# Patient Record
Sex: FEMALE | Race: WHITE
[De-identification: names, ages, dates, MRNs, and addresses within clinical notes are randomized per-mention and may not be internally consistent; named-entity substitution may affect disease eponyms.]

---

## 2018-04-17 ENCOUNTER — HOSPITAL ENCOUNTER (INPATIENT)
Dept: HOSPITAL 68 - ERH | Age: 63
LOS: 45 days | DRG: 885 | End: 2018-06-01
Attending: PSYCHIATRY & NEUROLOGY | Admitting: PSYCHIATRY & NEUROLOGY
Payer: COMMERCIAL

## 2018-04-17 VITALS — HEIGHT: 64 IN | WEIGHT: 150 LBS | BODY MASS INDEX: 25.61 KG/M2

## 2018-04-17 DIAGNOSIS — F25.9: Primary | ICD-10-CM

## 2018-04-17 LAB
ABSOLUTE GRANULOCYTE CT: 6 /CUMM (ref 1.4–6.5)
BASOPHILS # BLD: 0 /CUMM (ref 0–0.2)
BASOPHILS NFR BLD: 0.4 % (ref 0–2)
EOSINOPHIL # BLD: 0 /CUMM (ref 0–0.7)
EOSINOPHIL NFR BLD: 0.2 % (ref 0–5)
ERYTHROCYTE [DISTWIDTH] IN BLOOD BY AUTOMATED COUNT: 15.1 % (ref 11.5–14.5)
GRANULOCYTES NFR BLD: 74.2 % (ref 42.2–75.2)
HCT VFR BLD CALC: 41.3 % (ref 37–47)
LYMPHOCYTES # BLD: 1.3 /CUMM (ref 1.2–3.4)
MCH RBC QN AUTO: 31.1 PG (ref 27–31)
MCHC RBC AUTO-ENTMCNC: 33.5 G/DL (ref 33–37)
MCV RBC AUTO: 92.8 FL (ref 81–99)
MONOCYTES # BLD: 0.7 /CUMM (ref 0.1–0.6)
PLATELET # BLD: 171 /CUMM (ref 130–400)
PMV BLD AUTO: 7.9 FL (ref 7.4–10.4)
RED BLOOD CELL CT: 4.45 /CUMM (ref 4.2–5.4)
WBC # BLD AUTO: 8.1 /CUMM (ref 4.8–10.8)

## 2018-04-17 PROCEDURE — G0480 DRUG TEST DEF 1-7 CLASSES: HCPCS

## 2018-04-17 PROCEDURE — G0463 HOSPITAL OUTPT CLINIC VISIT: HCPCS

## 2018-04-17 NOTE — ED PSYCHIATRIC COMPLAINT
**See Addendum**
History of Present Illness
 
General
Chief Complaint: Psychiatric Related Complaint
Stated Complaint: +SI STATEMENTS
Source: patient
Exam Limitations: PSYCHIATRIC DISORDER
 
Vital Signs & Intake/Output
Vital Signs & Intake/Output
 Vital Signs
 
 
Date Time Temp Pulse Resp B/P B/P Pulse O2 O2 Flow FiO2
 
     Mean Ox Delivery Rate 
 
04/17 1130 98.7 73 18 142/70  95 Room Air  
 
 
 
Allergies
Coded Allergies:
olanzapine (Severe, PT REPORTS IT MADE HER LAME LED TO HOSPITALAZATION 03/14/16)
 
Reconcile Medications
Aspirin (Ecotrin*) 81 MG TABLET.DR   1 TAB PO DAILY HEART/BLOOD  (Reported)
Atorvastatin Calcium (Lipitor) 40 MG TABLET   1 TAB PO QPM CHOLESTEROL  (
Reported)
Benztropine Mesylate 0.5 MG TABLET   1 TAB PO QAM AKATHISIA  (Reported)
Benztropine Mesylate 0.5 MG TABLET   2 TAB PO QPM AKATHISIA  (Reported)
Carbamazepine (Tegretol XR) 200 MG TAB.ER.12H   1 TAB PO BID MENTAL HEALTH  (
Reported)
Levothyroxine Sodium (Synthroid) 137 MCG TABLET   1 TAB PO DAILY THYROID  (
Reported)
Metformin HCl 1,000 MG TABLET   1 TAB PO BID DIABETES  (Reported)
Propranolol HCl 10 MG TABLET   1 TAB PO BID BP  (Reported)
Risperidone 2 MG TABLET   1 TAB PO BID MENTAL HEALTH  (Reported)
Trazodone HCl 50 MG TABLET   1 TAB PO QHS PRN MENTAL HEALTH/SLEEP  (Reported)
 
Triage Note:
PT BIBA FROM HOME FOR +SI STATEMENTS. PT ON PEER,
 COPY IN CHART OTHER IN BOX. PT STATED TO FAMILY
 THAT SHE WANTS TO DIE. PT IS CURRENTLY HOMELESS
 LIVING WITH HER  OUT OF HER CAR.  IS
 CURRENTLY A PT HERE IN TRIAGE
Triage Nurses Notes Reviewed? yes
HPI:
Patient presents for evaluation of suicide ideation.  Patient stated "I want to 
die" because "I am bad" and "I'm disgusting".  Sometimes began over the past few
days and are described as more or less constant and severe.  When asked if she 
had a plan to commit suicide she states that she would have her ex- beat 
her up.
(Kati VALVERDE,Ben MACHADO)
 
Past History
 
Travel History
Traveled to [place] past 21 day No
 
Medical History
Any Pertinent Medical History? see below for history
Neurological: NONE (3 older sibs have Parkinson's), peripheral neuropathy
EENT: NONE
Cardiovascular: hypertension, hyperlipidemia
Respiratory: NONE
Gastrointestinal: NONE
Hepatic: NONE
Renal: NONE
Musculoskeletal: NONE
Psychiatric: schizo affective disorder
Endocrine: Hypothyroidism 
Blood Disorders: NONE
Cancer(s): NONE
GYN/Reproductive: NONE
History of MRSA: No
History of VRE: No
History of CDIFF: No
 
Surgical History
Surgical History: non-contributory
 
Psychosocial History
Who do you live with Other (see notes)
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
What is your primary language English
Tobacco Use: Never used
ETOH Use: denies use
Illicit Drug Use: denies illicit drug use
 
Family History
Hx Contributory? No
(Ben Parikh MD)
 
Review of Systems
 
Review of Systems
Constitutional:
Reports: no symptoms. 
EENTM:
Reports: no symptoms. 
Respiratory:
Reports: no symptoms. 
Cardiovascular:
Reports: no symptoms. 
GI:
Reports: no symptoms. 
Genitourinary:
Reports: no symptoms. 
Musculoskeletal:
Reports: no symptoms. 
Skin:
Reports: no symptoms. 
Neurological/Psychological:
Reports: see HPI. 
Hematologic/Endocrine:
Reports: no symptoms. 
Immunologic/Allergic:
Reports: no symptoms. 
All Other Systems: Reviewed and Negative
(Kati VALVERDE,Ben MACHADO)
 
Physical Exam
 
Physical Exam
General Appearance: SEE BELOW
Neurological/Psychiatric: SEE BELOW
Comments:
General: Alert, calm, cooperative
Head: Normocephalic, atraumatic
Eyes: Normal inspection, no nystagmus, EOMI
Ears: Normal inspection
Nose: Normal inspection
Throat: Moist mucosa
Neck: Supple, no goiter
Heart: Regular rate and rhythm, no murmurs rubs or gallops
Lungs: Clear to auscultation bilaterally with good air entry
Abdomen: Soft nontender nondistended, normal bowel sounds
Chest: Nontender
Extremities: Normal range of motion grossly, mild tremors present, no cyanosis 
clubbing or edema of the upper extremities
Neurologic: cranial nerves II through XII grossly intact, speech clear, gait 
normal
Psychiatric: No apparent delusions or hallucinations, no pressured speech or 
thought blocking
SAD PERSONS Done? DEFERRED TO CRISIS
(Ben Parikh MD)
 
Progress
Differential Diagnosis: DEPRESSION, ANXIETY, BIPOLAR DISORDER, PERSONALITY 
DISORDER
Plan of Care:
 Orders
 
 
Procedure Date/time Status
 
Continuous Observation Monitor 04/17 1052 Active
 
URINE DRUG SCREEN FOR ER ONLY 04/17 1052 Active
 
ETHANOL 04/17 1052 Active
 
CBC WITHOUT DIFFERENTIAL 04/17 1052 Complete
 
BASIC METABOLIC PANEL 04/17 1052 Active
 
ED CRISIS PSYCH CONSULT 04/17 1052 Active
 
 
 Laboratory Tests
 
 
04/17/18 1120:
Sodium Pending, Potassium Pending, Chloride Pending, Carbon Dioxide Pending, 
Anion Gap Pending, BUN Pending, Creatinine Pending, BUN/Creatinine Ratio Pending
, Glucose Pending, Calcium Pending, CBC w Diff NO MAN DIFF REQ, RBC 4.45, MCV 
92.8, MCH 31.1  H, MCHC 33.5, RDW 15.1  H, MPV 7.9, Gran % 74.2, Lymphocytes % 
16.3  L, Monocytes % 8.9, Eosinophils % 0.2, Basophils % 0.4, Absolute 
Granulocytes 6.0, Absolute Lymphocytes 1.3, Absolute Monocytes 0.7  H, Absolute 
Eosinophils 0, Absolute Basophils 0, Serum Alcohol Pending
 
Comments:
4/17/2018 11:27:26 AM  Patient signed out to Dr. Rey.
(Kati VALVERDE,Ben MACHADO)
 
Departure
 
Departure
Disposition: STILL A PATIENT
Condition: Stable
Clinical Impression
Primary Impression: Major depression
Referrals:
Gelacio Bowens MD (PCP/Family)
 
Departure Forms:
Customer Survey
General Discharge Information
(Kati VALVERDE,Ben MACHADO)
 
Departure
Comments
 
 
 
 
4/17/18
The patient was signed out to me by Dr. Parikh at 11 AM.  She is pending 
evaluation by crisis.
(Ben Rey DO)

## 2018-04-17 NOTE — ED PSYCH CRISIS CONSULTATION
Crisis Consult
 
Basic Assessment
Date of Consult: 18
Responsible Person/Accompanied By: self
Insurance Authorization:
Insurance #1:
 
Insurance name: MEDICARE A
Phone number: 
Policy number: [id number]
Group number: 
Authorization number: 
 
 
ED Provider:
Patient's ED Provider: Ben Rey DO
 
Primary Care Physician:
Patient's PCP: Gelacio Bowens MD
PCP's Phone Number: [phone]
 
Current Psychiatrist: Yunier Beard MD
Chief Complaint: Psychiatric Related Complaint
Patient's Quote: "  I had to die"
Present Illness:
Patient is a 63 year old female BIBA on a PEER for suicide ideation after her 
daughter and niece called 911. Patient is homeless and was sleeping in her car 
at her daughter home in the driveway. The niece states the patient has been 
making suicidal comments stating I want to die. The niece reports the patient 
has been sleeping at pay by [employer] hotels for a few months and most recently 
living in her car. This clinician met with patient who presents as delusional, 
disorganized, and depressed. The patient reports my  Jarocho is my doctor
and reports previous psychiatric admission not loving my . The patient 
reports hearing voices in her head for a few weeks stating I had to die. " I 
will have my  kill me".  The patient has a long history of psychiatric 
hospitalization at [facility] dating back to . The patient was a poor 
historian and unable to provide a treatment history.  The patient provided a 
negative toxicology screen and denied any substance abuse treatment and history.
 
Patient's Address:
[address] [place] [address]
Home Phone Number: [phone]
Other Phone Number: 
 
Who Do You Live With? Other (see notes) (homeless living a car)
Family/Informants Interviewed: Niece Beth Behrendt [phone]
Allergies -
Coded Allergies:
olanzapine (Severe, PT REPORTS IT MADE HER LAME LED TO HOSPITALAZATION 16)
 
Current Medications -
Scheduled Medications
Aspirin (Ecotrin*) 81 MG TABLET.   1 TAB PO DAILY HEART/BLOOD  (Reported)
     Entered as Reported by UMAIR SNYDER MD on 14 1128
Atorvastatin Calcium (Lipitor) 40 MG TABLET   1 TAB PO QPM CHOLESTEROL  (
Reported)
     Entered as Reported by Gabi Campos on 16
Benztropine Mesylate 0.5 MG TABLET   1 TAB PO QAM AKATHISIA #90  (Reported)
     Entered as Reported by Gabi Campos on 16
Benztropine Mesylate 0.5 MG TABLET   2 TAB PO QPM AKATHISIA  (Reported)
     Entered as Reported by Gabi Campos on 16
Carbamazepine (Tegretol XR) 200 MG TAB.ER.12H   1 TAB PO BID MENTAL HEALTH #60  
(Reported)
     Entered as Reported by Gabi Campos on 16
Levothyroxine Sodium (Synthroid) 137 MCG TABLET   1 TAB PO DAILY THYROID  (
Reported)
     Entered as Reported by Gabi Campos on 16
Metformin HCl 1,000 MG TABLET   1 TAB PO BID DIABETES #60  (Reported)
     Entered as Reported by Ameena Arthur on 16 170
Propranolol HCl 10 MG TABLET   1 TAB PO BID BP  (Reported)
     Entered as Reported by Gabi Campos on 16
Risperidone 2 MG TABLET   1 TAB PO BID MENTAL HEALTH #60  (Reported)
     Entered as Reported by Gabi Campos on 16
 
Scheduled PRN Medications
Trazodone HCl 50 MG TABLET   1 TAB PO QHS PRN MENTAL HEALTH/SLEEP  (Reported)
     Entered as Reported by Gabi Campos on 16
 
Laboratory Results:
 Laboratory Tests
 
18 1830:
Urine Opiates Screen < 100, Methadone Screen < 40, Barbiturate Screen < 60, Ur 
Phencyclidine Scrn < 6.00, Amphetamines Screen < 100, U Benzodiazepines Scrn < 
85, Urine Cocaine Screen < 50, Urine Cannabis Screen < 5.00
 
18 1643:
Lipase Cancelled
 
18 1120:
Anion Gap 17  H, Estimated GFR > 60, BUN/Creatinine Ratio 36.7  H, Glucose 110  
H, Calcium 9.5, CBC w Diff NO MAN DIFF REQ, RBC 4.45, MCV 92.8, MCH 31.1  H, 
MCHC 33.5, RDW 15.1  H, MPV 7.9, Gran % 74.2, Lymphocytes % 16.3  L, Monocytes %
8.9, Eosinophils % 0.2, Basophils % 0.4, Absolute Granulocytes 6.0, Absolute 
Lymphocytes 1.3, Absolute Monocytes 0.7  H, Absolute Eosinophils 0, Absolute 
Basophils 0, Serum Alcohol < 10.0
 
 
Past History
 
Past Medical History
Neurological: NONE (3 older sibs have Parkinson's), peripheral neuropathy
EENT: NONE
Cardiovascular: hypertension, hyperlipidemia
Respiratory: NONE
Gastrointestinal: NONE
Hepatic: NONE
Renal: NONE
Musculoskeletal: NONE
Psychiatric: depression, schizo affective disorder
Endocrine: Hypothyroidism 
Blood Disorders: NONE
Cancer(s): NONE
GYN/Reproductive: NONE
 
Past Surgical History
Surgical History: non-contributory
 
Psychosocial History
Strengths/Capabilities:
Supportive family
Physical Limitations (Interventions):
none
 
Psychiatric Treatment History
Psych Treatment
   Psychiatric Treatment Yes
   Inpatient Treatment Yes
   Outpatient Treatment Yes
   Location of Treatment [address]
   Reason for Treatment
Schizoaffective disorder
   Dates of Treatment 2016
   Response to Treatment
poor
Diagnosis by History:
Schizoaffective Disorder bipolar type
 
Substance Use/Abuse History
Drug Use/Abuse
   Substances Used/Abused No
   First Use none
   Last Used none
   How much used/taken none
   How often none
   For how long none
   Route of use none
 
Substance Abuse Treatment
Substance Abuse Treatment
   Past Substance Abuse [place] No
   Inpatient Treatment No
   Outpatient Treatment No
   Location of Treatment none
   Reason for Treatment
none
   Dates of Treatment none
 
Current Mental Status
 
Mental Status
Orientation: Person, Place
Affect: Depressed, Hopeless, Sad
Speech: Soft
Neuro-vegetative: Appetite Decreased
 
Appearance
Appearance- Dress/Hygiene:
Dressed in hospital clothing
 
Behaviors
Thought Process: Disorganized
Thought Content: Auditory Hallucinations
Memory: Short term memory
Insight: Poor
 
SI/HI Risk Assessment
Past Suicidal Ideation/Attempts Yes
Current Suicidal Ideation/Att Yes
Past Homicidal Ideation/Att: No
Current Homicidal Ideation/Attempts No
Degree of Intent: Plan, States Intent
Danger To: Self
Gravely Disabled: Inability, Lack of Insight, Poor Impulse Control, Poor 
Judgment
Risk Factors: age (under 24/over 65), SA/MH hospitalized
Lethality Ratin
 
PTSD Checklist
PTSD Score:
 
 
PTSD Score: Response Value
 
Disturbing memories,thoughts,images of stressful experience? Not at all 1
 
Disturbing dreams of stressful experience from past? Not at all 1
 
Suddenly acting/feeling as if reliving stressful experience? Not at all 1
 
Total   3
 
 
PTSD Done? pt unable to participate
 
ED Management
Sitter: Yes
Restraints: No
 
DSM5/PS Stressors/Medical Prob
Diagnosis' (DSM 5, Stressors, Medical):
Schizoaffective Bipoarl Type F25.9
 
Current GAF: 23
Comments:
Patient presents to the emergency room BIBA on a PEER after family called 911 
for suicide ideation. Patient reports hearing voices " i had to die", delusional
, disorganized and dpressed. Consulted with dr. ALISON Beard for the patient to be 
admitted inpatient for psychiatrist treatment.
 
Departure
 
Disposition
Psych Medical Clearance
   Date: 18
   Medically Cleared at: 
   Time Started: 
   Time Ended: 
   Psychiatrist Consulted: Yunier Beadr MD
Date Disposition Established: 18
Time Disposition Established: 
Plan for Disposition -
   Modality: Inpatient Psychiatry
   Facility: [facility]
Rationale for Disposition:
Patient presents to the emergency room BIBA on a PEER after family called 911 
for suicide ideation. Patient reports hearing voices " i had to die", delusional
, disorganized and dpressed. Consulted with dr. ALISON Beard for the patient to be 
admitted inpatient for psychiatrist treatment.
Type of IP Admission: PEC
Referrals
Gelacio Bowens MD (PCP/Family)

## 2018-04-17 NOTE — IP CRISIS DIAG ASSESS PSYCH
**See Addendum**
Diagnostic Assessment
 
Basic Assessment
Insurance Authorization:
Insurance #1:
 
Insurance name: MEDICARE A
Phone number: 
Policy number: [id number]
Group number: 
Authorization number: 
 
 
Primary Care Physician:
Patient's PCP: Gelacio Bowens MD
PCP's Phone Number: [phone]
 
Patient's Quote: "  I had to die"
Present Illness:
Patient is a 63 year old female BIBA on a PEER for suicide ideation after her 
daughter and niece called 911. Patient is homeless and was sleeping in her car 
at her daughter home in the driveway. The niece states the patient has been 
making suicidal comments stating I want to die. The niece reports the patient 
has been sleeping at pay by nights hotels for a few months and most recently 
living in her car. This clinician met with patient who presents as delusional, 
disorganized, and depressed. The patient reports my  Jarocho is my doctor
and reports previous psychiatric admission not loving my . The patient 
reports hearing voices in her head for a few weeks stating I had to die. " I 
will have my  kill me".  The patient has a long history of psychiatric 
hospitalization at [facility] dating back to . The patient was a poor 
historian and unable to provide a treatment history.  The patient provided a 
negative toxicology screen and denied any substance abuse treatment and history.
 
Patient's Address:
[address] [place] [address]
Home Phone Number: [phone]
Other Phone Number: 
 
Who Do You Live With? Other (see notes) (homeless living a car)
Feel Safe Where You Live? Yes
Feel Safe in Your Relationship Yes
Marital Status: 
Do You Have Children? Yes
Ages? 38
Primary Language? English
Language(s) Spoken At Home: English
Family/Informants Interviewed: Niece Beth Behrendt [phone]
Allergies -
Coded Allergies:
olanzapine (Severe, PT REPORTS IT MADE HER LAME LED TO HOSPITALAZATION 16)
 
Current Medications -
Scheduled Medications
Aspirin (Ecotrin*) 81 MG TABLET.   1 TAB PO DAILY HEART/BLOOD  (Reported)
     Entered as Reported by CLAUDIO VALVERDE,UMAIR on 14 1128
Atorvastatin Calcium (Lipitor) 40 MG TABLET   1 TAB PO QPM CHOLESTEROL  (
Reported)
     Entered as Reported by Gabi Campos on 16
Benztropine Mesylate 0.5 MG TABLET   1 TAB PO QAM AKATHISIA #90  (Reported)
     Entered as Reported by Gabi Campos on 16
Benztropine Mesylate 0.5 MG TABLET   2 TAB PO QPM AKATHISIA  (Reported)
     Entered as Reported by Gabi Campos on 16
Carbamazepine (Tegretol XR) 200 MG TAB.ER.12H   1 TAB PO BID MENTAL HEALTH #60  
(Reported)
     Entered as Reported by Gabi Campos on 16
Levothyroxine Sodium (Synthroid) 137 MCG TABLET   1 TAB PO DAILY THYROID  (
Reported)
     Entered as Reported by Gabi Campos on 16
Metformin HCl 1,000 MG TABLET   1 TAB PO BID DIABETES #60  (Reported)
     Entered as Reported by Ameena Arthur on 16 170
Propranolol HCl 10 MG TABLET   1 TAB PO BID BP  (Reported)
     Entered as Reported by Gabi Campos on 16
Risperidone 2 MG TABLET   1 TAB PO BID MENTAL HEALTH #60  (Reported)
     Entered as Reported by Gabi Campos on 16
 
Scheduled PRN Medications
Trazodone HCl 50 MG TABLET   1 TAB PO QHS PRN MENTAL HEALTH/SLEEP  (Reported)
     Entered as Reported by Gabi Campos on 16
 
Lab Results:
 Laboratory Tests
 
18 1830:
Urine Opiates Screen < 100, Methadone Screen < 40, Barbiturate Screen < 60, Ur 
Phencyclidine Scrn < 6.00, Amphetamines Screen < 100, U Benzodiazepines Scrn < 
85, Urine Cocaine Screen < 50, Urine Cannabis Screen < 5.00
 
18 1643:
Lipase Cancelled
 
18 1120:
Anion Gap 17  H, Estimated GFR > 60, BUN/Creatinine Ratio 36.7  H, Glucose 110  
H, Hemoglobin A1c Pending, Calcium 9.5, Triglycerides Pending, Cholesterol 
Pending, LDL Cholesterol, Calc Pending, HDL Cholesterol Pending, Cholesterol/HDL
Ratio Pending, TSH &T3 &Free T4 Intrp Pending, CBC w Diff NO MAN DIFF REQ, RBC 
4.45, MCV 92.8, MCH 31.1  H, MCHC 33.5, RDW 15.1  H, MPV 7.9, Gran % 74.2, 
Lymphocytes % 16.3  L, Monocytes % 8.9, Eosinophils % 0.2, Basophils % 0.4, 
Absolute Granulocytes 6.0, Absolute Lymphocytes 1.3, Absolute Monocytes 0.7  H, 
Absolute Eosinophils 0, Absolute Basophils 0, Valproic Acid Pending, Serum 
Alcohol < 10.0
 
Toxicology Screen Completed? Yes
Results: negative
 
Past History
 
Past Medical History
Medical History: Diabetes
 
Past Surgical History
Surgical History non-contributory
 
Abuse/Trauma History
Trauma History/Current Trauma: Denies
 
Legal History
Current Legal Status: none
Have you ever been arrested? Yes
Number of Arrests: 1
Pending Court Dates:
none reported
 none
 
Psychosocial History
Strengths/Capabilities:
Supportive family
Physical Limitations (Interventions):
none
 
Psychiatric Treatment History
Psych Treatment
   Psychiatric Treatment Yes
   Inpatient Treatment Yes
   Outpatient Treatment Yes
   Location of Treatment [address]
   Reason for Treatment
Schizoaffective disorder
   Dates of Treatment 2016
   Response to Treatment
poor
Diagnosis by History:
Schizoaffective Disorder bipolar type
Risk Factors: age (under 24/over 65), SA/ hospitalized
 
Substance Use/Abuse History
Drug Use/Abuse minimum 12mo Hx
   Substances Used/Abused No
   First Use none
   Last Used none
   How much used/taken none
   How often none
   For how long none
   Route of use none
 
Substance Abuse Treatment
Substance Abuse Treatment
   Past Substance Abuse [place] No
   Inpatient Treatment No
   Outpatient Treatment No
   Location of Treatment none
   Reason for Treatment
none
   Dates of Treatment none
 
Sexual History
Sexually Active Yes
# of partners 1
Sexual Orientation Heterosexual
Use of Protection No
Sexual Concerns:
None
 
Education History
Highest Level of Education: some college
Preferred Learning Style: auditory
 
Current Mental Status
 
Mental Status
Orientation: Person, Place
Affect: Depressed, Hopeless, Sad
Speech: Soft
Neuro-vegetative: Appetite Decreased
 
Appearance
Appearance- Dress/Hygiene:
Dressed in hospital clothing
 
Behaviors
Thought Process: Disorganized
Thought Content: Auditory Hallucinations
Memory: Short term memory
Insight: Poor
 
SI/HI Risk Assessment
- Minimum 6mo History-
Past Suicidal Ideation/Attempts Yes
Current Suicidal Ideation/Att Yes
Past Homicidal Ideation/Att: No
Current Homicidal Ideation/Attempts No
Degree of Intent: Plan, States Intent
Danger To: Self
Gravely Disabled: Inability, Lack of Insight, Poor Impulse Control, Poor 
Judgment
Risk Factors: age (under 24/over 65), SA/ hospitalized
Lethality Ratin
Needs/Init TX Plan/Goals:
medication evaluation and stablization , individual and group counseling, family
education and family meeting, discharge planning.
AUDIT-C Questionnaire:
 
 
AUDIT-C Questionnaire: Response Value
 
ETOH use in the past year Never 0
 
# drinks typical/day Doesn't Drink 0
 
6 or > drinks per occasion Never 0
 
Total   0
 
 
 
DSM5/PS Stressors/Medical Prob
Diagnosis' (DSM 5, Stressors, Medical):
Schizoaffective Bipoarl Type F25.9
Current GAF: 23
Comments:
Patient presents to the emergency room BIBA on a
PEER after family called 911 for suicide ideation.
Patient reports hearing voices " i had to die",
delusional, disorganized and dpressed. Consulted
with dr. ALISON Beard for the patient to be admitted
inpatient for psychiatrist treatment.

## 2018-04-17 NOTE — ED PSY CRISIS COLLATERAL NOTE
Collateral Note
 
Collateral Note
Family/Inform/Dangelo Contacts:
 The niece Beth Behrendt [phone] states the patient has been making 
suicidal comments stating I want to die. The niece reports having a 
conversation with her daughter Jennifer to call 911 to address her thoughts of 
suicide. She reports the patient being depressed and homeless and was living in 
a pay by night hotel.

## 2018-04-18 VITALS — DIASTOLIC BLOOD PRESSURE: 72 MMHG | SYSTOLIC BLOOD PRESSURE: 135 MMHG

## 2018-04-18 VITALS — DIASTOLIC BLOOD PRESSURE: 78 MMHG | SYSTOLIC BLOOD PRESSURE: 121 MMHG

## 2018-04-18 VITALS — SYSTOLIC BLOOD PRESSURE: 118 MMHG | DIASTOLIC BLOOD PRESSURE: 62 MMHG

## 2018-04-18 NOTE — HISTORY & PHYSICAL
General Information and HPI
MD Statement:
I have seen and personally examined PEYMAN REYES and documented this H&P.
 
The patient is a 63 year old F who presented with a patient stated chief 
complaint of suicidal statements.  "I want to die" because "I'm a bad and 
disgusting".
 
Source of Information: patient, family, old records
Exam Limitations: unable to give history
History of Present Illness:
63-year-old white female brought in by ambulance under PERR for suicidal 
ideations after the daughter knees called 911 patient is homeless was sleeping 
in her car at the daughter's home in the driveway she told her knees "I want to 
die patient is delusional disorganized and depressed and hearing voices for all 
those reasons is admitted for evaluation and treatment
 
Allergies/Medications
Allergies:
Coded Allergies:
olanzapine (Severe, PT REPORTS IT MADE HER LAME LED TO H[facility]PITALAZ[facility] 03/14/16)
 
Home Med list
Aspirin (Ecotrin*) 81 MG TABLET.DR   1 TAB PO DAILY HEART/BLOOD  (Reported)
Atorvastatin Calcium (Lipitor) 40 MG TABLET   1 TAB PO QPM CHOLESTEROL  (
Reported)
Benztropine Mesylate 0.5 MG TABLET   1 TAB PO QAM AKATHISIA  (Reported)
Benztropine Mesylate 0.5 MG TABLET   2 TAB PO QPM AKATHISIA  (Reported)
Carbamazepine (Tegretol XR) 200 MG TAB.ER.12H   1 TAB PO BID MENTAL HEALTH  (
Reported)
Levothyroxine Sodium (Synthroid) 137 MCG TABLET   1 TAB PO DAILY THYROID  (
Reported)
Metformin HCl 1,000 MG TABLET   1 TAB PO BID DIABETES  (Reported)
Propranolol HCl 10 MG TABLET   1 TAB PO BID BP  (Reported)
Risperidone 2 MG TABLET   1 TAB PO BID MENTAL HEALTH  (Reported)
Trazodone HCl 50 MG TABLET   1 TAB PO QHS PRN MENTAL HEALTH/SLEEP  (Reported)
 
Compliance With Home Meds: UNKNOWN
 
Past History
 
Travel History
Traveled to [place] past 21 day No
 
Medical History
Neurological: NONE (3 older sibs have Parkinson's), peripheral neuropathy
EENT: NONE
Cardiovascular: hypertension, hyperlipidemia
Respiratory: NONE
Gastrointestinal: NONE
Hepatic: NONE
Renal: NONE
Musculoskeletal: NONE
Psychiatric: depression, schizo affective disorder
Endocrine: Hypothyroidism 
Blood Disorders: NONE
Cancer(s): NONE
GYN/Reproductive: NONE
History of MRSA: No
History of VRE: No
History of CDIFF: No
Isolation History: Standard
 
Surgical History
Surgical History: non-contributory
 
Past Family/Social History
 
Psychosocial History
Where do you live? Other
Services at Home: Nursing
ETOH Use: denies use
Illicit Drug Use: denies illicit drug use
 
Review of Systems
 
Review of Systems
Constitutional:
Reports: see HPI. 
 
Exam & Diagnostic Data
Last 24 Hrs of Vital Signs/I&O
 Vital Signs
 
 
Date Time Temp Pulse Resp B/P B/P Pulse O2 O2 Flow FiO2
 
     Mean Ox Delivery Rate 
 
04/18 1148  83  121/78     
 
04/17 2211 96.9 104 14 114/62  98 Room Air  
 
04/17 2022 98.0 78 16 134/71  95 Room Air  
 
04/17 1823 98.3 85 18 136/76  94 Room Air  
 
04/17 1609 98.5 77 16 136/74  97 Room Air  
 
04/17 1408 98.7 82 14 134/71  100 Room Air  
 
 
 Intake & Output
 
 
 04/18 1600 04/18 0800 04/18 0000
 
Intake Total   
 
Output Total   
 
Balance   
 
    
 
Patient  150 lb 
 
Weight   
 
 
 
 
Physical Exam
General Appearance Alert, laying in bed not talkative but when asked if she knew
who I was she said  yes
Skin No Rashes
HEENT PERRLA, EOMI
Neck Supple, No JVD, +2 Carotid Pulse wo Bruit, No LAD
Lymphatic Axillary nl, Cervical nl
Cardiovascular Regular Rate, No Murmurs
Lungs Clear to Auscultation
Abdomen Normal Bowel Sounds, Soft, No Tenderness
Neurological
   Exam Findings: not tested
   Cranial Nerves II through XII:
Intact
Extremities No Cyanosis, No Edema
Vascular Normal Pulses, Pulses Symmetrical
Last 24 Hrs of Labs/Adilson:
 Laboratory Tests
 
04/17/18 1830:
Urine Opiates Screen < 100, Methadone Screen < 40, Barbiturate Screen < 60, Ur 
Phencyclidine Scrn < 6.00, Amphetamines Screen < 100, U Benzodiazepines Scrn < 
85, Urine Cocaine Screen < 50, Urine Cannabis Screen < 5.00
 
04/17/18 1643:
Lipase Cancelled
 
 
Assessment/Plan
 
As Ranked By This Provider
Problem List:
 1. Major depression
 
 2. Schizoaffective disorder
 
 
Miscellaneous
 
Miscellaneous Documentation
Attending Case Discussed With:
Gharaibeh MD,Camacho
 
Primary Care Physician:
Gelacio Bowens MD
 
Patient sees these Specialists
Psychiatry
Level of Patient Care: [facility]
Consults Needed:
   Consulting Specialty: Psychiatry
   Consulting Physician:
Dr. Beard
   Reason for Consult: depression and suicidal statements

## 2018-04-18 NOTE — SOCIAL WORKER PROG NOTE PSYCH
Social Work Progress Note
Progress Note
Pt laying in bed, would not ambulate and shook her head in regards to wanting to
meet, she shook her head no, and would not sit up and refused a lengthy 
conversation. Pt is familiar to this writer, and would suggest she is responding
to voices and is having trouble managing right now. Staff informed me she has 
not eaten or gotten out of bed all day. Will continue to monitor.

## 2018-04-18 NOTE — CPS PROVIDER INIT ASMT PSYCH
Psychiatric Admission
Crisis Worker's Note Reviewed: Yes
Patient Seen and Examined: Yes
Identifying Information:
Patient is a 63 year old female BIBA on a PEER for suicide ideation after her 
daughter and niece called 911. 
Chief Complaint:
"I had to die"
Reaction to Hospitalization:
The patient was admitted on 15 day physician emergency certificate
 
History of Present Illness
Onset of Illness:
Patient is homeless and was sleeping in her car at her daughter home in the 
driveway. The niece states the patient has been making suicidal comments stating


met with patient who presents as delusional, disorganized, and depressed. The 
patient reports my  Jarocho is my doctor and reports previous psychiatric
admission not loving my . The patient reports hearing voices in her 
head for a few weeks stating I had to die. " I will have my  kill me". 
The patient has a long history of psychiatric hospitalization at [facility] dating back to 2010. The patient was a poor historian and unable to 
provide a treatment history.  The patient provided a negative toxicology screen 
and denied any substance abuse treatment and history. 
Circumstances Leading to Admission:
See above
Problem(s) Justifying Need for Admission:
See above
 
Past Psychiatric History
Past Diagnosis(es)- if any:
Schizoaffective disorder bipolar type
Past Precipitating Factors- if any:
Noncompliance with medications, unstable housing
- Include inpatient and outpatient treatment
Treatment History:
The patient has been inpatient at [facility] psychiatric unit almost a 
dozen times in the past few years
History of Suicide Attempts or Gestures
The [facility] electronic health record indicates that she and her 
previous admissions it was noted that he she has no history of prior suicide 
attempts
Substance Abuse History:
Alcohol last used in October 2014.  Patient denies use of tobacco, alcohol and 
drugs.
Allergies:
Coded Allergies:
olanzapine (Severe, PT REPORTS IT MADE HER LAME LED TO HOSPITALAZATION 03/14/16)
 
Home Med List:
Aspirin (Ecotrin*) 81 MG TABLET.   1 TAB PO DAILY HEART/BLOOD  (Reported)
     Entered as Reported by CLAUDIO VALVERDE,[facility] on 09/21/14 1128
Atorvastatin Calcium (Lipitor) 40 MG TABLET   1 TAB PO QPM CHOLESTEROL  (
Reported)
     Entered as Reported by Gabi Campos on 11/01/16 1958
Benztropine Mesylate 0.5 MG TABLET   1 TAB PO QAM AKATHISIA #90  (Reported)
     Entered as Reported by Gabi Campos on 11/01/16 1920
Benztropine Mesylate 0.5 MG TABLET   2 TAB PO QPM AKATHISIA  (Reported)
     Entered as Reported by Gabi Campos on 11/01/16 1921
Carbamazepine (Tegretol XR) 200 MG TAB.ER.12H   1 TAB PO BID MENTAL HEALTH #60  
(Reported)
     Entered as Reported by Gabi Campos on 11/01/16 2000
Levothyroxine Sodium (Synthroid) 137 MCG TABLET   1 TAB PO DAILY THYROID  (
Reported)
     Entered as Reported by Gabi Campos on 11/01/16 1959
Metformin HCl 1,000 MG TABLET   1 TAB PO BID DIABETES #60  (Reported)
     Entered as Reported by Ameena Arthur on 08/26/16 1706
Propranolol HCl 10 MG TABLET   1 TAB PO BID BP  (Reported)
     Entered as Reported by Gabi Campos on 11/01/16 1923
Risperidone 2 MG TABLET   1 TAB PO BID MENTAL HEALTH #60  (Reported)
     Entered as Reported by Gabi Campos on 11/01/16 1959
Trazodone HCl 50 MG TABLET   1 TAB PO QHS PRN
- Include any medical condition(s) that may
- impact the patient's recovery/remission
 
Past History
 
Medical History
Neurological: NONE (3 older sibs have Parkinson's), peripheral neuropathy
EENT: NONE
Cardiovascular: hypertension, hyperlipidemia
Respiratory: NONE
Gastrointestinal: NONE
Hepatic: NONE
Renal: NONE
Musculoskeletal: NONE
Psychiatric: depression, schizo affective disorder
Endocrine: Hypothyroidism 
Blood Disorders: NONE
Cancer(s): NONE
GYN/Reproductive: NONE
History of MRSA: No
History of VRE: No
History of CDIFF: No
Isolation History: Standard
 
Surgical History
Surgical History: non-contributory
 
Psychiatric Family/Social Hx
 
Family History
Psychiatric Illness:
The record indicated no psychiatric history in the family
Substance Use:
The record indicated no history of substance abuse in the family
Suicides:
Record indicated no suicides in the family
 
Social History
Living Situation:
Reportedly has been living in her car
Significant Relationships (family/friends):
Daughter and niece
Education:
Unknown
Vocation/Occupation:
Unemployed on disability
Legal:
No current legal entanglements
 
Healthly Behaviors Screening
 
Tobacco Screening
Tobacco Use from ED Docu: Never used
- If tobacco counseling indicated
- the following topics are required.
- #1 Recognizing dangerous situations.
- #2 Coping Skills.
- #3 Basic information about quitting.
Status of Tobacco Cessation Counseling: Not Applicable
Cessation Med Status Not Applicable
 
Alcohol Screening
- ETOH screen POS if BAL >=80 or Audit-C>= M4/F3
Audit-C Score from Diag Assess: 0
Blood Alcohol Level:
Laboratory Tests
 
 
 04/17 1120
 
Toxicology 
 
  Serum Alcohol (<10 MG/DL) < 10.0
 
 
 
Alcohol Use Screening Results: Neg per Audit C &/or BAL
- If ETOH counseling indicated
- the following topics are required.
- #1 Express concern about the patient's
- drinking at unhealthy levels, include informing
- of national norms for moderate drinking:
- men <= 14 drinks/week, max 4 drinks/occasion
- women <= 7 drinks/week, max 3 drinks/occasion
- #2 Providing feedback, including linking alcohol to
- negative physical effects (liver injury, hypertension)
- negative emotional effects (relationship problems and
- depression)
- negative occupational consequences (reduced work
- performance)
- #3 Advising the patient to abstain from alcohol or
- to drink below national norms for moderate drinking
- (as listed above).
Status of ETOH Use Counseling: N/A B/C NO ETOH Use
 
Metabolic Screening
- Screen if on a Neuroleptic Medication
- Metabolic screening should include:
- Blood Pressure, BMI, Glucose or Hgb A1c, & a
- Lipid profile from within the past 365 days.
Metabolic Screening
Patient on a neuroleptic(s) .
     Enter below results for Hemoglobin A1C, 
     and lipid panel if obtained during the last 365 days.
 
BMI: 25.700     
 
Blood Pressure: 118/62
 
Laboratory Results From [facility] (If applicable):
 
 Lab
 
 
Cholesterol 183 MG/DL 04/17/18 1120
 
Cholesterol/HDL Ratio 3 % 04/17/18 1120
 
HDL Cholesterol 59 mg/dL 04/17/18 1120
 
Hemoglobin A1c 6.0 % H 04/17/18 1120
 
LDL Cholesterol, Calc 83 mg/dL 04/17/18 1120
 
Triglycerides 209 mg/dL H 04/17/18 1120
 
 
 
Exam and Plan
 
Mental Status Examination
Ambulation Status:
The patient was lying in bed when I interviewed her
Appearance:
The patient had visible tardive dyskinesia of the oral facial muscles
Attitude towards examiner:
She was calm and cooperative
Psychomotor activity:
She has abnormal movements of the oral facial muscles
Behavior:
There was no bizarre behaviors during the interview
Quality of speech:
The speech was incoherent
Affect:
Patient showed constricted affect
Mood:
The patient was unable to answer whether she was depressed on
Suicidal Ideation:
Denied
Homicidal Ideation:
Denied
Hallucinations:
Unable to determine
Paranoid/Delusional Material:
She denied feeling paranoid, but there were delusions described in the crisis 
intervention evaluation
Difficulties with thought organization:
She was incoherent
Insight:
Poor insight
Judgment:
Poor judgment
Orientation:
Alert but was unable to answer the orientation questions
Cognition:
Impaired cognition including impaired attention concentration and information 
processing
Memory Function:
Unable to determine
Estimate of intellectual functioning:
Unable to determine
 
Assets/Strengths
Patient Identified Assets/Strengths:
The patient was calm, and friendly /likable
 
Impression/Plan
Impression and Plan:
A 63-year-old white female with history of schizoaffective disorder who 
presented with what seemed to be in an acute psychotic/manic state
- Include all active medical diagnosis that require tx
DSM 5 Diagnosis(es):
Schizoaffective disorder, bipolar type
- Initial Tx Plan for Active Psych & Medical Conditions
Treatment Plan:
Inpatient psychiatric care with safety checks every 15 minutes
Resume medications
Biopsychosocial assessment, collateral, and aftercare planning and
Nursing assessments, vital signs, and patient education and
Patient will be evaluated daily by a psychiatrist for mental status evaluation 
and medication monitoring
- Factors that would help patient function
- in a less restrictive setting.
Factors:
The patient will be discharged once his psychotic symptoms are under reasonable 
control

## 2018-04-18 NOTE — SOCIAL WORKER PROG NOTE PSYCH
Social Work Progress Note
Progress Note
Attempted to see patient to complete social history for her.    Patient was 
lying in bed, isolating. She stated that she did not wish talk--and couln't talk
-- at this time as she was too depressed.
Patient was curteous in her refusal.  Patient and myself have had a very 
positive therapeutic relationship for many years; but patient declined to speak.

## 2018-04-19 VITALS — SYSTOLIC BLOOD PRESSURE: 112 MMHG | DIASTOLIC BLOOD PRESSURE: 68 MMHG

## 2018-04-19 VITALS — SYSTOLIC BLOOD PRESSURE: 141 MMHG | DIASTOLIC BLOOD PRESSURE: 63 MMHG

## 2018-04-19 VITALS — SYSTOLIC BLOOD PRESSURE: 122 MMHG | DIASTOLIC BLOOD PRESSURE: 58 MMHG

## 2018-04-19 VITALS — SYSTOLIC BLOOD PRESSURE: 130 MMHG | DIASTOLIC BLOOD PRESSURE: 55 MMHG

## 2018-04-19 VITALS — SYSTOLIC BLOOD PRESSURE: 135 MMHG | DIASTOLIC BLOOD PRESSURE: 77 MMHG

## 2018-04-19 NOTE — CP SOUTH PROGRESS NOTE PSYCH
Psych (Inpt) Progress Note
Progress Note
The patient's progress, treatment, and aftercare planning were discussed and the
treatment team meeting this morning.  The treatment team included nursing staff,
social work staff, group and activity therapy staff, and psychiatrist.
Vital Signs
 
 
Date Time Temp Pulse B/P Pulse O2 O2 Flow FiO2
 
04/19 1227  96 122/58    
 
04/19 0833 98.0 92 141/63    
 
04/18 2000 96.8 96 135/72    
 
04/18 1731  83 118/62    
 
 
 
Mental Status Examination
The patient was up and about earlier in the day but she did not want to talk 
because she had to take a shower she said.  When I interviewer in the afternoon 
she was lying in bed. The patient was sleeping but easily arousable.  She had 
visible tardive dyskinesia of the oral facial muscles.  Regular earlier in the 
day when she was wide awake she had parkinsonian tremors.
She was calm and cooperative, pleasant
She has abnormal movements of the oral facial muscles (dyskinesia) as well as 
parkinsonian tremors
There was no bizarre behaviors during the interview
The speech was incoherent, the patient showed constricted affect
She denied feeling depressed, but her answers seemed to be unreliable and still 
somewhat disorganized
Denied suicidal ideation and denied homicidal Ideation, I did not understand 
from her answer whether she was having hallucinations or not.
She denied feeling paranoid, but there were delusions described in the crisis 
intervention evaluation
She was incoherent
Poor insight
Poor judgment
Alert but was unable to answer the orientation questions
Impaired cognition including impaired attention concentration and information 
processing
I was unable to test her memory Functions
 
Assessment:
A 63-year-old white female with history of schizoaffective disorder who 
presented with what seemed to be in an acute psychotic/manic state
DSM 5 Diagnosis(es):
Schizoaffective disorder, bipolar type
 
Treatment Plan:
Start Zyprexa 5 mg at bedtime
Biopsychosocial assessment, collateral, and aftercare planning and
Nursing assessments, vital signs, and patient education and
Patient will be evaluated daily by a psychiatrist for mental status evaluation 
and medication monitoring
 
 
Biopsychosocial assessment, collateral, and aftercare planning and
Nursing assessments, vital signs, and patient education and
Patient will be evaluated daily by a psychiatrist for mental status evaluation 
and medication monitoring

## 2018-04-19 NOTE — SOCIAL WORKER PROG NOTE PSYCH
Social Work Progress Note
Progress Note
Pt states "I'm a bad person, I ruined my families life", pt perseverating over 
negative thoughts and delusions, continues to isolate and lay in bed most of the
day, nursing moved her closer to their station for monitoring... Pt resuing to 
sit up and meet, expressed that she "is sick and will always be sick".  Reminded
pt that she is safe and will be taken care of. Pt not at baseline.

## 2018-04-20 VITALS — SYSTOLIC BLOOD PRESSURE: 125 MMHG | DIASTOLIC BLOOD PRESSURE: 76 MMHG

## 2018-04-20 VITALS — SYSTOLIC BLOOD PRESSURE: 123 MMHG | DIASTOLIC BLOOD PRESSURE: 72 MMHG

## 2018-04-20 VITALS — DIASTOLIC BLOOD PRESSURE: 72 MMHG | SYSTOLIC BLOOD PRESSURE: 123 MMHG

## 2018-04-20 VITALS — DIASTOLIC BLOOD PRESSURE: 73 MMHG | SYSTOLIC BLOOD PRESSURE: 110 MMHG

## 2018-04-20 NOTE — CP SOUTH PROGRESS NOTE PSYCH
Psych (Inpt) Progress Note
Progress Note
The patient's progress, treatment, and aftercare planning were discussed and the
treatment team meeting this morning.  The treatment team included nursing staff,
social work staff, group and activity therapy staff, and psychiatrist.
 
Vital Signs: Blood Pressure 110/73 mmHg, pulse 98 bpm, temperature 97.5F
 
Mental Status Examination
The patient was alert and oriented to time, place, and person. She had visible 
tardive dyskinesia of the orofacial muscles and Parkinsonian tremors of both 
upper extremities.
She was calm and cooperative, and pleasant. There was no bizarre behaviors 
during the interview
Today, Maren speech was coherent.  She did acknowledge that she has been 
hallucinating and continues to have delusions about her ex-, Pb.  Also 
keeps deferring that to herself as being worthless and ugly and disgusting and 
that she should be punished 
She showed more affective expression today including being appropriately tearful
during the interview
She she reported feeling worthless and depressed, she believes that she should 
be punished or deserves to be punished.  She denied that she has plans to 
actively end her own life.  She denied having violent thoughts or thoughts of 
homicide against ex- or anyone else 
She reported that the lies that she was suspecting of having was "taking care 
of."  No longer feels that she actively has lice in her hair.
Today she showed some insight into her illness.  She also showed better 
attention and concentration and information processing.  And did not seem to 
have short-term memory impairment.  
 
Assessment:
A 63-year-old  White female with history of schizoaffective disorder who
presented with what seemed to be in an acute /disorganized state.
She already has shown significant improvement and more coherent and organized.
Diagnoses:
Schizoaffective disorder, bipolar type
 
Treatment Plan:
Increase Zyprexa to 7.5 mg at bedtime
[facility] collateral[facility]
Nursing assessments, vital signs, and patient education By RN
Patient will be evaluated daily by a psychiatrist for mental status evaluation 
and medication monitoring
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
## 2018-04-20 NOTE — SOCIAL WORKER PROG NOTE PSYCH
**See Addendum**
Social Work Progress Note
Progress Note
Met briefly with Mesha - she was doing laundry. She was disheveled, hadno teeth.
 She was cooperative, appeared depressed.  She was asking if she saw me before 
and gave me $142 would I find her a place to live.  She stated "I should have 
never trusted Jarocho and gone back to him."  
 
Need to contact her sister, Meme and daughter for collateral.

## 2018-04-21 VITALS — DIASTOLIC BLOOD PRESSURE: 69 MMHG | SYSTOLIC BLOOD PRESSURE: 111 MMHG

## 2018-04-21 VITALS — DIASTOLIC BLOOD PRESSURE: 77 MMHG | SYSTOLIC BLOOD PRESSURE: 135 MMHG

## 2018-04-21 VITALS — DIASTOLIC BLOOD PRESSURE: 71 MMHG | SYSTOLIC BLOOD PRESSURE: 129 MMHG

## 2018-04-21 VITALS — DIASTOLIC BLOOD PRESSURE: 76 MMHG | SYSTOLIC BLOOD PRESSURE: 132 MMHG

## 2018-04-21 NOTE — CP SOUTH PROGRESS NOTE PSYCH
Psych (Inpt) Progress Note
Progress Note
Include the following elements, when applicable:
Involvement in the active treatment of the patient with behavioral observations 
of the patient and the patient's response to the treatment.
Review of the ongoing treatment process in the context of the treatment plan.
Indication of how multi-disciplinary staff members are carrying out the 
treatment plan.
Plans for future interventions and recommendations for revision of the treatment
plan.
Liaison with other physicians/providers.
Progress Note:
 
Chart reviewed. Progress discussed with nursing staff. Interviewed patient this 
morning. Patient reports some improvement in AH. "Why do I get these"? Denies 
med SEs, denies SI/HI. Endorses ongoing poor mood. 
 
Vitals and labs reviewed. Findings are: mild tachycardia.
 
Mental status exam: Elderly appearing CF without dentures in mouth. +TD. +UE 
tremor. Limited eye contact. Quiet and mumbled speech. Mood "bad". Affect 
dysphoric, odd. TP impoverished. TC without SI/HI. +AH. Cognition was grossly 
intact. I/J fair. 
 
Assessment:
A 63-year-old  White female with history of schizoaffective disorder who
presented with what seemed to be in an acute /disorganized state.
She continues to show significant improvement and is more coherent and 
organized.
Diagnoses:
Schizoaffective disorder, bipolar type
 
Treatment Plan:
Continue zyprexa 7.5 mg QHS and remainder of plan per primary team.

## 2018-04-22 VITALS — DIASTOLIC BLOOD PRESSURE: 88 MMHG | SYSTOLIC BLOOD PRESSURE: 141 MMHG

## 2018-04-22 VITALS — DIASTOLIC BLOOD PRESSURE: 79 MMHG | SYSTOLIC BLOOD PRESSURE: 132 MMHG

## 2018-04-22 VITALS — DIASTOLIC BLOOD PRESSURE: 66 MMHG | SYSTOLIC BLOOD PRESSURE: 113 MMHG
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
## 2018-04-23 VITALS — SYSTOLIC BLOOD PRESSURE: 115 MMHG | DIASTOLIC BLOOD PRESSURE: 71 MMHG

## 2018-04-23 VITALS — SYSTOLIC BLOOD PRESSURE: 111 MMHG | DIASTOLIC BLOOD PRESSURE: 66 MMHG

## 2018-04-23 VITALS — SYSTOLIC BLOOD PRESSURE: 139 MMHG | DIASTOLIC BLOOD PRESSURE: 81 MMHG

## 2018-04-23 VITALS — DIASTOLIC BLOOD PRESSURE: 71 MMHG | SYSTOLIC BLOOD PRESSURE: 107 MMHG

## 2018-04-23 NOTE — CP SOUTH PROGRESS NOTE PSYCH
Psych (Inpt) Progress Note
Progress Note
Vital Signs: Vital Signs
 
 
Date Time Temp Pulse Resp B/P
 
    
 
04/23 1232  80  107/71
 
04/23 0825 97.1 120  139/81
 
04/22 1952 96.9 112  132/79
 
04/22 1652  108  113/66
 
Dr. Coker notes for the weekend were reviewed. 
 
In the treatment team meeting this morning, patient's progress was reviewed, and
the treatment and aftercare plans were reviewed and updated. The treatment team 
included: RN, MELIDAW, group and activity therapy staff, and psychiatrist.
 
Mental Status Examination
The patient was alert and oriented to time, place, and person. 
"I feel mixed,"  "I see some rays of hope."
tardive dyskinesia of the orofacial muscles and Parkinsonian tremors of both 
upper extremities.
She was calm and cooperative, 
no bizarre behaviors during the interview
she was still disorganized, and expressed some delusions 
acknowledged hallucinations "One ear tells me the truth, and one ear tells me 
lies"
"madison is not done yet." stating she needs punishment and began telling a 
difficult to follow story 
My granddaughters came to visit yesterday". She reports vague SI and ongoing AH.
Denies med side effect. 
Limited eye contact. Quiet and mumbled speech. Affect dysphoric, o
Denies HI. 
Cognition was grossly intact. feeling worthless and depressed, she believes that
she should be punished or deserves to be punished.  She denied that she has 
plans to actively end her own life.  She denied having violent thoughts or 
thoughts of homicide against ex- or anyone else.  showed better attention
and concentration and information processing, did not seem to have short-term 
memory impairment.  
 
Assessment: 
A 63-year-old  White female with history of schizoaffective disorder who
presented with what seemed to be in an acute /disorganized state. She already 
has shown significant improvement and more coherent and organized. Continues to 
demonstrate dysphoria with accompanied by delusions and AH.
Diagnoses:
Schizoaffective disorder, bipolar type
 
Treatment Plan:
Increase Zyprexa to 10 mg at bedtime

## 2018-04-23 NOTE — SOCIAL WORKER PROG NOTE PSYCH
Social Work Progress Note
Progress Note
Making some progress, out of bed in the milieu, interacting more socially 
appropriately. Pt asks "do you think I'm going to get better", and she slightly 
smiled.  Pt reports she went to groups today.

## 2018-04-24 VITALS — DIASTOLIC BLOOD PRESSURE: 77 MMHG | SYSTOLIC BLOOD PRESSURE: 132 MMHG

## 2018-04-24 VITALS — DIASTOLIC BLOOD PRESSURE: 69 MMHG | SYSTOLIC BLOOD PRESSURE: 129 MMHG

## 2018-04-24 VITALS — DIASTOLIC BLOOD PRESSURE: 68 MMHG | SYSTOLIC BLOOD PRESSURE: 118 MMHG

## 2018-04-24 VITALS — DIASTOLIC BLOOD PRESSURE: 75 MMHG | SYSTOLIC BLOOD PRESSURE: 129 MMHG

## 2018-04-24 NOTE — CP SOUTH PROGRESS NOTE PSYCH
Psych (Inpt) Progress Note
Progress Note
 
In the treatment team meeting this morning, patient's progress was reviewed, and
the treatment and aftercare plans were reviewed and updated. The treatment team 
included: RN, SCOUT, group and activity therapy staff, and psychiatrist.
 
Vital Signs:
 
 
Date Time Temp Pulse B/P
 
04/24 0808 97.5 75 129/75
 
 
Mental Status Examination (14:30):
The patient was sleeping, difficult to arouse, 
 
as of yesterday, she was still disorganized and expressing delusions 
acknowledged hallucinations "One ear tells me the truth, and one ear tells me 
lies"
"madison is not done yet." stating she needs punishment and began telling a 
difficult to follow story 
My granddaughters came to visit yesterday". She reports vague SI and ongoing AH.
Denies med side effect. feeling worthless and depressed, she believes that she 
should be punished or deserves to be punished.  She denied that she has plans to
actively end her own life.  She denied having violent thoughts or thoughts of 
homicide against ex- or anyone else. 
 
Assessment: 
A 63-year-old  White female with history of schizoaffective disorder who
presented with what seemed to be in an acute /disorganized state. She already 
has shown significant improvement and more coherent and organized. Continues to 
demonstrate dysphoria with accompanied by delusions and AH.
 
Diagnoses:
Schizoaffective disorder, bipolar type
 
Treatment Plan:
Due to sedation, 1) will reduce gabapentin to 300 mg three times daily
2) Will reduce AM Depakote to 250 mg
3) will D/C PRN Ativan
Continue Zyprexa 10 mg at bedtime
 
 
Diagnoses:
Schizoaffective disorder, bipolar type
 
Treatment Plan:
Increase Zyprexa to 10 mg at bedtime
 
 
Schizoaffective disorder, bipolar type
 
Treatment Plan:
Increase Zyprexa to 10 mg at bedtime

## 2018-04-24 NOTE — SOCIAL WORKER PROG NOTE PSYCH
Social Work Progress Note
Progress Note
PEYMAN MATHEWSMaría REYES
[id number]
1955
PEYMAN MATHEWSMaría REYES 
[id number]
 
Pended Authorization #
Client Authorization #
Type of Request
 
[id number]
[id number]
CONCURRENT
 
 
Date of Admission/ Start of Services
Requested From
Submission Date
   
04/17/2018
04/24/2018
04/24/2018

## 2018-04-24 NOTE — CP SOUTH PROGRESS NOTE PSYCH
Psych (Inpt) Progress Note
Progress Note
Addendum to the previous note:
patient woke up around 2:45 but had some tachycardia and was feeling lethargic 
and depressed.
 
Plan:
se previous note for changes AND D/C Sinemet
I left a message to Dr. Rousseau to call me to discuss patient's medications

## 2018-04-25 VITALS — SYSTOLIC BLOOD PRESSURE: 132 MMHG | DIASTOLIC BLOOD PRESSURE: 70 MMHG

## 2018-04-25 VITALS — DIASTOLIC BLOOD PRESSURE: 83 MMHG | SYSTOLIC BLOOD PRESSURE: 138 MMHG

## 2018-04-25 VITALS — DIASTOLIC BLOOD PRESSURE: 75 MMHG | SYSTOLIC BLOOD PRESSURE: 120 MMHG

## 2018-04-25 VITALS — SYSTOLIC BLOOD PRESSURE: 126 MMHG | DIASTOLIC BLOOD PRESSURE: 69 MMHG
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
## 2018-04-26 VITALS — SYSTOLIC BLOOD PRESSURE: 133 MMHG | DIASTOLIC BLOOD PRESSURE: 73 MMHG

## 2018-04-26 VITALS — SYSTOLIC BLOOD PRESSURE: 136 MMHG | DIASTOLIC BLOOD PRESSURE: 72 MMHG

## 2018-04-26 VITALS — SYSTOLIC BLOOD PRESSURE: 142 MMHG | DIASTOLIC BLOOD PRESSURE: 87 MMHG

## 2018-04-26 VITALS — DIASTOLIC BLOOD PRESSURE: 79 MMHG | SYSTOLIC BLOOD PRESSURE: 135 MMHG

## 2018-04-26 NOTE — CP SOUTH PROGRESS NOTE PSYCH
Psych (Inpt) Progress Note
Progress Note
 
The patient's progress was reviewed, and the treatment and aftercare plans were 
reviewed and updated in the treatment team meeting which included: RN; SCOUT, 
Group Therapy + Activity therapy staff, and psychiatrist.
 
Vital Signs: Blood pressure:  142/87 mmHg, pulse: 98 bpm, temperature: 96.2F
 
Normal EKG, normal sinus rhythm, QTc 447 mSec
 
Mental Status:
The patient was alert and oriented to time, place, and person. although she was 
still voicing delusions, she was less disorganized, she has irrational feeling 
of guilt/saying she needs to be punished 
acknowledged hallucinations 
making statements about being worthless and "disgusting" and stating she needs 
punishment 
Surprisingly, the patient's tardive dyskinesia showed improvement, parkinsonian 
tremors have not worsened since that discontinuation of Sinemet. continues to 
feel depressed but she was not tearful during today's interview . Although she 
feels that she deserves punishment and deserves to die, she she asserts that she
"would never commit suicide."  She denied having violent thoughts or thoughts of
homicide against Pb or anyone else. 
 
Assessment: 
A 63-year-old  White female with history of schizoaffective disorder was
admitted to the inpatient psychiatric unit on 04/17/2018 with what seemed to be 
in an acute /disorganized state. She is more coherent and organized but 
continues to have auditory hallucinations and delusions. 
 
Diagnoses:
Schizoaffective disorder, bipolar type
 
Treatment Plan:
Starting tomorrow, 04/27/2018, reduce Depakote to 250 mg once daily at bedtime
Continue gabapentin 300 mg three times daily
Continue Zyprexa 10 mg at bedtime
Continue Lorazepam 1 mg at bedtime
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
## 2018-04-27 VITALS — SYSTOLIC BLOOD PRESSURE: 133 MMHG | DIASTOLIC BLOOD PRESSURE: 71 MMHG

## 2018-04-27 VITALS — DIASTOLIC BLOOD PRESSURE: 73 MMHG | SYSTOLIC BLOOD PRESSURE: 129 MMHG

## 2018-04-27 VITALS — SYSTOLIC BLOOD PRESSURE: 127 MMHG | DIASTOLIC BLOOD PRESSURE: 62 MMHG

## 2018-04-27 VITALS — SYSTOLIC BLOOD PRESSURE: 136 MMHG | DIASTOLIC BLOOD PRESSURE: 91 MMHG

## 2018-04-27 NOTE — CP SOUTH PROGRESS NOTE PSYCH
Psych (Inpt) Progress Note
Progress Note
 
The patient's progress was reviewed, and the treatment and aftercare plans were 
reviewed and updated in the treatment team meeting which included: RN; SCOUT, 
Group Therapy + Activity therapy staff, and psychiatrist.
 
Vital Signs: Blood pressure: 136/91 mmHg, pulse: 98 bpm, temperature: 97.4 F
Normal EKG, normal sinus rhythm, QTc 447 mSec
 
Mental Status:
The patient reported that she is still hearing voices (multiple) 
She continues to experience paranoid delusions and irrational feeling of guilt/
saying she needs to be punished 
alert and oriented to time, place, and person. 
 less disorganized, feels worthless and "disgusting" 
tardive dyskinesia & parkinsonian tremors slightly improved
 continues to feel depressed but she was not tearful during today's interview . 
she asserts that she "would never commit suicide."  She denied having violent 
thoughts or thoughts of homicide 
 
Assessment: 
A 63-year-old  White female with history of schizoaffective disorder was
admitted to the inpatient psychiatric unit on 04/17/2018 with what seemed to be 
in an acute psychosis /disorganized state. She continues to have auditory 
hallucinations and delusions but both are slowly improving
 
Diagnoses:
Schizoaffective disorder, bipolar type
 
Treatment Plan:
D/C Depakote 
REDUCE gabapentin to 200 mg three times daily
increase Zyprexa to 15 mg at bedtime
Continue Lorazepam 1 mg at bedtime
 
 
 
 
Continue Lorazepam 1 mg at bedtime

## 2018-04-27 NOTE — SOCIAL WORKER PROG NOTE PSYCH
Social Work Progress Note
Progress Note
I attempted to call her daughter Jennifer today.  I left a message. Mesha was in the
community and active in groups today.  I told her that I tried to call her 
daughter and was waiting to connect.  She continues to report Jennifer doesn't like 
her and how horrible she has been.  Asked her to identify something positive 
today?  She said "everyone here was nice to me today."  She talked about her 
daughter's nice qualities.  I told her she must have done something right to 
raise her.

## 2018-04-28 VITALS — DIASTOLIC BLOOD PRESSURE: 65 MMHG | SYSTOLIC BLOOD PRESSURE: 136 MMHG

## 2018-04-28 VITALS — DIASTOLIC BLOOD PRESSURE: 70 MMHG | SYSTOLIC BLOOD PRESSURE: 122 MMHG

## 2018-04-28 VITALS — SYSTOLIC BLOOD PRESSURE: 125 MMHG | DIASTOLIC BLOOD PRESSURE: 73 MMHG

## 2018-04-28 VITALS — SYSTOLIC BLOOD PRESSURE: 126 MMHG | DIASTOLIC BLOOD PRESSURE: 84 MMHG

## 2018-04-28 NOTE — CP SOUTH PROGRESS NOTE PSYCH
Psych (Inpt) Progress Note
Progress Note
Include the following elements, when applicable:
Involvement in the active treatment of the patient with behavioral observations 
of the patient and the patient's response to the treatment.
Review of the ongoing treatment process in the context of the treatment plan.
Indication of how multi-disciplinary staff members are carrying out the 
treatment plan.
Plans for future interventions and recommendations for revision of the treatment
plan.
Liaison with other physicians/providers.
Progress Note:
 
Isolative, watching tv and poorly interacting with peers. Pleasant and 
cooperative overall, despite low mood and depressed affect. Stated that she is 
not suicidal but "everyone else around me is, they want to hurt me, I did a lot 
of bad things in my life." Not actively hallucinating but likely has some voices
at times. She stated that she had no issues with sleep, enjoyed her lunch, and 
had no issues with her meds. 
 
MSE: pleasant middle aged woman, some thought blocking, impoverished content of 
thought, paranoia; TD. cooperative, mood is neutral to down, affect is 
constricted. Not actively suicidal but paranoid about unspecified others trying 
to do her harm, though not in the hospital. Insight is poor and judgment is 
adequate. 
 
A: 63 year old woman with a psychotic illness, with residual paranoia, 
impoverished thinking and evidence of movement disorder. She continues to need 
inpatient tx with antipsychotics in order to stabilize her mental status. 
Plan: continue current plan of care.

## 2018-04-29 VITALS — DIASTOLIC BLOOD PRESSURE: 73 MMHG | SYSTOLIC BLOOD PRESSURE: 130 MMHG

## 2018-04-29 VITALS — DIASTOLIC BLOOD PRESSURE: 66 MMHG | SYSTOLIC BLOOD PRESSURE: 127 MMHG

## 2018-04-29 VITALS — SYSTOLIC BLOOD PRESSURE: 116 MMHG | DIASTOLIC BLOOD PRESSURE: 68 MMHG

## 2018-04-29 VITALS — SYSTOLIC BLOOD PRESSURE: 120 MMHG | DIASTOLIC BLOOD PRESSURE: 50 MMHG
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
## 2018-04-30 VITALS — SYSTOLIC BLOOD PRESSURE: 132 MMHG | DIASTOLIC BLOOD PRESSURE: 73 MMHG

## 2018-04-30 VITALS — SYSTOLIC BLOOD PRESSURE: 123 MMHG | DIASTOLIC BLOOD PRESSURE: 67 MMHG

## 2018-04-30 VITALS — DIASTOLIC BLOOD PRESSURE: 64 MMHG | SYSTOLIC BLOOD PRESSURE: 118 MMHG

## 2018-04-30 VITALS — DIASTOLIC BLOOD PRESSURE: 53 MMHG | SYSTOLIC BLOOD PRESSURE: 100 MMHG
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
## 2018-04-30 NOTE — SOCIAL WORKER PROG NOTE PSYCH
Social Work Progress Note
Progress Note
 
 
 
The services requested require additional review. You will be contacted 
regarding the status of this request if further information is needed. An 
authorization decision will be made within the required timeframes and details 
of that decision may be found under the member's authorization history.
 
 
 
 
Member Name Member ID Member  Subscriber Name  Subscriber ID 
 
PEYMAN BEAULIEUY [id number] PEYMAN REYES  [id number]
 
    
 
Pended Authorization # Client Authorization # Type of Request  
 
[id number]  CONCURRENT   
 
    
 
Date of Admission/ Start of Services Requested From Submission Date  
 
2018   
 
    
 
Level of Service  Type of Service Level of Care  Type of Care  
 
INPATIENT/[facility] Mental Health Inpatient  [facility] 
 
 
    
 
Reason Code    
 
P77    
 
    
 
Provider Name & Address Provider ID Provider Alternate ID NPI # for 
Authorization 
 
HAZEL THIBODEAUX  [id number]  N/A  
 
[address] [id number]
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
## 2018-05-01 VITALS — DIASTOLIC BLOOD PRESSURE: 72 MMHG | SYSTOLIC BLOOD PRESSURE: 135 MMHG

## 2018-05-01 VITALS — SYSTOLIC BLOOD PRESSURE: 121 MMHG | DIASTOLIC BLOOD PRESSURE: 67 MMHG

## 2018-05-01 VITALS — DIASTOLIC BLOOD PRESSURE: 69 MMHG | SYSTOLIC BLOOD PRESSURE: 126 MMHG

## 2018-05-01 VITALS — SYSTOLIC BLOOD PRESSURE: 123 MMHG | DIASTOLIC BLOOD PRESSURE: 70 MMHG
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
## 2018-05-01 NOTE — SOCIAL WORKER PROG NOTE PSYCH
Social Work Progress Note
Progress Note
Attempted to Call DSS they were not taking calls this afternoon. I gave the info
to the patient as well. Will try tomorrow. [phone]

## 2018-05-01 NOTE — CP SOUTH PROGRESS NOTE PSYCH
Psych (Inpt) Progress Note
Progress Note
 
The patient's progress, treatment, and aftercare plans were reviewed and updated
in the treatment team meeting which included: RN; SCOUT, Group Therapy + Activity
therapy staff, and psychiatrist.
 
Mental Status:
+Parkinsonian tremors (unchanged from yesterday)
The patient was alert and oriented to time, place, and person. She reported that
she was still hearing voices. She continues to experience paranoid delusions.
The patient seems to be consumed with feelings of worthlessness as well as 
irrational feelings of guilt.  She is making good progress and looked brighter 
in her affect.  And she is this frequently bringing up the issue off thinking 
that she deserves to be punished or that she is a disgusting or worthless.
 The patient was less disorganized, she continues to feel worthless and 
"disgusting" 
She continues to show tardive dyskinesia & parkinsonian tremors 
The patient reported that she continues to feel depressed but she was not 
tearful during today's interview . 
The patient denied wishing death and she asserts that she "would never commit 
suicide."  She denied having violent thoughts or thoughts of homicide.
She seems to have reasonable attention and concentration and ability for 
information processing today.  There is no evidence of any significant short-
term memory impairments.
 
Assessment: 
Mesha is a 63-year-old  White female who was admitted to the inpatient 
psychiatric unit on 04/17/2018 with what seemed to be in an acute psychosis /
disorganized state. She continues to have auditory hallucinations and delusions.
Compared to when she arrived to the inpatient psychiatric unit, the patient 
seems to have made significant strides, for example the first 2 days she was on 
the notes in on the unit, she was incoherent.  Now, she is more coherent and the
symptoms are limited to the hallucinations and the paranoid delusions.  Her 
affect also seems to have brightened and she seems to be taking care of her 
personal hygiene including wearing makeup this morning.
 
Diagnoses:
Schizoaffective disorder, bipolar type
 
Treatment Plan:
Increase Artane to 2 mg twice daily for Parkinsonian tremors
Continue Gabapentin 200 mg three times daily
Continue Zyprexa 15 mg at bedtime
Continue Lorazepam 1 mg at bedtime

## 2018-05-02 VITALS — DIASTOLIC BLOOD PRESSURE: 67 MMHG | SYSTOLIC BLOOD PRESSURE: 127 MMHG

## 2018-05-02 VITALS — SYSTOLIC BLOOD PRESSURE: 130 MMHG | DIASTOLIC BLOOD PRESSURE: 83 MMHG

## 2018-05-02 VITALS — DIASTOLIC BLOOD PRESSURE: 64 MMHG | SYSTOLIC BLOOD PRESSURE: 115 MMHG

## 2018-05-02 VITALS — DIASTOLIC BLOOD PRESSURE: 63 MMHG | SYSTOLIC BLOOD PRESSURE: 129 MMHG

## 2018-05-02 NOTE — CP SOUTH PROGRESS NOTE PSYCH
Psych (Inpt) Progress Note
Progress Note
Vital Signs
 
 
Date Time Temp Pulse B/P B/P O2
 
05/02 0751 97.0 98 130/83  
 
05/01 2017 98.1 80 121/67  
 
05/01 1552  100 135/72  
 
 
The patient's progress, treatment, and aftercare plans were reviewed and updated
in the treatment team meeting which included: RN; SOCUT, Group Therapy + Activity
therapy staff, and psychiatrist.
 
Mental Status:
Mesha continues to voice delusions and continues to describe what seems audio 
hallucinations
+Parkinsonian tremors (unchanged from yesterday)
The patient was alert and oriented to time, place, and person. 
The patient seems to be consumed with feelings of worthlessness & irrational 
feelings of guilt.  She is making good progress and looked brighter in her 
affect.  
And she is this frequently bringing up the issue off thinking that she deserves 
to be punished or that she is a disgusting or worthless. The patient was less 
disorganized, she continues to feel worthless and "disgusting". 
She continues to show tardive dyskinesia & parkinsonian tremors 
The patient reported that she continues to feel depressed but she was not 
tearful during today's interview . 
Mesha denied wishing death and she asserts that she "would never commit suicide.
"  She denied having violent thoughts or thoughts of homicide.
She seems to have reasonable attention and concentration and ability for 
information processing today. 
 
Assessment: 
Mesha is a 63-year-old White female who was admitted to the inpatient 
psychiatric unit on 04/17/2018 in a state best described as acute psychosis. She
continues to have auditory hallucinations and delusions. Compared to when she 
arrived to the inpatient psychiatric unit, she seems to have made significant 
strides, for example the first 2 days she was on the notes in on the unit, she 
was incoherent.  Now, she is more coherent and the symptoms are limited to the 
hallucinations and delusions.  Her affect also seems to have brightened and she 
seems to be taking care of her personal hygiene.
 
Diagnoses:
1) Schizoaffective Disorder, Bipolar type
2) Tardive Dyskinesia
3) Parkinsonian Tremors (most likely Parkinson's Disease)
 
 
Treatment Plan:
Increase Artane to 4 mg twice daily 
Increase Sertraline (Zoloft) to 50 mg daily
Continue Gabapentin 200 mg three times daily
Continue Zyprexa 15 mg at bedtime
Continue Lorazepam 1 mg at bedtime

## 2018-05-02 NOTE — SOCIAL WORKER PROG NOTE PSYCH
Social Work Progress Note
Progress Note
Attempted to call DSS long wait on hold 40min - unable to hold longer due to 
schedule.  Need to try again early in AM.  
 
Tried to complete [facility] concurrent but patient's HUsky C lapsed 4/30/18.  UNable
to do this on [facility].  Phoned Nini at [facility] and left a voicmail as to how to 
proceed with concurrents.  ASked her to call Virgie Jacome LCSW back as soon as 
possible.
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
## 2018-05-03 VITALS — DIASTOLIC BLOOD PRESSURE: 70 MMHG | SYSTOLIC BLOOD PRESSURE: 115 MMHG

## 2018-05-03 VITALS — DIASTOLIC BLOOD PRESSURE: 90 MMHG | SYSTOLIC BLOOD PRESSURE: 139 MMHG

## 2018-05-03 VITALS — SYSTOLIC BLOOD PRESSURE: 137 MMHG | DIASTOLIC BLOOD PRESSURE: 75 MMHG

## 2018-05-03 VITALS — DIASTOLIC BLOOD PRESSURE: 61 MMHG | SYSTOLIC BLOOD PRESSURE: 130 MMHG

## 2018-05-03 NOTE — SOCIAL WORKER PROG NOTE PSYCH
Social Work Progress Note
Progress Note
PEYMAN MATHEWSMaría REYES 
[id number] 
1955 
PEYMAN MATHEWSMaría REYES 
[id number] 
Pended Authorization # 
Client Authorization # 
Type of Request 
[id number] 
[id number] 
CONCURRENT 
Date of Admission/ Start of Services 
Requested From 
Submission Date 
04/17/2018 05/03/2018 05/03/2018

## 2018-05-03 NOTE — SOCIAL WORKER PROG NOTE PSYCH
Social Work Progress Note
Progress Note
Mesha was coloring in the kitchen and attending groups today.  Asked how he mood
was today?  She said good.  I told her that I heard that her daughter visited 
last night.  Asked how it went?  She said it went good, but didn't elaborate on 
details.  She said that Jennifer was going to bring her grandchildren to see her 
tomorrow.  She said she couldn't wait to see them.  I told her Hilary Wells LCSW would be helping her with some calls today.

## 2018-05-03 NOTE — CP SOUTH PROGRESS NOTE PSYCH
Psych (Inpt) Progress Note
Progress Note
Vital Signs:
 
 
Date Time Temp Pulse B/P
 
05/03 1218  92 130/61
 
05/03 0747 96.9 90 139/90
 
05/02 1934 98.1 99 127/67
 
 
The patient's progress, treatment, and aftercare plans were reviewed and updated
in the treatment team meeting which included: RN; SCOUT, Group Therapy + Activity
therapy staff, and psychiatrist.
 
Mental Status:
Mesha continues to voice delusional thoughts and experience audio hallucinations
 alert and oriented to time, place, and person. 
feelings of worthlessness, thinking that she deserves to be punished, or that 
she is a disgusting 
However,  brighter in her affect.  
And she is this frequently bringing up the issue off, The patient was less 
disorganized, she continues to feel worthless and "disgusting". 
She continues to show tardive dyskinesia & parkinsonian tremors 
continues to feel depressed but she was not tearful during today's interview . 
Mesha denied wishing death and she asserts that she "would never commit suicide.
"  She denied having violent thoughts or thoughts of homicide.
She seems to have reasonable attention/concentration and ability for information
processing today.
 
Assessment: 
Mesha is a 63-year-old White female who was admitted to the inpatient 
psychiatric unit on 04/17/2018 in a state best described as acute psychosis. She
continues to have auditory hallucinations and delusions. Compared to when she 
arrived to the inpatient psychiatric unit, she seems more coherent and the 
symptoms are limited to the hallucinations and delusions.
 
Diagnoses:
1) Schizoaffective Disorder, Bipolar type
2) Tardive Dyskinesia
3) Parkinsonian Tremors (most likely Parkinson's Disease)
 
Treatment Plan:
Reduce Gabapentin to 100 mg three times daily
Increase Zyprexa to 20 mg at bedtime
Continue Artane 4 mg twice daily 
Continue Sertraline (Zoloft) 50 mg daily
Continue Lorazepam 1 mg at bedtime
 
 
Continue Zyprexa 15 mg at bedtime
Continue Lorazepam 1 mg at bedtime

## 2018-05-03 NOTE — SOCIAL WORKER PROG NOTE PSYCH
Social Work Progress Note
Progress Note
Left a voicemail with Colin [phone], in regards to housing.

## 2018-05-04 VITALS — DIASTOLIC BLOOD PRESSURE: 68 MMHG | SYSTOLIC BLOOD PRESSURE: 138 MMHG

## 2018-05-04 VITALS — SYSTOLIC BLOOD PRESSURE: 137 MMHG | DIASTOLIC BLOOD PRESSURE: 73 MMHG

## 2018-05-04 VITALS — SYSTOLIC BLOOD PRESSURE: 131 MMHG | DIASTOLIC BLOOD PRESSURE: 76 MMHG

## 2018-05-04 VITALS — DIASTOLIC BLOOD PRESSURE: 76 MMHG | SYSTOLIC BLOOD PRESSURE: 137 MMHG

## 2018-05-04 NOTE — SOCIAL WORKER PROG NOTE PSYCH
Social Work Progress Note
Progress Note
Called Colin attempting again to reach out about housing options for Mesha.  
Left a detailed message.  Mesha was an active participant in groups today.  I 
mentioned to Mesha that I would be seeing her this afternoon.  She said she 
really didn't have anything to talk about right now and she'd prefer not to 
meet.  I asked if her daughter and grandchildren were coming tonight?  She said 
yes and she couldn't wait to see them.  She seemed excited.  Spent time coloring
in the kitchen.

## 2018-05-04 NOTE — CP SOUTH PROGRESS NOTE PSYCH
Psych (Inpt) Progress Note
Progress Note
The patient's progress, treatment, and aftercare plans were reviewed and updated
in the treatment team meeting which included: RN; SCOUT, Group Therapy/Activity 
therapy staff, and psychiatrist.
 
Vital Signs:
Vital Signs
 
 
Date Time Temp Pulse B/P
 
05/04 0724 97.5 88 131/76
 
05/03 1929 98.6 102 115/70
 
05/03 1556  94 137/75
 
05/03 1218  92 130/61
 
 
Mental Status:
alert and oriented to time, place, and person. 
Mesha continues to have delusional thoughts (she believes Pb will come visit 
tomorrow and cut her toes and hands and poke her eyes out)
experience audio hallucinations (Pb's voice, siblings' voices, etc) 
she continues to voice feelings of worthlessness, 
However,  brighter in her affect, less disorganized, 
less pronounced tardive dyskinesia & less pronounced parkinsonian tremors 
Mesha denied wishing death and she asserts that she "would never commit suicide.
"  She denied having violent thoughts or thoughts of homicide.
She seems to have reasonable attention/concentration and ability for information
processing.
 
Assessment: 
Mesha is a 63-year-old White female who was admitted on 4/17/2018 in a state 
best described as acute psychosis. 
Although she continues to have auditory hallucinations and delusions, she is 
slowly and gradually improving 
 
Diagnoses:
1) Schizoaffective Disorder, Bipolar type
2) Tardive Dyskinesia
3) Parkinsonian Tremors (most likely Parkinson's Disease)
 
Treatment Plan:
Continue Gabapentin 100 mg three times daily
Continue Zyprexa 20 mg at bedtime
Continue Artane 4 mg twice daily 
Continue Sertraline (Zoloft) 50 mg daily
Continue Lorazepam 1 mg at bedtime

## 2018-05-05 VITALS — DIASTOLIC BLOOD PRESSURE: 67 MMHG | SYSTOLIC BLOOD PRESSURE: 135 MMHG

## 2018-05-05 VITALS — SYSTOLIC BLOOD PRESSURE: 157 MMHG | DIASTOLIC BLOOD PRESSURE: 77 MMHG

## 2018-05-05 VITALS — SYSTOLIC BLOOD PRESSURE: 149 MMHG | DIASTOLIC BLOOD PRESSURE: 76 MMHG

## 2018-05-05 VITALS — SYSTOLIC BLOOD PRESSURE: 145 MMHG | DIASTOLIC BLOOD PRESSURE: 98 MMHG
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
## 2018-05-06 VITALS — SYSTOLIC BLOOD PRESSURE: 142 MMHG | DIASTOLIC BLOOD PRESSURE: 78 MMHG

## 2018-05-06 VITALS — SYSTOLIC BLOOD PRESSURE: 131 MMHG | DIASTOLIC BLOOD PRESSURE: 73 MMHG

## 2018-05-06 VITALS — SYSTOLIC BLOOD PRESSURE: 158 MMHG | DIASTOLIC BLOOD PRESSURE: 80 MMHG

## 2018-05-06 VITALS — DIASTOLIC BLOOD PRESSURE: 73 MMHG | SYSTOLIC BLOOD PRESSURE: 126 MMHG

## 2018-05-06 NOTE — CP SOUTH PROGRESS NOTE PSYCH
Psych (Inpt) Progress Note
Progress Note
Include the following elements, when applicable:
Involvement in the active treatment of the patient with behavioral observations 
of the patient and the patient's response to the treatment.
Review of the ongoing treatment process in the context of the treatment plan.
Indication of how multi-disciplinary staff members are carrying out the 
treatment plan.
Plans for future interventions and recommendations for revision of the treatment
plan.
Liaison with other physicians/providers.
Progress Note:
Case and treatment plan discussed with nurse.  Nurse reports there has been no 
change.  Intermittently makes bizarre comments.  Not appearing depressed.  
Denying suicidal ideation.  Has fine tremor at hands and mouth.
 
Patient seen at 9:53 AM.  She was watching TV labs but got up and met with me in
office.  States "I'm very well.  This is the best time ever."  Asking when she 
will be discharged.  Affect is calm and euthymic.  Mood is good.  Rates sad mood
10/10, only about the past.  Rates anxiety 1/10.  Denies feeling hopeless, 
helpless or worthless.  Feels guilty a little bit.  Denies active and passive 
suicidal ideation.  Denies homicidal ideation.  Reports intermittent auditory 
hallucinations and just now she heard her daughter's voice say "sit up straight.
"  Reports she had minimal voices for the first 16 days this admission but they 
came back on the 17th day, her sister's voice about sexual topics.  Denies 
having visual hallucinations lately.  Denies paranoid ideation.  Reports she 
slept well.  States she finally has an appetite.  Reports everything is going so
well and she is socializing.  States she is getting her energy back slowly.  
Tolerating medications except does not like metformin because it is chaulky and 
"because I did not meet four men."
 
IMPRESSION:
Slow progress.  Continue present treatment plan.

## 2018-05-07 VITALS — SYSTOLIC BLOOD PRESSURE: 121 MMHG | DIASTOLIC BLOOD PRESSURE: 69 MMHG

## 2018-05-07 VITALS — SYSTOLIC BLOOD PRESSURE: 140 MMHG | DIASTOLIC BLOOD PRESSURE: 71 MMHG

## 2018-05-07 VITALS — DIASTOLIC BLOOD PRESSURE: 69 MMHG | SYSTOLIC BLOOD PRESSURE: 140 MMHG

## 2018-05-07 VITALS — DIASTOLIC BLOOD PRESSURE: 64 MMHG | SYSTOLIC BLOOD PRESSURE: 141 MMHG

## 2018-05-07 NOTE — CP SOUTH PROGRESS NOTE PSYCH
Psych (Inpt) Progress Note
Progress Note
Include the following elements, when applicable:
Involvement in the active treatment of the patient with behavioral observations 
of the patient and the patient's response to the treatment.
Review of the ongoing treatment process in the context of the treatment plan.
Indication of how multi-disciplinary staff members are carrying out the 
treatment plan.
Plans for future interventions and recommendations for revision of the treatment
plan.
Liaison with other physicians/providers.
Progress Note:
I discussed this patient's progress to date, current mental status, treatment 
process in the context of the treatment plan, and discharge planning with staff/
team in the daily morning inpatient team meeting. I also met with the patient  
myself in individual session.
 
A total of 15 minutes was spent with the patient with more than 50% spent in 
counseling and/or coordination of care.
 
SUBJECTIVE: "I am Maldonado.  I am here to teach people.  My  sent me here 
because he wants me to be better sexually."
 
OBJECTIVE:
 Current Medications
 
 
  Sig/Sharan Start time  Last
 
Medication Dose Route Stop Time Status Admin
 
Acetaminophen 650 MG Q6P PRN 04/17 2200 AC 
 
  PO   
 
Al Hydroxide/Mg  30 ML Q4-6 PRN PRN 04/17 2200 AC 
 
Hydroxide  PO   
 
Aspirin 81 MG DAILY 04/18 0900 AC 05/07
 
  PO   0815
 
Atorvastatin Calcium 40 MG 1700 04/18 1700 AC 05/06
 
  PO   1611
 
Benzocaine/Menthol 1 ADRIANA Q4 HRS AS NEEDED PRN 04/26 1200 AC 04/30
 
  PO   1014
 
Gabapentin 100 MG TID 05/03 1400 AC 05/07
 
  PO   0815
 
Levothyroxine Sodium 0.137 MG DAILY AC 04/18 0700 AC 05/07
 
  PO   0659
 
Lorazepam 1 MG 2000 05/03 2000 AC 05/06
 
  PO   2037
 
Lorazepam 0.5 MG Q4P PRN 04/30 1100 DC 05/04
 
  PO 05/07 1059  1726
 
Magnesium Hydroxide 30 ML AT BEDTIME PRN 04/17 2200 AC 04/24
 
  PO   1940
 
Metformin HCl 1,000 MG 0800,1700 04/18 0800 AC 05/07
 
  PO   0815
 
Olanzapine 30 MG 2000 05/07 2000 AC 
 
  PO   
 
Olanzapine 20 MG 2000 05/03 2000 DC 05/06
 
  PO   2037
 
Sertraline HCl 50 MG 0800 05/03 0800 AC 05/07
 
  PO   0815
 
Trihexyphenidyl HCl 4 MG DAILY 05/03 0900 AC 05/07
 
  PO   0815
 
 
Vital Signs
 
 
Date Time Temp Pulse Resp B/P B/P Pulse O2 O2 Flow FiO2
 
     Mean Ox Delivery Rate 
 
05/07 1217  96  141/64     
 
05/07 0738 97.4 98  140/69     
 
05/06 1949 97.3 91  142/78     
 
05/06 1601  84  131/73     
 
 
 
ASSESSMENT: Patient presents today calm, cooperative and pleasant.  Laughing at 
times.  Patient was found today in her bed at approximately 2:00 PM, easily 
arousable.  Ambulated with steady gait to my office.  Patient continues to have 
delusional thoughts, and to speak about sexual matters.  Answers questions in a 
tangential manner.  Reports depression and anxiety "come and go."
She reports sleeping well at night.  Reports that her appetite is improving.  
States that her concentration is good.  Her insight is very poor.
 
Denies suicidal ideation, homicidal ideation, visual hallucinations, paranoid 
ideation.
 
Speech is well articulated, goal-directed, average in rate, volume and tone.
 
The patient understands the risks/benefits/side effects of the medication and is
agreeable to continue taking them.
 
PLAN:
1.  Increase Zyprexa to 30 mg p.o. nightly for continuing auditory 
hallucinations and delusional thoughts.
Continue with other current management.
Continue to provide support and encouragement.
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
## 2018-05-07 NOTE — SOCIAL WORKER PROG NOTE PSYCH
Social Work Progress Note
Progress Note
 
 
 
The services requested require additional review. You will be contacted 
regarding the status of this request if further information is needed. An 
authorization decision will be made within the required timeframes and details 
of that decision may be found under the member's authorization history.
 
 
 
 
Member Name Member ID Member  Subscriber Name  Subscriber ID 
 
PEYMAN BEAULIEUY [id number] PEYMAN REYES  [id number]
 
    
 
Pended Authorization # Client Authorization # Type of Request  
 
[id number]  CONCURRENT   
 
    
 
Date of Admission/ Start of Services Requested From Submission Date  
 
2018   
 
    
 
Level of Service  Type of Service Level of Care  Type of Care  
 
INPATIENT/[facility] Mental Health Inpatient  [facility] 
 
 
    
 
Reason Code    
 
P77    
 
    
 
Provider Name & Address Provider ID Provider Alternate ID NPI # for 
Authorization 
 
HAZEL THIBODEAUX  [id number]   
 
[address] [place] [id number]

## 2018-05-08 VITALS — SYSTOLIC BLOOD PRESSURE: 143 MMHG | DIASTOLIC BLOOD PRESSURE: 70 MMHG

## 2018-05-08 VITALS — SYSTOLIC BLOOD PRESSURE: 122 MMHG | DIASTOLIC BLOOD PRESSURE: 57 MMHG

## 2018-05-08 VITALS — DIASTOLIC BLOOD PRESSURE: 76 MMHG | SYSTOLIC BLOOD PRESSURE: 141 MMHG

## 2018-05-08 VITALS — DIASTOLIC BLOOD PRESSURE: 73 MMHG | SYSTOLIC BLOOD PRESSURE: 143 MMHG
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
## 2018-05-08 NOTE — SOCIAL WORKER PROG NOTE PSYCH
Social Work Progress Note
Progress Note
Trip BLACKWOOD and I met with Mesha together this morning.  Mesha reported 
having a vivid nightmare/ dream about Jarocho.  I asked if they have spoken?  She 
said she has called him 4 times.  I asked her what they have been talking about?
 She didn't say other than as long as she is not with other men he accepts her 
back.  It seems like she is looking to be with him again, now that she is 
feeling less depressed.  She is only feeling depressed when she experiences 
negative voices that say bad things.  Talked about how she feels confused and 
split down the middle between the good Mesha and bad Mesha.  Ultimately she 
talked about how she would like to return to [facility].  I told her that she needed 
more time in the hospital to get stable, but if that is her choice in the long 
run maybe we should have Jarocho in for a meeting.  Continuing to hear voices.  
This was evident as we talked as she would pause and say things like "I hear 
Jarocho and Renee are  now."

## 2018-05-09 VITALS — SYSTOLIC BLOOD PRESSURE: 141 MMHG | DIASTOLIC BLOOD PRESSURE: 74 MMHG

## 2018-05-09 VITALS — SYSTOLIC BLOOD PRESSURE: 125 MMHG | DIASTOLIC BLOOD PRESSURE: 71 MMHG

## 2018-05-09 VITALS — DIASTOLIC BLOOD PRESSURE: 63 MMHG | SYSTOLIC BLOOD PRESSURE: 133 MMHG

## 2018-05-09 VITALS — SYSTOLIC BLOOD PRESSURE: 138 MMHG | DIASTOLIC BLOOD PRESSURE: 68 MMHG
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
## 2018-05-09 NOTE — SOCIAL WORKER PROG NOTE PSYCH
Social Work Progress Note
Progress Note
Talked to Mesha who was laying  in bed in her room.  She said she was tired and 
just wanted to rest.  Was up this morning.  She says she has been sleeping at 
night.  Continuing to experience auditory hallucinations of Jarocho talking to her
and Petra's (nursing director) voice telling her Jarocho and Renee are .  
She keeps saying that Jarocho is going to pick her up and she's packed and ready 
to go.  She reports that Jarocho came to her in her room last night and made love 
to her.  I told her that as much as she may want to go with Jarocho, we may need 
another discharge option.  Talked about [facility] in [facility] being a possible 
option.  She agreed to sign the release.  She talked about wishing she won the 
lottery and what she would do with the money.  She talks about giving it to the 
homeless people in need and to children with disabilities.  I told her she has a
good heart.  Overall mood was positive.

## 2018-05-10 VITALS — SYSTOLIC BLOOD PRESSURE: 138 MMHG | DIASTOLIC BLOOD PRESSURE: 65 MMHG

## 2018-05-10 VITALS — DIASTOLIC BLOOD PRESSURE: 67 MMHG | SYSTOLIC BLOOD PRESSURE: 151 MMHG

## 2018-05-10 VITALS — SYSTOLIC BLOOD PRESSURE: 127 MMHG | DIASTOLIC BLOOD PRESSURE: 76 MMHG

## 2018-05-10 VITALS — SYSTOLIC BLOOD PRESSURE: 136 MMHG | DIASTOLIC BLOOD PRESSURE: 78 MMHG

## 2018-05-10 NOTE — SOCIAL WORKER PROG NOTE PSYCH
Social Work Progress Note
Progress Note
Mesha approached me this morning and said "we don't have to meet today."  I told
her that I will check in with her later.  
Faxed a referral packet to Natalie Reynaga at [facility].  
Mesha was apparently on the phone with Jarocho this afternoon.  I talked with her 
a few minutes later.  She said Jarocho was on the phone and that he's coming to 
get her.  She said that they are going to leave and "disappear into thin air."  
I asked what that meant?  She asked if I saw the movie Ground Hog day?  She kept
saying "that's me and Jarocho."  Mesha went on to talk about how he family loves 
Jarocho.  She was very tangential today.  She said her mood was "elevated."  She 
was starting to ask this writer personal sexual questions at the end of our 
discussion.  I told her those were personal questions and not appropriate.  She 
started talking about sexual things between her and Jarocho.  I told her that she 
could keep those details of their relationship to herself as they weren't 
relevant to our work.
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
## 2018-05-11 VITALS — SYSTOLIC BLOOD PRESSURE: 138 MMHG | DIASTOLIC BLOOD PRESSURE: 55 MMHG

## 2018-05-11 VITALS — SYSTOLIC BLOOD PRESSURE: 122 MMHG | DIASTOLIC BLOOD PRESSURE: 89 MMHG

## 2018-05-11 VITALS — DIASTOLIC BLOOD PRESSURE: 73 MMHG | SYSTOLIC BLOOD PRESSURE: 139 MMHG

## 2018-05-11 VITALS — DIASTOLIC BLOOD PRESSURE: 70 MMHG | SYSTOLIC BLOOD PRESSURE: 135 MMHG

## 2018-05-11 NOTE — SOCIAL WORKER PROG NOTE PSYCH
Social Work Progress Note
Progress Note
Talked to Natalie Reynaga at [facility].  She informed me that Mesha doesn't have
the right insurance for their program and she would need to apply for Medicaid 
long term care and get approved before they could accept her.
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
## 2018-05-11 NOTE — CP SOUTH PROGRESS NOTE PSYCH
Psych (Inpt) Progress Note
Progress Note
The treatment team discussed patient's progress, current treatment plan, and 
aftercare plans. 
The team included: nursing staff, LCSW, Therapists, and psychiatrist.
Vital Signs
 
 
Date Time Temp Pulse B/P B/P Pulse O2 FiO2
 
05/11 0738 97.6 91 122/89    
 
05/10 1948 97.6 97 151/67    
 
05/10 1552  92 138/65    
 
05/10 1215  94 136/78    
 
 
 
Mental Status:
She claims to have slept only 3 hours last night. The unit's sleep log indicated
that she "slept well"
Patient continues to have delusional thoughts about what Jarocho is going to do to
her. She denied suicidal ideation, homicidal ideation, seemed more talkative but
not pressured, mostly perseverative/obsessive, she also described some 
compulsive behaviors (such as counting when doing simple things like putting eye
shadow, applying deodorant, etc.). Mesha continues to voice delusions and 
continues to describe what seems audio hallucinations. She continues to show 
tardive dyskinesia & parkinsonian tremors. She reported that she continues to 
feel depressed but she was not tearful during today's interview. Mesha denied 
wishing death and she asserts that she "would never commit suicide."  She denied
having violent thoughts or thoughts of homicide. 
 
Assessment: 
Mesha is a 63-year-old White female who was admitted to the inpatient 
psychiatric unit on 04/17/2018 in a state best described as acute psychosis. 
she is more coherent but continues to have hallucinations and delusions.  Her 
affect also seems to have brightened and she seems to be taking care of her 
personal hygiene. Some OCD spectrum symptoms and signs are present
Diagnoses:
1) Schizoaffective Disorder, Bipolar type
2) Tardive Dyskinesia
3) Parkinsonian Tremors (most likely Parkinson's Disease)
 
Treatment Plan:
Continue Artane 4 mg twice daily 
Continue Zyprexa 20 mg at bedtime
Change Lorazepam to Klonopin 1 mg at bedtime

## 2018-05-12 VITALS — DIASTOLIC BLOOD PRESSURE: 78 MMHG | SYSTOLIC BLOOD PRESSURE: 135 MMHG

## 2018-05-12 VITALS — DIASTOLIC BLOOD PRESSURE: 72 MMHG | SYSTOLIC BLOOD PRESSURE: 132 MMHG

## 2018-05-12 VITALS — SYSTOLIC BLOOD PRESSURE: 130 MMHG | DIASTOLIC BLOOD PRESSURE: 77 MMHG

## 2018-05-12 VITALS — DIASTOLIC BLOOD PRESSURE: 70 MMHG | SYSTOLIC BLOOD PRESSURE: 139 MMHG

## 2018-05-12 NOTE — CP SOUTH PROGRESS NOTE PSYCH
Psych (Inpt) Progress Note
Progress Note
 
Vital Signs
 
 
Date Time Temp Pulse Resp B/P B/P Pulse O2 O2 Flow FiO2
 
     Mean Ox Delivery Rate 
 
05/12 1550  92  135/78     
 
05/12 1211  96  139/70     
 
05/12 0736 97.0 92  132/72     
 
05/11 2004 98.7 96  138/55     
 
 
 
 
Mental Status:
Mehsa continues to voice delusions and continues to describe what seems audio 
hallucinations. 
She was up 4 times last night.
Patient continues to have delusional thoughts about what Jarocho is going to do to
her. 
She denied suicidal ideation, denied homicidal ideation, 
talkative but not pressured, perseverative/obsessive, 
less tardive dyskinesia & parkinsonian tremors. 
denied feeling depressed 
she was not tearful , denied wishing death and she asserts that she "would never
commit suicide."  She denied having violent thoughts or thoughts of homicide. 
 
Assessment: 
Mesha is a 63-year-old White female who was admitted to the inpatient 
psychiatric unit on 04/17/2018 in a state best described as acute psychosis. 
slow progress, continues to have hallucinations and delusions.  Her affect also 
seems to have brightened , some OCD spectrum symptoms and signs are present
Diagnoses:
1) Schizoaffective Disorder, Bipolar type
2) Tardive Dyskinesia
3) Parkinsonian Tremors (most likely Parkinson's Disease)
 
Treatment Plan:
Continue Artane 2 mg twice daily 
Continue Zyprexa 20 mg at bedtime
Klonopin 1 mg at bedtime

## 2018-05-13 VITALS — DIASTOLIC BLOOD PRESSURE: 55 MMHG | SYSTOLIC BLOOD PRESSURE: 135 MMHG

## 2018-05-13 VITALS — DIASTOLIC BLOOD PRESSURE: 93 MMHG | SYSTOLIC BLOOD PRESSURE: 130 MMHG

## 2018-05-13 VITALS — DIASTOLIC BLOOD PRESSURE: 78 MMHG | SYSTOLIC BLOOD PRESSURE: 141 MMHG

## 2018-05-13 VITALS — SYSTOLIC BLOOD PRESSURE: 132 MMHG | DIASTOLIC BLOOD PRESSURE: 70 MMHG

## 2018-05-13 NOTE — CP SOUTH PROGRESS NOTE PSYCH
Psych (Inpt) Progress Note
Progress Note
Vital Signs
 
 
Date Time Temp Pulse B/P Pulse O2 FiO2
 
05/13 1204  87 132/70   
 
05/13 0733 96.5 98 130/93   
 
05/12 2011 98.5 99 130/77   
 
 
 
Mental Status:
Mesha was less delusional, but continues to describe what seems audio 
hallucinations. 
She slept much better last night.
She denied suicidal ideation, denied homicidal ideation, 
talkative but not pressured, less perseverative/obsessive, 
less tardive dyskinesia & parkinsonian tremors. 
denied feeling depressed 
she was not tearful , denied wishing death and she asserts that she "would never
commit suicide."  She denied having violent thoughts or thoughts of homicide. 
 
Assessment: 
Mesha is a 63-year-old White female who was admitted to the inpatient 
psychiatric unit on 04/17/2018 in a state best described as acute psychosis. 
slow progress, continues to have hallucinations and delusions.  Her affect also 
seems to have brightened , some OCD spectrum symptoms and signs are present
Diagnoses:
1) Schizoaffective Disorder, Bipolar type
2) Tardive Dyskinesia
3) Parkinsonian Tremors (most likely Parkinson's Disease)
 
Treatment Plan:
Increase Artane back to 4 mg twice daily 
Continue Zyprexa 20 mg at bedtime
Klonopin 1 mg at bedtime

## 2018-05-14 VITALS — DIASTOLIC BLOOD PRESSURE: 65 MMHG | SYSTOLIC BLOOD PRESSURE: 139 MMHG

## 2018-05-14 VITALS — DIASTOLIC BLOOD PRESSURE: 69 MMHG | SYSTOLIC BLOOD PRESSURE: 133 MMHG

## 2018-05-14 VITALS — SYSTOLIC BLOOD PRESSURE: 139 MMHG | DIASTOLIC BLOOD PRESSURE: 61 MMHG

## 2018-05-14 VITALS — DIASTOLIC BLOOD PRESSURE: 80 MMHG | SYSTOLIC BLOOD PRESSURE: 136 MMHG

## 2018-05-14 NOTE — CP SOUTH PROGRESS NOTE PSYCH
Psych (Inpt) Progress Note
Progress Note
Vital Signs
 
 
Date Time Temp Pulse B/P B/P O2 O2 Flow FiO2
 
05/14 1214  89 139/61    
 
05/14 0728 97.3 90 136/80    
 
05/13 1939 97.6 88 141/78    
 
05/13 1537  98 135/55    
 
 
 
Mental Status:
Mesha remains delusional (believes she is Maldonado, she is very wealthy, etc). 
continues to describe what seems audio hallucinations. 
She claims to continue to have multiple awakenings at night, denied suicidal 
ideation, denied homicidal ideation, 
talkative but not pressured, less perseverative/obsessive, 
Continues to show tardive dyskinesia & parkinsonian tremors. denied feeling 
depressed, she was not tearful , denied wishing death and she asserts that she 
"would never commit suicide."  She denied having violent thoughts or thoughts of
homicide. 
 
Assessment: 
Mesha is a 63-year-old White female who was admitted to the inpatient 
psychiatric unit on 04/17/2018 in a state best described as acute psychosis. 
slow progress, continues to have hallucinations and delusions.  Her affect also 
seems to have brightened , some OCD spectrum symptoms and signs are present
Diagnoses:
1) Schizoaffective Disorder, Bipolar type
2) Tardive Dyskinesia
3) Parkinsonian Tremors (most likely Parkinson's Disease)
 
Treatment Plan:
Reduce Artane back to 2 mg twice daily 
Increase Zyprexa to 25 mg at bedtime
Increase Klonopin to 1.5 mg at bedtime
 Current Medications
 
 
Acetaminophen 650 MG Q6P PRN 04/17 2200 AC 
 
Aspirin 81 MG DAILY 04/18 0900 AC 05/14
 
  PO   0745
 
Atorvastatin Calcium 40 MG 1700 04/18 1700 AC 05/13
 
  PO   1724
 
Benzocaine/Menthol 1 ADRIANA Q4 HRS AS NEEDED PRN 04/26 1200 AC 05/13
 
Clonazepam 1.5 MG 2000 05/14 2000 UNVr 
 
  PO 05/21 1959  
 
Clonazepam 1 MG 2000 05/11 2000 DC 05/13
 
  PO 05/18 1959  1943
 
Levothyroxine Sodium 0.137 MG DAILY AC 04/18 0700 AC 05/14
 
  PO   0617
 
Lorazepam 0.5 MG Q4P PRN 05/13 1030 AC 05/14
 
Magnesium Hydroxide 30 ML AT BEDTIME PRN 04/17 2200 AC 04/24
 
  PO   1940
 
Metformin HCl 500 MG 0800,1700 05/11 0800 AC 05/14
 
  PO   0745
 
Olanzapine 25 MG 2000 05/14 2000 AC 
 
  PO   
 
Olanzapine 20 MG 2000 05/10 2000 DC 05/13
 
  PO   1943
 
Trihexyphenidyl HCl 2 MG 0800,1500 05/14 1500 UNVr 
 
  PO   
 
Trihexyphenidyl HCl 4 MG 0800,1500 05/13 1500 DC 05/14
 
  PO   0745

## 2018-05-14 NOTE — SOCIAL WORKER PROG NOTE PSYCH
Social Work Progress Note
Progress Note
Spoke with Nini/[facility]  - informed her that we are unable to get into Mesha's 
[facility] to do the concurrent review. Nini stated she has to put in a request to
havbe file reopened.  She will call Virgie ROSAS tomorrow morning and inform when file
is reopened - to complete concurrent.  Use concurrent start date 5/14/18.

## 2018-05-14 NOTE — SOCIAL WORKER PROG NOTE PSYCH
Social Work Progress Note
Progress Note
Mesha was looking to meet with me early in the day.  She said she had some 
family here yesterday for Mother's Day and her visits went well.  I shared that 
[facility] will not be an option due to insurance reasons.  She said that was 
fine, because her plan is still to live with Jarocho.  She continues to say that 
he is coming to get her and it is going to be like "Ground Hog Day."  She says 
he will not hurt her as long as she does not pursue another man.  I told her 
that we really should have family in to discuss her discharge.  She said "let's 
call Jarocho right now."  She proceeded to give me his phone number [phone]. 
I attempted to call with her in the room and it went right to voicemail.  Left a
message for him to call.  
Mesha talked about voices that are "bad" in her left ear and "good" voices in 
her right ear.  The bad voices are saying things about beating her up and the 
good voices seemed to be more sexual in nature, which I encouraged her to keep 
to herself.  
She then said "Jarocho is here right now".  I asked where?  She said "In the room.
"  She then said he was watching her through the cameras.  She seemed happy 
about that.  Continues to report that her mood is still a little "elevated."
 
Spoke with Mesha's sister Meme, she called to speak to me.  She asked about 
Mesha's plan.  I told her [facility] wasn't an option right now due to 
insurance.  She does not want Mesha involved with Jarocho and wants her to stay 
away from him.  She talked about pursuing conservatorship with [address] [employer] so 
that someone can have say over where Mesha is going to reside.  She would like 
to have a meeting here at the hospital to discuss her plans.  She mentioned 
Wednesday as being a possible day.  She would like to involve Jolie's daughter 
Jennifer as well.  
Meme's number is [phone].

## 2018-05-15 VITALS — DIASTOLIC BLOOD PRESSURE: 85 MMHG | SYSTOLIC BLOOD PRESSURE: 140 MMHG

## 2018-05-15 VITALS — SYSTOLIC BLOOD PRESSURE: 131 MMHG | DIASTOLIC BLOOD PRESSURE: 79 MMHG

## 2018-05-15 VITALS — DIASTOLIC BLOOD PRESSURE: 71 MMHG | SYSTOLIC BLOOD PRESSURE: 126 MMHG

## 2018-05-15 VITALS — SYSTOLIC BLOOD PRESSURE: 131 MMHG | DIASTOLIC BLOOD PRESSURE: 81 MMHG

## 2018-05-15 NOTE — SOCIAL WORKER PROG NOTE PSYCH
Social Work Progress Note
Progress Note
PEYMAN MATHEWSMaría REYES
[id number]
1955
PEYMAN MATHEWSMaría REYES 
[id number]
 
Pended Authorization #
Client Authorization #
Type of Request
 
[id number]
[id number]
CONCURRENT
 
 
Date of Admission/ Start of Services
Requested From
Submission Date
   
04/17/2018
05/14/2018
05/15/2018

## 2018-05-15 NOTE — CP SOUTH PROGRESS NOTE PSYCH
Psych (Inpt) Progress Note
Progress Note
Vital Signs
 
 
Date Time Temp Pulse B/P
 
05/15 1209  89 140/85
 
05/15 0804 98.2 92 131/79
 
05/14 1959 98.6 96 139/65
 
Mental Status Examination:
Mesha remains delusional with Baptism, grandiose, and sexual themes.
Continues to have audio hallucinations. 
She denied suicidal ideation, denied homicidal ideation, 
Mesha remains talkative and pressured.
Mesha continues to show tardive dyskinesia & parkinsonian tremors. She denied 
feeling depressed, she was not tearful , denied wishing death and she asserts 
that she "would never commit suicide."  She denied having violent thoughts or 
thoughts of homicide. 
 
Assessment: 
Mesha Neri is a 63-year-old  White female who was admitted on 4/17/
2018 in a state of acute psychosis. 
Mesha has shown some progress (slow progress), she ontinues to have 
hallucinations and delusions as well as some OCD spectrum symptoms and signs
Diagnoses:
1) Schizoaffective Disorder, Bipolar type
2) Tardive Dyskinesia
3) Parkinsonian Tremors (most likely Parkinson's Disease)
 
Treatment Plan:
D/C Artane (it seems that every time Iincrease it, she gets more delusions)
Zyprexa 25 mg at bedtime
Klonopin 1.5 mg at bedtime
 
 
Aspirin 81 MG DAILY 04/18 0900 AC 05/14
 
Atorvastatin Calcium 40 MG 1700 04/18 1700 AC 05/13
 
Benzocaine/Menthol 1 ADRIANA Q4 HRS AS NEEDED PRN 04/26 1200 AC 05/13
 
Levothyroxine Sodium 0.137 MG DAILY AC 04/18 0700 AC 05/14
 
Metformin HCl 500 MG 0800,1700 05/11 0800 AC 05/14

## 2018-05-15 NOTE — SOCIAL WORKER PROG NOTE PSYCH
Social Work Progress Note
Progress Note
Called Meme Zimmer's Sister to see about having a meeting for tomorrow.  Meme 
said she wasn't able to come in tomorrow.  She seemed to pull back about 
conservatorship and started asking us what our plan was with her since she's in 
our care.  I explained that we normally ask family to file for conservatorship 
when family is involved.  She asked that I call Jennifer (daughter) to see if she 
was coming in.  I told her I would call, but also offered her another day to 
come and meet if tomorrow didn't work.  She said she is available on Friday at 
10:30am.  I called Jennifer and left a message extending an invitation for her to 
come in to meet tomorrow or Friday the latest.

## 2018-05-15 NOTE — SOCIAL WORKER PROG NOTE PSYCH
Social Work Progress Note
Progress Note
Pt states she is going to the [facility] for a week with Jarocho, and they are going 
to be like the movie "groundhog day, Im so excited I could pop" she says.  Pt 
states she doesn't get mad very often , "but I'm mad a faye mercado and anoop, 
for lying, they messed it up for some many people, I could just choke them", she
also mentions "the boss" in terms of telling her she is naive and stupid.  Pt 
does not appear to be able to use good judgment, and is vulnerable. "People can 
plan right in front of me and I don't even know it".

## 2018-05-16 VITALS — DIASTOLIC BLOOD PRESSURE: 82 MMHG | SYSTOLIC BLOOD PRESSURE: 134 MMHG

## 2018-05-16 VITALS — DIASTOLIC BLOOD PRESSURE: 86 MMHG | SYSTOLIC BLOOD PRESSURE: 136 MMHG

## 2018-05-16 VITALS — DIASTOLIC BLOOD PRESSURE: 60 MMHG | SYSTOLIC BLOOD PRESSURE: 129 MMHG

## 2018-05-16 VITALS — DIASTOLIC BLOOD PRESSURE: 56 MMHG | SYSTOLIC BLOOD PRESSURE: 128 MMHG

## 2018-05-16 NOTE — CP SOUTH PROGRESS NOTE PSYCH
Psych (Inpt) Progress Note
Progress Note
The treatment team discussed the patient's progress, treatment plan, and 
aftercare plans. The treatment team included: LCSW, nursing staff, therapy staff
, and psychiatrist.
 
Vital Signs
 
 
Date Temp Pulse B/P
 
05/16  94 136/86
 
05/16 97.9 100 128/56
 
05/15 98.2 100 126/71
 
Mental Status Examination:
Mesha was alert and oriented to time, place, and person.  She was talkative with
some pressure.  There was no slurring of words and she did not seem to be 
oversedated or overmedicated.  Mesha remains delusional with Confucianism, 
grandiose, and sexual themes. She continues to have audio hallucinations. She 
denied suicidal ideation and denied homicidal ideation. Mesha remains talkative 
and pressured. Mesha continues to show tardive dyskinesia & parkinsonian 
tremors. She denied feeling depressed, she was not tearful, denied wishing death
and she asserts that she "would never commit suicide."  She denied having 
violent thoughts or thoughts of homicide. 
 
Assessment: 
Mesha Neri is a 63-year-old  White Female who was admitted on 4/17/
2018 in a state of acute psychosis. 
Mesha has shown imptovement in her coherence/thought process but she ontinues to
have hallucinations and significant delusions as well as some OCD-spectrum 
symptoms and signs
Diagnoses:
1) Schizoaffective Disorder, Bipolar type
2) Tardive Dyskinesia
3) Parkinsonian Tremors (most likely Parkinson's Disease)
 
Treatment Plan Update:
1) Increase Zyprexa to 30 mg at bedtime (as she refused to consider Lithium or 
Clozapine)
2) Increase Klonopin t0 2.5 mg at bedtime
 
 
Aspirin 81 MG DAILY 04/18 0900
 
Atorvastatin Calcium 40 MG 1700 04/18 1700
 
Levothyroxine Sodium 0.137 MG DAILY AC 04/18 0700
 
Metformin HCl 500 MG 0800,1700 05/11 0800

## 2018-05-16 NOTE — SOCIAL WORKER PROG NOTE PSYCH
Social Work Progress Note
Progress Note
Mesha started talking this morning about Jarocho coming to the unit every night 
and making her "scream."  I told her she needed to keep the sexual comments to 
herself.  She stated "he's watching me."  I told her that I was planning to have
a meeting with her Sister Meme Friday to discuss d/c planning.  She said "there 
is only one option for me and that's to be with Jarocho."  She then said if she 
weren't with him she would "shrival up and die." She said "I'm not going to 
[facility]." She continues to tell me her mood is "elevated."  I tried to 
explore why she was resistant in taking Lithium.  She got a little irritated and
stated "I am absolutely never going on Lithium again."  She reported that it 
made her tired and she fell.  Continues to have auditory hallucinations.  Denies
that the voices are saying anything bad to her.  She told me that when she 
leaves here she doesn't want to be on any medication other than an aspirin and a
multi-vitamin.  She said she hates medication.  She felt the need to share with 
me that she was walking behind the nurses station last night and that she has 
"broke every rule in this place."  She proceeded to laugh and get quite giddy 
about it.  
 
Approached Mesha later and asked if she would be interested in a referral to  
Care?  Asked if she has had their services before?  She said she had a long time
ago for only a brief time.  She seemed a bit paranoid about me asking her, 
asking if she would have to stay there?  I told her that it was to help her 
connect to therapy, case management, and housing.  She said "I already have a 
place to live.  She continues to say she is living with Jarocho.  I told her  
Care could help her with housing if she was in need.  She looked ambivalent and 
said "I may be busy."  I encouraged her to think about it.  She said okay.

## 2018-05-17 VITALS — DIASTOLIC BLOOD PRESSURE: 75 MMHG | SYSTOLIC BLOOD PRESSURE: 132 MMHG

## 2018-05-17 VITALS — DIASTOLIC BLOOD PRESSURE: 90 MMHG | SYSTOLIC BLOOD PRESSURE: 124 MMHG

## 2018-05-17 VITALS — SYSTOLIC BLOOD PRESSURE: 135 MMHG | DIASTOLIC BLOOD PRESSURE: 68 MMHG

## 2018-05-17 VITALS — DIASTOLIC BLOOD PRESSURE: 68 MMHG | SYSTOLIC BLOOD PRESSURE: 117 MMHG

## 2018-05-17 NOTE — CP SOUTH PROGRESS NOTE PSYCH
Psych (Inpt) Progress Note
Progress Note
The treatment team discussed the patient's progress, treatment plan, and 
aftercare plans. The treatment team included: LCSW, RNs, therapy staff, and 
psychiatrist.
 
Vital Signs
Vital Signs
 
 
Date Time Temp Pulse B/P FiO2
 
05/16 2042 97.0 68 129/60 
 
05/16 1552  92 134/82 
 
05/16 1147  94 136/86 
 
 
Mental Status Examination:
Mesha was talkative with some pressure. Mesha remains delusional with Buddhism,
grandiose, and sexual themes. She continues to have audio hallucinations. She 
denied suicidal ideation and denied homicidal ideation. 
She was alert and oriented to time, place, and person.   
She denied feeling depressed, she was not tearful, denied wishing death and she 
asserts that she "would never commit suicide."  She denied having violent 
thoughts or thoughts of homicide. 
 
Assessment: 
Mesha Neri is a 63-year-old  White Female who was admitted on 4/17/
2018 in a state of acute psychosis. 
Mesha has shown imptovement in her coherence/thought process but she ontinues to
have hallucinations and significant delusions as well as some OCD-spectrum 
symptoms and signs
Diagnoses:
1) Schizoaffective Disorder, Bipolar type
2) Tardive Dyskinesia
3) Parkinsonian Tremors (most likely Parkinson's Disease)
 
Treatment Plan Update:
(1) Start Lithium 150 mg TID
(2) Continue Klonopin 2.5 mg at bedtime
(3) Zyprexa 30 mg at bedtime (as she refused to consider Lithium or Clozapine)
 
 
 
Aspirin 81 MG DAILY 04/18 0900
 
Atorvastatin Calcium 40 MG 1700 04/18 1700
 
Levothyroxine Sodium 0.137 MG DAILY AC 04/18 0700
 
Metformin HCl 500 MG 0800,1700 05/11 0800

## 2018-05-17 NOTE — SOCIAL WORKER PROG NOTE PSYCH
Social Work Progress Note
Progress Note
Asked Mesha how she was doing today?  She said "rapture."  I asked what that 
meant?  She said no more money problems and no more sadness.  This is the way 
she was describing her experience.  She was more hyper Zoroastrian today, talking 
about things from the Bible.  She refused to sign a release for  Care, stating
she is going to be traveling with Jarocho and doesn't need tx because she has been
"cured" by love.  She is continuing to hear voices.  Today reports hearing Jarocho
's voice.  She also is having some delusional references from the TV about her 
and Jarocho traveling [place] countries.  She was reminded of the meeting 
with her Sister Meme tomorrow morning.  She stated she wouldn't be here.  I 
asked where she would be?  She said "Jarocho is coming to get her and she's 
leaving."  She kept saying Dr. Gharaibeh told her she could leave when she has a
roof over her head.  I told her that I didn't think she was ready to leave, due 
to her mood still being elevated and needing more stabilization.  She disagrees.
 Likes feeling "happy."

## 2018-05-18 VITALS — SYSTOLIC BLOOD PRESSURE: 133 MMHG | DIASTOLIC BLOOD PRESSURE: 58 MMHG

## 2018-05-18 VITALS — SYSTOLIC BLOOD PRESSURE: 125 MMHG | DIASTOLIC BLOOD PRESSURE: 94 MMHG

## 2018-05-18 VITALS — SYSTOLIC BLOOD PRESSURE: 122 MMHG | DIASTOLIC BLOOD PRESSURE: 82 MMHG

## 2018-05-18 VITALS — SYSTOLIC BLOOD PRESSURE: 127 MMHG | DIASTOLIC BLOOD PRESSURE: 77 MMHG

## 2018-05-18 NOTE — SOCIAL WORKER PROG NOTE PSYCH
Social Work Progress Note
Progress Note
 
 
 
Determination Status:  **************************** PENDED *********************
******* 
 
 
 
 
The services requested require additional review. You will be contacted 
regarding the status of this request if further information is needed. An 
authorization decision will be made within the required timeframes and details 
of that decision may be found under the member's authorization history.
 
 
 
 
Member Name Member ID Member  Subscriber Name  Subscriber ID 
 
PEYMAN LIVINGSTONIDY [id number] 1955 PEYMAN LIVINGSTONIDY  [id number]
 
    
 
Pended Authorization # Client Authorization # Type of Request  
 
[id number]  CONCURRENT   
 
    
 
Date of Admission/ Start of Services Requested From Submission Date  
 
2018   
 
    
 
Level of Service  Type of Service Level of Care  Type of Care  
 
INPATIENT/HLOC Mental Health Inpatient  Inpatient [facility] - Inpatient Hospital 
 
 
    
 
Reason Code    
 
P76    
 
    
 
Provider Name & Address Provider ID Provider Alternate ID NPI # for 
Authorization 
 
SUZE CANSECO  [id number]   
 
[address] [place] [id number]
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
## 2018-05-18 NOTE — CP SOUTH PROGRESS NOTE PSYCH
Psych (Inpt) Progress Note
Progress Note
The treatment team discussed the patient's progress, treatment plan, and 
aftercare plans. The treatment team included: LCSW, RNs, Group Therapy staff, 
and psychiatrist.
 
Vital Signs:
 
 
Date Time Temp Pulse B/P
 
05/18 1225  98 133/58
 
05/18 0737 97.2 99 125/94
 
05/17 2028 97.0 98 135/68
 
 
I was part of the family meeting that took place with Aruna's sister (also 
present were Mesha, Virgie Jacome LCSW and Social Work intern)
Please see Virgie Jacome LCSW's notes regarding the family meeting.
 
MSE
Mesha was not as talkative today 
Mesha remains delusional with Methodist, grandiose, and sexual themes. 
She continues to have audio hallucinations. She denied suicidal ideation and 
denied homicidal ideation. 
She was alert and oriented to time, place, and person.   
She denied feeling depressed, she was not tearful, denied wishing death and she 
denied thoughts of suicide.  
She denied having violent thoughts or thoughts of homicide. 
 
Assessment: 
Mesha Neri is a 63-year-old  White Female who was admitted on 4/17/
2018 in a state of acute psychosis. 
Mesha has shown imptovement in her coherence/thought process but she ontinues to
have hallucinations and significant delusions as well as some OCD-spectrum 
symptoms and signs
Diagnoses:
1) Schizoaffective Disorder, Bipolar type
2) Tardive Dyskinesia
3) Parkinsonian Tremors (most likely Parkinson's Disease, as 4 of her family 
members have Parkinson's disease and she continues to exhibit the parkinsonian 
tremors even though that she has been on Haldol for about 4 months now)
 
Treatment Plan Update:
(1) Increase/change Lithium to 300 mg twice daily
(2) Reduce Klonopin to 2 mg at bedtime
(3) Reduce PRN Ativan to just 0.5 mg as needed
Zyprexa 30 mg at bedtime (as she refused to consider Lithium or Clozapine)
 
 
Aspirin 81 MG DAILY 04/18 0900
 
Atorvastatin Calcium 40 MG 1700 04/18 1700
 
Levothyroxine Sodium 0.137 MG DAILY AC 04/18 0700
 
Metformin HCl 500 MG 0800,1700 05/11 0800
 
 
DICTATED BY: Gharaibeh MD,Camacho 
 Note:

## 2018-05-19 VITALS — DIASTOLIC BLOOD PRESSURE: 66 MMHG | SYSTOLIC BLOOD PRESSURE: 134 MMHG

## 2018-05-19 VITALS — SYSTOLIC BLOOD PRESSURE: 143 MMHG | DIASTOLIC BLOOD PRESSURE: 86 MMHG

## 2018-05-19 VITALS — DIASTOLIC BLOOD PRESSURE: 90 MMHG | SYSTOLIC BLOOD PRESSURE: 125 MMHG

## 2018-05-19 VITALS — SYSTOLIC BLOOD PRESSURE: 138 MMHG | DIASTOLIC BLOOD PRESSURE: 71 MMHG

## 2018-05-19 NOTE — CP SOUTH PROGRESS NOTE PSYCH
Psych (Inpt) Progress Note
Progress Note
Include the following elements, when applicable:
Involvement in the active treatment of the patient with behavioral observations 
of the patient and the patient's response to the treatment.
Review of the ongoing treatment process in the context of the treatment plan.
Indication of how multi-disciplinary staff members are carrying out the 
treatment plan.
Plans for future interventions and recommendations for revision of the treatment
plan.
Liaison with other physicians/providers.
 
Progress Note:
Pt notes that feels lithium is helpful. No s/s of toxicity. Notes mood is OK 
overall. Denies any SI or HI. Still very tangiental. 
 
 Current Medications
 
 
  Sig/Sharan Start time  Last
 
Medication Dose Route Stop Time Status Admin
 
Acetaminophen 650 MG Q6P PRN 04/17 2200 AC 05/16
 
  PO   2319
 
Al Hydroxide/Mg  30 ML Q4-6 PRN PRN 04/17 2200 AC 
 
Hydroxide  PO   
 
Aspirin 81 MG DAILY 04/18 0900 AC 05/19
 
  PO   0857
 
Atorvastatin Calcium 40 MG 1700 04/18 1700 AC 05/18
 
  PO   1655
 
Benzocaine/Menthol 1 ADRIANA Q4 HRS AS NEEDED PRN 04/26 1200 AC 05/14
 
  PO   1455
 
Clonazepam 2 MG 2000 05/18 2000 AC 05/18
 
  PO 05/25 1959  1957
 
Levothyroxine Sodium 0.137 MG DAILY AC 04/18 0700 AC 05/19
 
  PO   0609
 
Lithium Carbonate 300 MG 0800,2000 05/18 2000 AC 05/19
 
  PO   0857
 
Lithium Carbonate 150 MG TID 05/17 1001 DC 05/18
 
  PO   0749
 
Lorazepam 0.5 MG Q4P PRN 05/18 1100 AC 
 
  PO 05/22 1029  
 
Magnesium Hydroxide 30 ML AT BEDTIME PRN 04/17 2200 AC 04/24
 
  PO   1940
 
Metformin HCl 500 MG 0800,1700 05/11 0800 AC 05/19
 
  PO   0857
 
Multivitamins 1 TAB 0800 05/18 0800 AC 05/19
 
  PO   0857
 
Olanzapine 30 MG 2000 05/16 2000 AC 05/18
 
  PO   1956
 
 
Vital Signs
 
 
Date Time Temp Pulse Resp B/P B/P Pulse O2 O2 Flow FiO2
 
     Mean Ox Delivery Rate 
 
05/19 0734 97.5 106  143/86     
 
05/18 1944 97.9 100  127/77     
 
05/18 1545  118  122/82     
 
05/18 1225  98  133/58     
 
 
 
MSE
General appearance: good hygiene and grooming; 
Attitude: cooperative;
Eye contact: appropriate; 
Movement: no psychomotor agitation or slowing; 
Speech: nl fluency, nl rate/rhythm, nl volume, nl prosody; 
Mood: "fine"
Affect: very flat, appropriate, constricted, non-labile, congruent; 
Thought process: linear and goal-directed, slowed; 
Thought content: denied SI or HI, no paranoid ideation; 
Perception: denied hallucinations- auditory, visual, does not appear to be 
responding to internal stimuli; 
I/J: limited
 
A/P: Pt with SAD now with improved mood slightly. 
- Li level for AM
-Continue current medication regimen
-Encourage integration into the milieu

## 2018-05-20 VITALS — DIASTOLIC BLOOD PRESSURE: 75 MMHG | SYSTOLIC BLOOD PRESSURE: 116 MMHG

## 2018-05-20 VITALS — DIASTOLIC BLOOD PRESSURE: 76 MMHG | SYSTOLIC BLOOD PRESSURE: 124 MMHG

## 2018-05-20 VITALS — SYSTOLIC BLOOD PRESSURE: 125 MMHG | DIASTOLIC BLOOD PRESSURE: 78 MMHG

## 2018-05-20 VITALS — DIASTOLIC BLOOD PRESSURE: 83 MMHG | SYSTOLIC BLOOD PRESSURE: 133 MMHG

## 2018-05-20 NOTE — CP SOUTH PROGRESS NOTE PSYCH
Psych (Inpt) Progress Note
Progress Note
Include the following elements, when applicable:
Involvement in the active treatment of the patient with behavioral observations 
of the patient and the patient's response to the treatment.
Review of the ongoing treatment process in the context of the treatment plan.
Indication of how multi-disciplinary staff members are carrying out the 
treatment plan.
Plans for future interventions and recommendations for revision of the treatment
plan.
Liaison with other physicians/providers.
 
Progress Note:
Pt notes that she slept well. Notes ongoing +AHs "telling me that my  is 
coming." Hopeful that will visit today. Denies SI or HI. 
 
 Current Medications
 
 
  Sig/Sharan Start time  Last
 
Medication Dose Route Stop Time Status Admin
 
Acetaminophen 650 MG Q6P PRN 04/17 2200 AC 05/16
 
  PO   2319
 
Al Hydroxide/Mg  30 ML Q4-6 PRN PRN 04/17 2200 AC 
 
Hydroxide  PO   
 
Aspirin 81 MG DAILY 04/18 0900 AC 05/20
 
  PO   0757
 
Atorvastatin Calcium 40 MG 1700 04/18 1700 AC 05/19
 
  PO   1617
 
Benzocaine/Menthol 1 ADRIANA Q4 HRS AS NEEDED PRN 04/26 1200 AC 05/14
 
  PO   1455
 
Clonazepam 2 MG 2000 05/18 2000 AC 05/19
 
  PO 05/25 1959  1957
 
Levothyroxine Sodium 0.137 MG DAILY AC 04/18 0700 AC 05/20
 
  PO   0624
 
Lithium Carbonate 300 MG 0800,2000 05/18 2000 AC 05/20
 
  PO   1119
 
Lorazepam 0.5 MG Q4P PRN 05/18 1100 AC 05/19
 
  PO 05/22 1029  2322
 
Magnesium Hydroxide 30 ML AT BEDTIME PRN 04/17 2200 AC 04/24
 
  PO   1940
 
Metformin HCl 500 MG 0800,1700 05/11 0800 AC 05/20
 
  PO   0757
 
Multivitamins 1 TAB 0800 05/18 0800 AC 05/20
 
  PO   0757
 
Olanzapine 30 MG 2000 05/16 2000 AC 05/19
 
  PO   1956
 
 
 Laboratory Tests
 
 
 05/20
 
 0655
 
Toxicology 
 
  Lithium (0.6 - 1.2 mmol/L) 0.5  L
 
 
Vital Signs
 
 
Date Time Temp Pulse Resp B/P B/P Pulse O2 O2 Flow FiO2
 
     Mean Ox Delivery Rate 
 
05/20 0745 97.8 100  124/76     
 
05/19 2010 97.9 116  138/71     
 
05/19 1556  98  134/66     
 
05/19 1216  99  125/90     
 
 
 
MSE
General appearance: good hygiene and grooming; 
Attitude: cooperative;
Eye contact: appropriate; 
Movement: no psychomotor agitation or slowing; 
Speech: nl fluency, nl rate/rhythm, nl volume, nl prosody; 
Mood: "fine"
Affect: very flat, appropriate, constricted, non-labile, congruent; 
Thought process: linear and goal-directed, slowed; 
Thought content: denied SI or HI, no paranoid ideation; 
Perception: +AHs of voice telling her that  to visit, does not appear to 
be responding to internal stimuli; 
I/J: limited
 
A/P: Pt with SAD now with improved mood slightly. 
- Li level 0.5
-Continue current medication regimen
-Encourage integration into the milieu

## 2018-05-21 VITALS — SYSTOLIC BLOOD PRESSURE: 129 MMHG | DIASTOLIC BLOOD PRESSURE: 62 MMHG

## 2018-05-21 VITALS — SYSTOLIC BLOOD PRESSURE: 127 MMHG | DIASTOLIC BLOOD PRESSURE: 73 MMHG

## 2018-05-21 VITALS — DIASTOLIC BLOOD PRESSURE: 76 MMHG | SYSTOLIC BLOOD PRESSURE: 118 MMHG

## 2018-05-21 VITALS — SYSTOLIC BLOOD PRESSURE: 111 MMHG | DIASTOLIC BLOOD PRESSURE: 75 MMHG

## 2018-05-21 NOTE — CP SOUTH PROGRESS NOTE PSYCH
Psych (Inpt) Progress Note
Progress Note
I reviewed Dr. Alex Maynard MD's notes from Sat. and Sunday (5/19 and 5
/20, 2018).
 
The treatment team discussed the patient's progress, treatment plan, and 
aftercare plans. The treatment team included: LCSW, RNs, Group Therapy staff, 
and psychiatrist.
 
SARAH Zimmer was alert and oriented to time, place, and person. She remains talkative 
with delusional with Samaritan, grandiose, and sexual themes. 
She continues to have audio hallucinations. She denied suicidal ideation and 
denied homicidal ideation. 
She denied feeling depressed, she was not tearful, denied wishing death and she 
denied thoughts of suicide.  She denied having violent thoughts or thoughts of 
homicide. 
 
Assessment: 
Mesha Neri is a 63-year-old  White Female who was admitted on 4/17/
2018 in a state of acute psychosis. 
Mesha has shown imptovement in her coherence/thought process but she ontinues to
have hallucinations and significant delusions as well as some OCD-spectrum 
symptoms and signs
Diagnoses:
1) Schizoaffective Disorder, Bipolar type
2) Tardive Dyskinesia
3) Parkinsonian Tremors (most likely Parkinson's Disease, as 4 of her family 
members have Parkinson's disease and she continues to exhibit the parkinsonian 
tremors even though that she has been on Haldol for about 4 months now)
 
Treatment Plan Update:
(1) Continue Lithium 300 mg twice daily
(2) Reduce Klonopin to 1.5 mg at bedtime
(3) Reduce Zyprexa to 20 mg at bedtime
 
2) Tardive Dyskinesia
3) Parkinsonian Tremors (most likely Parkinson's Disease, as 4 of her family 
members have Parkinson's disease and she continues to exhibit the parkinsonian 
tremors even though that she has been on Haldol for about 4 months now)
 
Treatment Plan Update:
(1) Increase/change Lithium to 300 mg twice daily
(2) Reduce Klonopin to 2 mg at bedtime
(3) Reduce PRN Ativan to just 0.5 mg as needed
Zyprexa 30 mg at bedtime (as she refused to consider Lithium or Clozapine)

## 2018-05-21 NOTE — SOCIAL WORKER PROG NOTE PSYCH
Social Work Progress Note
Progress Note
Determination Status:
****************************     PENDED     ****************************
The services requested require additional review. You will be contacted 
regarding the status of this request if further information is needed. An 
authorization decision will be made within the required timeframes and details 
of that decision may be found under the member's authorization history.
Member Name
Member ID
Member 
Subscriber Name
Subscriber ID
PEYMAN LIVINGSTONIDY
[id number]
1955
PEYMAN LIVINGSTONIDY 
[id number]
 
Pended Authorization #
Client Authorization #
Type of Request
 
[id number]
[id number]
CONCURRENT
 
 
Date of Admission/ Start of Services
Requested From
Submission Date
   
2018
   
 
Level of Service
Type of Service
Level of Care
Type of Care
 
INPATIENT/HLOC
MENTAL HEALTH
INPATIENT
INPATIENT HOSPITAL - INPATIENT HOSPITAL
 
 
Reason Code
 
P76
 
 
Provider Name & Address
Provider ID
Provider Alternate ID
NPI # for Authorization
GAIL JENSEN
[address] [place] [id number]
[id number]
[id number]
N/A

## 2018-05-21 NOTE — SOCIAL WORKER PROG NOTE PSYCH
Social Work Progress Note
Progress Note
Mesha was upset this morning over losing some clothes.  Particularly a shirt and
a pair of pants.  She reported meeting with the adelfo over the weekend and 
thought maybe she left them in the group room.  Reports visits with her Sister 
Meme over the weekend and talking to Jarocho a couple of times.  She told me today
she was having trouble walking and that she thinks it's because she is connected
to or "hooked up to" someone else that can't walk.  She thinks this is happening
due to a voice she is hearing.  She also referenced feeling special as God has 
chosen her to help others.  She reported that people have been lame and unable 
to walk and she has helped them.  Tried to challenge her a bit on this, as she 
usually may be able to understand this is part of her illness.  But today has 
limited insight into those symptoms.  She understands that the hospital is going
to file for conservatorship.  She is okay with the family  Sy Rubin 
being appointed.  I also let her know I would be following up with [facility] about
her referral.  Her mood was a little less elevated today than when I saw her 
last week, but still presenting with significant delusions of grandiosity. 
 
Printed Conservatorship paperwork to be filled out.  Shared information to be 
done and completed by Dr. Gharaibeh.

## 2018-05-22 VITALS — DIASTOLIC BLOOD PRESSURE: 76 MMHG | SYSTOLIC BLOOD PRESSURE: 120 MMHG

## 2018-05-22 VITALS — SYSTOLIC BLOOD PRESSURE: 111 MMHG | DIASTOLIC BLOOD PRESSURE: 62 MMHG

## 2018-05-22 VITALS — SYSTOLIC BLOOD PRESSURE: 120 MMHG | DIASTOLIC BLOOD PRESSURE: 74 MMHG

## 2018-05-22 VITALS — DIASTOLIC BLOOD PRESSURE: 68 MMHG | SYSTOLIC BLOOD PRESSURE: 135 MMHG

## 2018-05-22 NOTE — CP SOUTH PROGRESS NOTE PSYCH
**See Addendum**
Psych (Inpt) Progress Note
Progress Note
The treatment team discussed the patient's progress, treatment plan, and 
aftercare plans. The treatment team included: LCSW, RNs, Group Therapy staff, 
and psychiatrist.
 
Mental status examination:
Mesha was less talkative today. She denied feeling unsteady or groggy. She was 
alert and oriented to time, place, and person. She remains delusional but she 
was less pre-occupied with delusions today.
She continues to have audio hallucinations. She denied suicidal ideation and 
denied homicidal ideation. She denied feeling depressed, she was not tearful, 
denied wishing death and she denied thoughts of suicide.  She denied having 
violent thoughts or thoughts of homicide. 
 
Assessment: 
Mesha Neri is a 63-year-old  White Female who was admitted on 4/17/
2018 in a state of acute psychosis. 
Mesha ontinues to have hallucinations and delusions but has been free of 
thoughts of suicide and free of thoughts of violence
 
Diagnoses:
1) Schizoaffective Disorder, Bipolar type
2) Tardive Dyskinesia
3) Parkinsonian Tremors (most likely Parkinson's Disease, as 4 of her family 
members have Parkinson's disease and she continues to exhibit the parkinsonian 
tremors even though that she has been on Haldol for about 4 months now)
 
Treatment Plan Update:
Same medications
(1) Continue Lithium 300 mg twice daily
(2) Klonopin 1.5 mg at bedtime
(3) Zyprexa 20 mg at bedtime

## 2018-05-23 VITALS — SYSTOLIC BLOOD PRESSURE: 136 MMHG | DIASTOLIC BLOOD PRESSURE: 61 MMHG

## 2018-05-23 VITALS — DIASTOLIC BLOOD PRESSURE: 68 MMHG | SYSTOLIC BLOOD PRESSURE: 128 MMHG

## 2018-05-23 VITALS — DIASTOLIC BLOOD PRESSURE: 55 MMHG | SYSTOLIC BLOOD PRESSURE: 116 MMHG

## 2018-05-23 VITALS — SYSTOLIC BLOOD PRESSURE: 124 MMHG | DIASTOLIC BLOOD PRESSURE: 73 MMHG

## 2018-05-23 NOTE — CP SOUTH PROGRESS NOTE PSYCH
Psych (Inpt) Progress Note
Progress Note
The treatment team discussed Pt's progress, treatment plan, and aftercare plans.
The treatment team included: LCSWs, RNs, Group Therapy staff, and psychiatrist.
 
Mental status examination:
Mesha was less talkative and less delusional today. She does not like lithium 
and came up with several complaints about it (reported it is causing her a lot 
of drooling). She was alert and oriented to time, place, and person. She does 
not talk as much about audio hallucinations. She denied suicidal ideation and 
denied homicidal ideation. She denied feeling depressed, she was not tearful, 
denied wishing death and she denied thoughts of suicide. She denied having 
violent thoughts or thoughts of homicide. 
 
Assessment: 
Mesha Neri is a 63-year-old  White Female who was admitted on 4/17/
2018 in a state of acute psychosis. Mesha ontinues to have hallucinations and 
delusions but has been free of thoughts of suicide and free of thoughts of 
violence
 
Diagnoses (Last updated 5/23/2018):
1) Schizoaffective Disorder, Bipolar type
2) Tardive Dyskinesia
3) Parkinson's Disease
 
Treatment Plan Update:
(1) Reduce Lithium back to 300 mg twice daily
(2) Reduce Klonopin to 1 mg at bedtime
(3) Continue Zyprexa 20 mg at bedtime
 
 
(2) Klonopin 1.5 mg at bedtime
(3) Zyprexa 20 mg at bedtime

## 2018-05-23 NOTE — SOCIAL WORKER PROG NOTE PSYCH
Social Work Progress Note
Progress Note
TC - Meme Ph#[phone] (Mesha's Sister) - asked for her brother Lonny Tucker's phone number. Lonny Tucker work number is Ph#[phone]. Meme 
said it's Ok to have him paged, apparently he owns his own business. Meme stated
she thinks the 's name is Sy Rubin - matthew isn't sure and asked for us
to contact Lonny to confirm.

## 2018-05-23 NOTE — SOCIAL WORKER PROG NOTE PSYCH
Social Work Progress Note
Progress Note
Met with Mesha today. She was in bed sleeping at 11:30am.  She stated that Jarocho
was on [place] and visited her and just left. I told her that he was not here on 
[place].  She stated "Well in my mind he was." She stated she is "tired."  
Encouraged her to get up and stay out of bed - go to groups.  She did so - got 
up.  She stated the "lithium is making me drool."  Discussed how she may have 
been drooling when asleep. She stated she is going to love with Jarocho. Later in 
discussion she stated her brother, Lonny Tucker will have her live with him as
long as she "goes away for a year."  Mesha signed an BJ for her brother. She 
stated her sister, Meme has his phone number.  Mesha approached Virgie Jacome LCSW 
and asked "where am I going after I leave here - do I have a place to stay."  
Mesha was informed not now, we are working on this - and need to activate her 
insurance - ask for assitance from her conservator - once appointed. 
 
Mesha stated she didn't think she had any problems prior to admission, she 
stated 'the problem is here, that I am here."  She stated "I wish Jarocho killed 
me."

## 2018-05-24 VITALS — SYSTOLIC BLOOD PRESSURE: 135 MMHG | DIASTOLIC BLOOD PRESSURE: 68 MMHG

## 2018-05-24 VITALS — DIASTOLIC BLOOD PRESSURE: 74 MMHG | SYSTOLIC BLOOD PRESSURE: 140 MMHG

## 2018-05-24 VITALS — DIASTOLIC BLOOD PRESSURE: 69 MMHG | SYSTOLIC BLOOD PRESSURE: 138 MMHG

## 2018-05-24 VITALS — DIASTOLIC BLOOD PRESSURE: 65 MMHG | SYSTOLIC BLOOD PRESSURE: 135 MMHG

## 2018-05-24 NOTE — CP SOUTH PROGRESS NOTE PSYCH
Psych (Inpt) Progress Note
Progress Note
treatment team discussed Pt's progress, treatment plan, and aftercare plans. The
treatment team included: LCSWs, RNs, Group Therapy staff, and psychiatrist.
 
Mental status examination:
Mesha was not talkative today and was less delusional. She was alert and 
oriented to time, place, and person. She does not talk as much about audio 
hallucinations. She denied suicidal ideation and denied homicidal ideation. She 
denied feeling depressed, she was not tearful, denied wishing death and she 
denied thoughts of suicide. She denied having violent thoughts or thoughts of 
homicide. 
 
Assessment: 
Mesha Neri is a 63-year-old  White Female who was admitted on 4/17/
2018 in a state of acute psychosis. Mesha ontinues to have hallucinations and 
delusions but has been free of thoughts of suicide and free of thoughts of 
violence
 
Diagnoses (Last updated 5/23/2018):
1) Schizoaffective Disorder, Bipolar type
2) Tardive Dyskinesia
3) Parkinson's Disease
 
Treatment Plan Update
Lithium 300 mg twice daily
Klonopin 1 mg at bedtime
(3) Continue Zyprexa 20 mg at bedtime

## 2018-05-24 NOTE — SOCIAL WORKER PROG NOTE PSYCH
Social Work Progress Note
Progress Note
Determination Status:
****************************     PENDED     ****************************
The services requested require additional review. You will be contacted 
regarding the status of this request if further information is needed. An 
authorization decision will be made within the required timeframes and details 
of that decision may be found under the member's authorization history.
Member Name
Member ID
Member 
Subscriber Name
Subscriber ID
PEYMAN LIVINGSTONIDY
[id number]
1955
PEYMAN LIVINGSTONIDY 
[id number]
 
Pended Authorization #
Client Authorization #
Type of Request
 
[id number]
[id number]
CONCURRENT
 
 
Date of Admission/ Start of Services
Requested From
Submission Date
   
2018
   
 
Level of Service
Type of Service
Level of Care
Type of Care
 
INPATIENT/HLOC
MENTAL HEALTH
INPATIENT
INPATIENT HOSPITAL - INPATIENT HOSPITAL
 
 
Reason Code
 
P76
 
 
Provider Name & Address
Provider ID
Provider Alternate ID
NPI # for Authorization
GAIL JENSEN
[address] [place] [id number]
[id number]
[id number]
N/A
 
Message
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
## 2018-05-25 VITALS — DIASTOLIC BLOOD PRESSURE: 68 MMHG | SYSTOLIC BLOOD PRESSURE: 137 MMHG

## 2018-05-25 VITALS — SYSTOLIC BLOOD PRESSURE: 132 MMHG | DIASTOLIC BLOOD PRESSURE: 58 MMHG

## 2018-05-25 VITALS — SYSTOLIC BLOOD PRESSURE: 135 MMHG | DIASTOLIC BLOOD PRESSURE: 63 MMHG

## 2018-05-25 NOTE — CP SOUTH PROGRESS NOTE PSYCH
Psych (Inpt) Progress Note
Progress Note
Treatment team discussed Pt's progress, treatment plan, and aftercare plans. The
treatment team included: LCSWs, RNs, Group Therapy staff, and psychiatrist.
 
Vital Signs
 
 
Date Temp Pulse B/P
 
05/25 96.7 95 135/63
 
05/24 98.4 92 138/69
 
Mental Status Examination:
Mesha was less talkative today, she was not pressured. She was less delusional. 
She was alert and oriented to time, place, and person. Her audio hallucinations 
seem to have declined. She denied suicidal ideation and denied violent thoughts/
homicidal ideation. She denied feeling depressed, she was not tearful. 
She showed good attention, concentration and information processing today. There
was no evidence of short-term memory impairment.
 
Assessment: 
Mesha Neri is a 63-year-old  White Female who was admitted on 4/17/
2018 in a state of acute psychosis. Mesha ontinues to have hallucinations and 
delusions but has been free of thoughts of suicide and free of thoughts of 
violence
 
Diagnoses (Last updated 5/23/2018):
1) Schizoaffective Disorder, Bipolar type
2) Tardive Dyskinesia
3) Parkinson's Disease
 
Treatment Plan Update:
Continue Lithium 300 mg twice daily
Continue Klonopin 1 mg at bedtime
Continue Zyprexa 20 mg at bedtime

## 2018-05-25 NOTE — SOCIAL WORKER PROG NOTE PSYCH
Social Work Progress Note
Progress Note
Mesha was in group this morning and then went to lay down.  She got up when 
prompted to talk.  She sat with this writer and Naun Cha (MSW student).  
She reported not sleeping that well due to restless/ achy legs last night.  She 
continues to hear voices.  The voice is typically Jarocho's voice.  She talks 
about the "3 lines" she has that channel his voice.  One line tells her how much
he loves her, another line is typically a negative line that causes her hands to
shake, and the last one she typically hears that Jarocho and Renee are together/ 
.  Jolie went on to talk about how much Jarocho loves her and she loves him. 
She only wants to live with him.  She got angry with her Sister Meme this 
morning, because Meme is telling her she doesn't want her with Jarocho.  This is 
causing a conflict between her and Meme.  She is bad mouthing her Sister.  I 
tried to explain she is only trying to watch out for her.  
[facility] staff would like to meet with her on the unit to engage.  Mesha is open 
to meeting with them.  Called Gogo at [facility] to arrange a meeting.  Left a 
message.  I was informed earlier by Gogo that there are no openings at their 
respite program and no openings in their supervised housing program.

## 2018-05-26 VITALS — DIASTOLIC BLOOD PRESSURE: 55 MMHG | SYSTOLIC BLOOD PRESSURE: 110 MMHG

## 2018-05-26 VITALS — DIASTOLIC BLOOD PRESSURE: 73 MMHG | SYSTOLIC BLOOD PRESSURE: 138 MMHG

## 2018-05-26 VITALS — SYSTOLIC BLOOD PRESSURE: 117 MMHG | DIASTOLIC BLOOD PRESSURE: 61 MMHG

## 2018-05-26 VITALS — SYSTOLIC BLOOD PRESSURE: 126 MMHG | DIASTOLIC BLOOD PRESSURE: 58 MMHG

## 2018-05-26 NOTE — CP SOUTH PROGRESS NOTE PSYCH
Psych (Inpt) Progress Note
Progress Note
Include the following elements, when applicable:
Involvement in the active treatment of the patient with behavioral observations 
of the patient and the patient's response to the treatment.
Review of the ongoing treatment process in the context of the treatment plan.
Indication of how multi-disciplinary staff members are carrying out the 
treatment plan.
Plans for future interventions and recommendations for revision of the treatment
plan.
Liaison with other physicians/providers.
Progress Note:
I hear Jarocho's voice saying that he can't love me more than he does now.The 
patient's discourse is disorganized.  She is talking about the huge amounts of 
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
She continues to be paranoid and suspicious.
She is asking repeatedly "how do you think I am doing "
The patient denies suicidal/homicidal ideation, auditory/visual hallucinations 
or side effects from medication.
We will continue present medication regimen, observation, symptom monitoring.  
The patient will be followed up daily by the unit psychiatrist.

## 2018-05-27 VITALS — SYSTOLIC BLOOD PRESSURE: 111 MMHG | DIASTOLIC BLOOD PRESSURE: 50 MMHG

## 2018-05-27 VITALS — DIASTOLIC BLOOD PRESSURE: 52 MMHG | SYSTOLIC BLOOD PRESSURE: 136 MMHG

## 2018-05-27 VITALS — DIASTOLIC BLOOD PRESSURE: 71 MMHG | SYSTOLIC BLOOD PRESSURE: 123 MMHG

## 2018-05-27 VITALS — SYSTOLIC BLOOD PRESSURE: 132 MMHG | DIASTOLIC BLOOD PRESSURE: 67 MMHG

## 2018-05-27 VITALS — DIASTOLIC BLOOD PRESSURE: 67 MMHG | SYSTOLIC BLOOD PRESSURE: 128 MMHG

## 2018-05-27 LAB — LITHIUM SERPL-SCNC: 1 MMOL/L (ref 0.6–1.2)
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
## 2018-05-27 NOTE — CP SOUTH PROGRESS NOTE PSYCH
Psych (Inpt) Progress Note
Progress Note
Include the following elements, when applicable:
Involvement in the active treatment of the patient with behavioral observations 
of the patient and the patient's response to the treatment.
Review of the ongoing treatment process in the context of the treatment plan.
Indication of how multi-disciplinary staff members are carrying out the 
treatment plan.
Plans for future interventions and recommendations for revision of the treatment
plan.
Liaison with other physicians/providers.
Progress Note:
Amitryptiline 50 mg at bedtime

## 2018-05-28 VITALS — SYSTOLIC BLOOD PRESSURE: 137 MMHG | DIASTOLIC BLOOD PRESSURE: 64 MMHG

## 2018-05-28 VITALS — DIASTOLIC BLOOD PRESSURE: 71 MMHG | SYSTOLIC BLOOD PRESSURE: 134 MMHG

## 2018-05-28 VITALS — DIASTOLIC BLOOD PRESSURE: 56 MMHG | SYSTOLIC BLOOD PRESSURE: 133 MMHG

## 2018-05-28 VITALS — DIASTOLIC BLOOD PRESSURE: 64 MMHG | SYSTOLIC BLOOD PRESSURE: 129 MMHG

## 2018-05-28 NOTE — CP SOUTH PROGRESS NOTE PSYCH
Psych (Inpt) Progress Note
Progress Note
Include the following elements, when applicable:
Involvement in the active treatment of the patient with behavioral observations 
of the patient and the patient's response to the treatment.
Review of the ongoing treatment process in the context of the treatment plan.
Indication of how multi-disciplinary staff members are carrying out the 
treatment plan.
Plans for future interventions and recommendations for revision of the treatment
plan.
Liaison with other physicians/providers.
Progress Note:
 
Subjective:   Patient concerned about is her conservator patient concerned that 
conservator might prohibit her from seeing her ex-, Jarocho.  Very fixated 
on Jarocho's whereabouts and multiple calls from Jarocho today.  Also very upset 
that Jarocho "hates me."  Patient denies SI/HI/AVH/SIB but continues to express 
paranoia.  Patient states she does not like lithium and feels that it may be 
causing morning stiffness.  Patient denies other complaints.  Patient states her
tremor appears to be at baseline.  Patient eating well but continuing to have 
disrupted sleep at night.  Patient feels that olanzapine has been helpful
.
Objective:    Patient was adherent with medications and in behavioral control.  
Per nursing, patient awake a few times and early morning, redirected back to 
sleep.  VSS.  
 
 Current Medications
 
 
  Sig/Sharan Start time  Last
 
Medication Dose Route Stop Time Status Admin
 
Acetaminophen 650 MG .STK-MED ONE 05/27 2214 DC 
 
  PO 05/27 2215  
 
Acetaminophen 650 MG Q6P PRN 04/17 2200 AC 05/27
 
  PO   2215
 
Al Hydroxide/Mg  30 ML Q4-6 PRN PRN 04/17 2200 AC 
 
Hydroxide  PO   
 
Aspirin 81 MG DAILY 04/18 0900 AC 05/27
 
  PO   0830
 
Atorvastatin Calcium 40 MG 1700 04/18 1700 AC 05/27
 
  PO   1700
 
Benzocaine/Menthol 1 ADRIANA Q4 HRS AS NEEDED PRN 04/26 1200 AC 05/14
 
  PO   1455
 
Clonazepam 1 MG 2000 05/23 2000 AC 05/27
 
  PO 05/30 1959  2006
 
Levothyroxine Sodium 0.137 MG DAILY AC 04/18 0700 AC 05/28
 
  PO   0543
 
Lithium Carbonate 300 MG 0800,2000 05/23 2000 AC 05/27
 
  PO   2006
 
Magnesium Hydroxide 30 ML AT BEDTIME PRN 04/17 2200 AC 04/24
 
  PO   1940
 
Metformin HCl 500 MG 0800,1700 05/11 0800 AC 05/27
 
  PO   1700
 
Multivitamins 1 TAB 0800 05/18 0800 AC 05/27
 
  PO   0830
 
Olanzapine 20 MG 2000 05/21 2000 AC 05/27
 
  PO   2007
 
 
 Laboratory Tests
 
 
 
05/27/18 0600:
Anion Gap 9, Estimated GFR > 60, BUN/Creatinine Ratio 17.8, TSH &T3 &Free T4 
Intrp 0.999, Lithium 1.0
 
05/27/18 0550:
Urine Color YEL, Urine Clarity CLEAR, Urine pH 7.0, Ur Specific Gravity 1.010, 
Urine Protein NEG, Urine Ketones NEG, Urine Nitrite NEG, Urine Bilirubin NEG, 
Urine Urobilinogen 0.2, Ur Leukocyte Esterase NEG, Ur Microscopic EXAM NOT 
REQUIRED, Urine Hemoglobin NEG, Urine Glucose NEG
Vital Signs
 
 
Date Time Temp Pulse Resp B/P B/P Pulse O2 O2 Flow FiO2
 
     Mean Ox Delivery Rate 
 
05/28 0747 98.0 100  134/71     
 
05/27 1943 99.4 84  132/67     
 
05/27 1555  82  111/50     
 
05/27 1549  96  128/67     
 
05/27 1228  100  136/52     
 
 
 
 
MSE:
General: Patient alert and oriented, unkempt, intense eye contact, no apparent 
distress.  
Speech: Moderate rate and volume, halting prosody. 
Motor: Significant bilateral UE tremor
Mood: "Will let me tell you about Jarocho, we got  in 1988."  
Affect:  [place], euthymic, mood congruent, mildly constricted, non-labile, poorly
 related.
Thought process: Tangential
Thought content: No SI/HI/AVH/SIB, appears to be paranoid and somewhat 
delusional, ruminating on Jarocho.
Cognition: Clear deficits in memory, attention, concentration.
Insight: Poor
Judgment: Poor-Fair, able to maintain behavioral control on unit
 
A&P:  63-year-old white female with schizoaffective disorder with acute manic 
and psychotic decompensation, tardive dyskinesia and Parkinson's disease, 
presenting quite disorganized with SI and CAH, placed on PEC.  Lithium 1.0.  
Medically complicated with peripheral neuropathy, hypertension, hyperlipidemia 
and hypothyroidism.  At this time, patient continues to be paranoid and somewhat
delusional fixated on her ex- and concerned about a new conservator, 
however med adherence and in behavioral control.  Unclear how close she is to 
her baseline at this point.  
 
- Maintain safety, every 15 minute checks
-Continue medications as above
-Continue plan

## 2018-05-29 VITALS — SYSTOLIC BLOOD PRESSURE: 125 MMHG | DIASTOLIC BLOOD PRESSURE: 69 MMHG

## 2018-05-29 VITALS — DIASTOLIC BLOOD PRESSURE: 57 MMHG | SYSTOLIC BLOOD PRESSURE: 131 MMHG

## 2018-05-29 VITALS — SYSTOLIC BLOOD PRESSURE: 133 MMHG | DIASTOLIC BLOOD PRESSURE: 61 MMHG

## 2018-05-29 VITALS — DIASTOLIC BLOOD PRESSURE: 61 MMHG | SYSTOLIC BLOOD PRESSURE: 130 MMHG

## 2018-05-29 NOTE — SOCIAL WORKER PROG NOTE PSYCH
Social Work Progress Note
Progress Note
Determination Status:
****************************     PENDED     ****************************
The services requested require additional review. You will be contacted 
regarding the status of this request if further information is needed. An 
authorization decision will be made within the required timeframes and details 
of that decision may be found under the member's authorization history.
Member Name
Member ID
Member 
Subscriber Name
Subscriber ID
PEYMAN LIVINGSTONIDY
[id number]
1955
PEYMAN LIVINGSTONIDY 
[id number]
 
Pended Authorization #
Client Authorization #
Type of Request
 
[id number]
[id number]
CONCURRENT
 
 
Date of Admission/ Start of Services
Requested From
Submission Date
   
2018

## 2018-05-29 NOTE — CP SOUTH PROGRESS NOTE PSYCH
Psych (Inpt) Progress Note
Progress Note
Treatment team discussed Pt's progress, treatment plan, and aftercare plans. The
treatment team included: LCSWs, RNs, Group Therapy staff, and psychiatrist.
 
Mental Status Examination:
Mesha was less talkative today, not pressured, and no delusions voiced,
She was alert and oriented to time, place, and person. Her audio hallucinations 
seem to have declined. She denied suicidal ideation and denied violent thoughts/
homicidal ideation. She denied feeling depressed, she was not tearful. 
She showed good attention, concentration and information processing today. There
was no evidence of short-term memory impairment.
 
Assessment: 
Mesha Neri is a 63-year-old  White Female who was admitted on 4/17/
2018 in a state of acute psychosis. Mesha ontinues to have hallucinations and 
delusions but has been free of thoughts of suicide and free of thoughts of 
violence.
I spoke with Ford Neri (at the [facility], [phone], room 17)
, he said he has the room for 3 more weeks while he is looking for an apartment,
and that he would not joesph Mesha staying with him and they may look for an 
apartment together
 
Diagnoses (Last updated 5/23/2018):
1) Schizoaffective Disorder, Bipolar type
2) Tardive Dyskinesia
3) Parkinson's Disease
 
Treatment Plan Update:
Continue Lithium 300 mg twice daily
Continue Klonopin 1 mg at bedtime
Continue Zyprexa 20 mg at bedtime
 
Treatment Plan Update:
Continue Lithium 300 mg twice daily
Continue Klonopin 1 mg at bedtime
Continue Zyprexa 20 mg at bedtime

## 2018-05-29 NOTE — SOCIAL WORKER PROG NOTE PSYCH
Social Work Progress Note
Progress Note
Called [place] [address] to see if the hearing has been scheduled for 
conservatorship.  Bonnie Srivastava () told me it has not been scheduled yet.  
Talked to Gogo at [facility].  Elena (crisis clinician) will be coming in to meet
with Mesha on Thursday at 2pm. Called Jennifer Neri (daughter) and left a message
asking about temporary placement with her and in regards to the conservatorship 
hearing. 
Mesha asked me if we could call Jarocho.  I told her that we could.  She reached 
him at the [facility] where he is currently staying.  Jarocho said he was
currently staying there until he could find an apartment.  He said he would be 
looking for something in the area for around 800.00 a month.  He is fine with 
Mesha staying with him as long as she wants that.  He reports not being able to 
see Mesha at the hospital right now, due to not having a car and not wanting to 
pay taxi fare.  Mesha is hoping to leave the hospital asa to go be with Jarocho. 
I told her that I was not making promises and that I needed to discuss the plan 
with the team.  I let Jarocho know that I was also trying to connect Mesha to [facility] for services.  Mesha didn't seem to focus much on that part of her 
aftercare plan.  I told her that [facility] staff is looking to meet with her on 
Thursday.  She is hoping to go Friday.  She is very giddy about the idea of 
seeing Jarocho.

## 2018-05-30 VITALS — SYSTOLIC BLOOD PRESSURE: 124 MMHG | DIASTOLIC BLOOD PRESSURE: 67 MMHG

## 2018-05-30 VITALS — DIASTOLIC BLOOD PRESSURE: 63 MMHG | SYSTOLIC BLOOD PRESSURE: 134 MMHG

## 2018-05-30 VITALS — DIASTOLIC BLOOD PRESSURE: 63 MMHG | SYSTOLIC BLOOD PRESSURE: 114 MMHG

## 2018-05-30 VITALS — SYSTOLIC BLOOD PRESSURE: 132 MMHG | DIASTOLIC BLOOD PRESSURE: 68 MMHG

## 2018-05-30 NOTE — SOCIAL WORKER PROG NOTE PSYCH
Social Work Progress Note
Progress Note
Mesha ate breakfast, slept well.  She is excited to see Elena from [facility] 
tomorrow.  She remembers her from working with her here on [facility] years ago.  She 
remains excited about the idea of seeing Jarocho soon.  I told her I have not 
discussed her plan with the team and will need to do so today.  She has some 
complaints about the Lithium causing her to feel a little unsteady on her feet. 
I asked if she was going to remain on her medication?  She said "I guess I have 
to."  I told her that if tolerating some of the shakiness means helping her 
remain out of the hospital and helps her mood stabilize then it may be worth it.
 She seemed to be thinking about it.  Her goal for the day is to "remain 
positive."  She reports voices, but they are positive in nature. 
 Discussed d/c for Friday with the team to [place].

## 2018-05-30 NOTE — CP SOUTH PROGRESS NOTE PSYCH
Psych (Inpt) Progress Note
Progress Note
Treatment team discussed Pt's progress, treatment plan, and aftercare plans. The
treatment team included: LCSWs, RNs, Group Therapy staff, and psychiatrist.
Vital Signs
 
 
Date Time Temp Pulse B/P B/P Pulse O2 O2 Flow FiO2
 
05/30 1205  99 134/63     
 
05/30 0746 97.4 98 114/63     
 
05/29 1951 98.8 96 133/61     
 
05/29 1609  96 130/61     
 
Mental Status Examination:
Mesha was alert and oriented to time, place, and person. She was less talkative 
today, not pressured, and no delusions voiced. She reported that the voices are 
becoming "all good" (i.e. no more bad voices telling her she deserves to be 
punished or deserves to die, etc.)
She denied suicidal ideation and denied violent thoughts/homicidal ideation. She
denied feeling depressed, she was not tearful. She showed good attention, 
concentration and information processing today. There was no evidence of short-
term memory impairment.
 
Assessment: 
Mesha Neri is a 63-year-old  White Female who was admitted on 4/17/
2018 in a state of acute psychosis. 
Significant improvement since admission
 
Diagnoses (Last updated 5/23/2018):
1) Schizoaffective Disorder, Bipolar type
2) Tardive Dyskinesia
3) Parkinson's Disease
 
Treatment Plan Update:
Reduce Klonopin to 0.5 mg at bedtime
Reduce Lithium to 150 mg in AM and 300 mg at bedtime
Continue Zyprexa 20 mg at bedtime
 
 
Acetaminophen 650 MG Q6P PRN 04/17 2200 AC 05/27
 
Al Hydroxide/Mg  30 ML Q4-6 PRN PRN 04/17 2200 AC 
 
Aspirin 81 MG DAILY 04/18 0900 AC 05/30
 
Atorvastatin Calcium 40 MG 1700 04/18 1700 AC 05/29
 
Benzocaine/Menthol 1 ADRIANA Q4 HRS AS NEEDED PRN 04/26 1200 AC 05/14
 
Clonazepam 0.5 MG 2000 05/30 2000 AC 
 
Levothyroxine Sodium 0.137 MG DAILY AC 04/18 0700 AC 05/30
 
Magnesium Hydroxide 30 ML AT BEDTIME PRN 04/17 2200 AC 04/24
 
Metformin HCl 500 MG 0800,1700 05/11 0800 AC 05/30
 
Multivitamins 1 TAB 0800 05/18 0800 AC 05/30

## 2018-05-31 VITALS — DIASTOLIC BLOOD PRESSURE: 71 MMHG | SYSTOLIC BLOOD PRESSURE: 135 MMHG

## 2018-05-31 VITALS — DIASTOLIC BLOOD PRESSURE: 70 MMHG | SYSTOLIC BLOOD PRESSURE: 123 MMHG

## 2018-05-31 VITALS — SYSTOLIC BLOOD PRESSURE: 124 MMHG | DIASTOLIC BLOOD PRESSURE: 75 MMHG

## 2018-05-31 VITALS — SYSTOLIC BLOOD PRESSURE: 121 MMHG | DIASTOLIC BLOOD PRESSURE: 87 MMHG

## 2018-05-31 NOTE — SOCIAL WORKER PROG NOTE PSYCH
Social Work Progress Note
Progress Note
 
Determination Status:
****************************     PENDED     ****************************
The services requested require additional review. You will be contacted 
regarding the status of this request if further information is needed. An 
authorization decision will be made within the required timeframes and details 
of that decision may be found under the member's authorization history.
Member Name
Member ID
Member 
Subscriber Name
Subscriber ID
PEYMAN LIVINGSTONIDY
[id number]
1955
PEYMAN LIVINGSTONIDY 
[id number]
 
Pended Authorization #
Client Authorization #
Type of Request
 
[id number]
[id number]
CONCURRENT
 
 
Date of Admission/ Start of Services
Requested From
Submission Date
   
2018

## 2018-05-31 NOTE — SOCIAL WORKER PROG NOTE PSYCH
**See Addendum**
Social Work Progress Note
Progress Note
Spoke with Gogo from [facility].  Mesha has an intake on 6/5 at 2pm.  She will 
be seen within a couple of weeks for a med eval.  
Met with Mesha and Elena OVALLES(crisis clinician from [facility]).  Elena reviewed 
services with Mesha.  Mesha talked a little about what brought her in.   We 
talked about her presentation throughout her hospital stay.  Mesha seems 
interested in engaging in their services.  She was happy to see a familiar face,
due to knowing Elena from the past.  Informed Elena that she will be staying in 
[place] temporarily until they find an apartment elsewhere.  She is also aware
that Mesha has limitations around transportation right now.  Elena said once Jolie 
comes in they will get her connected to case management as soon as possible and 
cm can then help with rides.  They will also help her get reinstated with Hawk Zimmer's focus througout the conversation was on Jarocho and their 
relationship.

## 2018-05-31 NOTE — CP SOUTH PROGRESS NOTE PSYCH
Psych (Inpt) Progress Note
Progress Note
Treatment team discussed Pt's progress, treatment plan, and aftercare plans. The
treatment team included: LCSWs, RNs, Group Therapy staff, and psychiatrist.
 
Mental Status Examination:
Mesha was alert and oriented to time, place, and person. 
She was not talkative today & was not pressured. There were no delusions during 
the 10 minutes I spent with Mesha. 
She was focused on getting rid of lithium and approached the subject from 
several directions including a slew of side effect now and in the past
She reported that audio hallucination have been not as loud or frequent, and 
they are becoming "all good" (i.e. voices are no longer telling her she deserves
to be punished or deserves to die, etc.)
She denied feeling depressed, she was not tearful. She denied wishing death or 
suicidal ideation
She denied violent thoughts/homicidal ideation.  
 
Assessment: 
Mesha Neri is a 63-year-old  White Female who was admitted on 4/17/
2018 in a state of acute psychosis. 
Significant improvement since admission. She probably will be discharged 
tomorrow
Mesha has not intention of staying on Lithium
 
Diagnoses (Last updated 5/23/2018):
1) Schizoaffective Disorder, Bipolar type
2) Tardive Dyskinesia
3) Parkinson's Disease
 
Treatment Plan Update:
Reduce Lithium to 150 mg tonight then stop
Continue Zyprexa 20 mg at bedtime
Clonazepam 0.5 MG at bedtime
 
 
Aspirin 81 MG DAILY 04/18 0900 AC 05/30
 
Atorvastatin Calcium 40 MG 1700 04/18 1700 AC 05/29
 
Benzocaine/Menthol 1 ADRIANA Q4 HRS AS NEEDED PRN 04/26 1200 AC 05/14
 
Levothyroxine Sodium 0.137 MG DAILY AC 04/18 0700 AC 05/30
 
Magnesium Hydroxide 30 ML AT BEDTIME PRN 04/17 2200 AC 04/24
 
Metformin HCl 500 MG 0800,1700 05/11 0800 AC 05/30
 
Multivitamins 1 TAB 0800 05/18 0800 AC 05/30
 
 
 
 
Clonazepam 0.5 MG 2000 05/30 2000 AC 
 
Levothyroxine Sodium 0.137 MG DAILY AC 04/18 0700 AC 05/30
 
Magnesium Hydroxide 30 ML AT BEDTIME PRN 04/17 2200 AC 04/24
 
Metformin HCl 500 MG 0800,1700 05/11 0800 AC 05/30
 
Multivitamins 1 TAB 0800 05/18 0800 AC 05/30
 
 
 
DICTATED BY: Gharaibeh MD,Camacho

## 2018-06-01 VITALS — DIASTOLIC BLOOD PRESSURE: 74 MMHG | SYSTOLIC BLOOD PRESSURE: 127 MMHG

## 2018-06-01 NOTE — DISCHARGE SUMMARY REPORT-PSYCH
Visit Information
 
Visit Dates/Diagnosis'
Admission Date:
04/17/18
 
Discharge Date: 06/01/18
Reason for Admission:
acute psychosis
Psy Discharge Primary Diag: schizoaffective Disorder 
 
Hospital Course
Significant Lab Findings:
Treatment team discussed Pt's progress, treatment plan, and aftercare plans. The
treatment team included: LCSWs, RNs, Group Therapy staff, and psychiatrist.
 
Mental Status Examination:
Mesha was alert and oriented to time, place, and person. 
She was not talkative today & was not pressured. There were no delusions during 
the 10 minutes I spent with Mesha. 
She was focused on getting rid of lithium and approached the subject from 
several directions including a slew of side effect now and in the past
She reported that audio hallucination have been not as loud or frequent, and 
they are becoming "all good" (i.e. voices are no longer telling her she deserves
to be punished or deserves to die, etc.)
She denied feeling depressed, she was not tearful. She denied wishing death or 
suicidal ideation
She denied violent thoughts/homicidal ideation.  
 
Assessment: 
Mesha Neri is a 63-year-old  White Female who was admitted on 4/17/
2018 in a state of acute psychosis. 
Significant improvement since admission. She probably will be discharged 
tomorrow
Mesha has not intention of staying on Lithium
 
Diagnoses (Last updated 5/23/2018):
1) Schizoaffective Disorder, Bipolar type
2) Tardive Dyskinesia
3) Parkinson's Disease
 
Treatment Plan Update:
Reduce Lithium to 150 mg tonight then stop
Continue Zyprexa 20 mg at bedtime
Clonazepam 0.5 MG at bedtime
Aspirin 81 MG DAILY 04/18 0900 AC 05/30
Atorvastatin Calcium 40 MG 1700 04/18 1700 AC 05/29
Benzocaine/Menthol 1 ADRIANA Q4 HRS AS NEEDED PRN 04/26 1200 AC 05/14
Levothyroxine Sodium 0.137 MG DAILY AC 04/18 0700 AC 05/30
Magnesium Hydroxide 30 ML AT BEDTIME PRN 04/17 2200 AC 04/24
Metformin HCl 500 MG 0800,1700 05/11 0800 AC 05/30
Multivitamins 1 TAB 0800 05/18 0800 AC 05/30
 
DICTATED BY: Gharaibeh MD,Camacho 
 
Course
Complications:
The patient did not have any major complications while she was on the inpatient 
psychiatric unit.
Consultations:
The patient had a history and physical examination performed by the internist.  
Please refer to the patient's electronic health record for the details of the H&
P.
Allergies:
Coded Allergies:
No Known Allergies (05/24/18)
 
Hospital Course/TX Response:
The patient stayed in the inpatient psychiatric unit for an extended period of 
time 4/17/2018 to 6/1/2018.
During her lengthy stay, the mainstay of her psychotropic regimen regimen was 
Zyprexa.  The dose was gradually titrated at one point reaching 30 mg per day.  
However the dose had to be reduced back to 20 mg because of complaints about 
side effects especially when it was combined with lithium.  The patient was 
pretty much against lithium for most of her stay until about 2 weeks prior to 
her discharge she agreed to start the lithium reluctantly and she did in fact 
stopped taking it but have a slew of complaints about the side effects and made 
it very clear she had that she has no intention of continuing to take this 
medication following her discharge.
Because of her history of not adherent to medications if she does not like them,
I gradually titrated the lithium and it was not included as part of her 
discharge medications.  She received her last dose of lithium (150 mg) the night
before her discharge.
The patient's progress on the unit was slow but consistent.  The patient's 
improvement was considered significant compared to the first 2 or 3 days she was
on the unit as she was incoherent at the time and hit his speech was very close 
to being gibberish.
By the time of her discharge she was very coherent and carrying on very long 
winded conversations.  The patient's delusions remained at the time of her 
discharge but they were significantly more benign and less frequent and not as 
intrusive.
On the first few weeks, she had delusions that her ex- or even family and
grandchildren will be coming to the unit on the weekend to cut of her toes break
her knees cut of her fingers and pocket eyes out and these delusions were 
completely gone by the time of her discharge.
I had the same thing with hallucinations, which were much less frequent and much
more benign at the time of her discharge.  The last week or so of her stay on 
the inpatient unit she reported that she was no longer hearing any of the "bad" 
voices and she has no thoughts that she deserves to die or that she deserves to 
be punished.  These thoughts where very prominent in the first 3 or 4 weeks of 
her admission to the inpatient unit.
 
Discharge HBIPS
- Tobacco Use Treatment Offered
Post DC Medications Offered: Not Applicable
Post DC Tobacco Treatment Plan: Not Applicable
- EtOH/Drug Use D/O Treatment Offered
Post DC Medications Offered: NA-No EtOH/Drug Use D/O
Post DC EtOH/SubAbuse TX Plan: NA-No EtOH/Drug Use D/O
 
Metabolic Screening
- Screen if on a Neuroleptic Medication
- Metabolic screening should include:
- Blood Pressure, BMI, Glucose or Hgb A1c, & a
- Lipid profile from within the past 365 days.
Metabolic Screening
Patient on a neuroleptic(s) .
     Enter below results for Hemoglobin A1C, 
     and lipid panel if obtained during the last 365 days.
 
BMI: 25.700     
 
Blood Pressure: 127/74
 
Laboratory Results From [facility] (If applicable):
 Lab
 
 
Cholesterol 183 MG/DL 04/17/18 1120
 
Cholesterol/HDL Ratio 3 % 04/17/18 1120
 
HDL Cholesterol 59 mg/dL 04/17/18 1120
 
Hemoglobin A1c 6.0 % H 04/17/18 1120
 
LDL Cholesterol, Calc 83 mg/dL 04/17/18 1120
 
Triglycerides 209 mg/dL H 04/17/18 1120
 
 
 
 
Discharge Instructions
 
General Discharge Information
Multiple Neuroleptics:
([X]) Not Applicable
 
 
Discharge Diet Diabetic
Discharge Activity Normal
DC Disposition:
self-care/ staying with ex-
Referrals
Ordered Referrals
Provider Referral 06/05/18
For Groups:
[ Care]
 
 Care intake appt. 6/5/18 2pm  
[address] [place] [address]  
[phone]
 
 
 
Prescriptions
Stop taking the following medications:
Metformin HCl (Metformin HCl) 1,000 MG TABLET ORAL TWICE DAILY Qty = 60
 
Benztropine Mesylate (Benztropine Mesylate) 0.5 MG TABLET ORAL Every Morning Qty
= 90
 
Benztropine Mesylate (Benztropine Mesylate) 0.5 MG TABLET ORAL Every night 
 
Propranolol HCl (Propranolol HCl) 10 MG TABLET ORAL TWICE DAILY 
 
Risperidone (Risperidone) 2 MG TABLET ORAL TWICE DAILY Qty = 60
 
Carbamazepine (Tegretol XR) 200 MG TAB.ER.12H ORAL TWICE DAILY Qty = 60
 
Trazodone HCl (Trazodone HCl) 50 MG TABLET ORAL TAKE AT BEDTIME as needed for 
MENTAL HEALTH/SLEEP 
 
Continue taking these medications:
Atorvastatin Calcium (Lipitor) 40 MG TABLET
    1 Tablet ORAL Every night
    Qty = 30
    Comments:
       Last Taken:5/31/18
             Time:5pm
    This prescription has been renewed
 
Aspirin (Ecotrin*) 81 MG TABLET.DR
    1 Tablet ORAL DAILY
    Qty = 30
    Comments:
       Last Taken:6/1/18
             Time:8am
    This prescription has been renewed
 
Levothyroxine Sodium (Synthroid) 137 MCG TABLET
    1 Tablet ORAL DAILY
    Qty = 60
    Comments:
       Last Taken:6/1/18
             Time:6am
    This prescription has been renewed
 
Start taking the following new medications:
Metformin Hydochloride (Glucophage) 500 MG TABLET
    500 Milligram ORAL 0800,1700
    Qty = 60
    No Refills
    Comments:
        Last Taken:6/1/18
              Time:8am
 
Clonazepam (Klonopin) 1 MG TABLET
    1 Tablet ORAL AT BEDTIME
    Qty = 30
    No Refills
    Comments:
       Last Taken:5/31/18
             Time:8pm
 
Olanzapine (Zyprexa Zydis) 20 MG TAB.RAPDIS
    1 Tablet ORAL Every night
    Qty = 30
    No Refills
    Comments:
       Last Taken:5/31/18
             Time:8pm
 
 
Studies Pending at Discharge
none
Copies To:
 Care

## 2018-06-01 NOTE — SOCIAL WORKER PROG NOTE PSYCH
Social Work Progress Note
Progress Note
Dr. Gharaibeh and I spoke with Mesha this morning about her d/c plan.  Mesha had
mixed feelings about leaving the hospital.  She feels sad about leaving all the 
people that have been so helpful to her.  She is being discontinued from Lithium
due to complaints of side effects throughout the week.  Dr. Gharaibeh reviewed 
her current medications with her.  She continues to have auditory hallucinations
, but they are positive in nature.  Talked about her most likely having a co-pay
for her meds since she is only on Medicare right now.  We will find out what the
cost will be before she leaves today.   
 
Meds cost $7.50.  Mesha had her checkbook to make the payment.  She got her meds
filled here at [facility].  Called [facility] [email or web address] to bring her to [facility].  The hospital paid for the cab fare.  Wished Mesha well and reminded 
her that she can always come back to the  ER if there are any problems.  I 
reminded her of her appt. with [facility] on Tuesday at 2pm.

## 2018-06-01 NOTE — SOCIAL WORKER PROG NOTE PSYCH
Social Work Progress Note
Faxed Referral(s)
   Referred To: [facility]
   Transition of Care Documents sent: Health Summary
   Faxed to:  Care
   Fax #: [phone]
   Faxed by: Virgie Jacome
   Date faxed: 06/01/18
   Time Faxed: [phone]

## 2018-06-01 NOTE — PATIENT DISCHARGE INSTRUCTIONS
Psych Discharge Inst
 
General Discharge Information
Reason for Admission:
acute psychosis
Psy Discharge Primary Diag+ schizoaffective Disorder 
Summary Tests/Major Procedures
 Lab
 
 
Cholesterol 183 MG/DL 04/17/18 1120
 
Cholesterol/HDL Ratio 3 % 04/17/18 1120
 
HDL Cholesterol 59 mg/dL 04/17/18 1120
 
Hemoglobin A1c 6.0 % H 04/17/18 1120
 
LDL Cholesterol, Calc 83 mg/dL 04/17/18 1120
 
Triglycerides 209 mg/dL H 04/17/18 1120
 
 
Studies Pending at DC:
none
 
Patient Instructions
Contact Information
Your Psychiatrist on [facility] was Gharaibeh MD,Numan
 
* If you are experiencing an emergency related to this hospitalization, please 
call [phone] to contact the treating psychiatrist or the psychiatrist-on-
call.
 
* To Request a copy of your medical records, please contact the Medical Records 
Department at [phone].
 
* To request results of studies pending at the time of discharge, please call 
[phone].
 
* Continue your Medications until directed to stop by your Healthcare provider.
 
General Medication Information
Please continue to take your new medications and your continued home medications
, unless otherwise indicated on your discharge medication list, or unless 
directed by your MD or APRN to stop them.
 
 
Special Instructions
Diet Diabetic
Activity Normal
- Tobacco Use Treatment Offered
Post DC Medications Offered: Not Applicable
Post DC Tobacco Treatment Plan: Not Applicable
- EtOH/Drug Use D/O Treatment Offered
Post DC Medications Offered: NA-No EtOH/Drug Use D/O
Post DC EtOH/SubAbuse TX Plan: NA-No EtOH/Drug Use D/O
Metabolic Screening
Patient on a neuroleptic(s) .
     Enter below results for Hemoglobin A1C, 
     and lipid panel if obtained during the last 365 days.
 
BMI: 25.700     
 
Blood Pressure: 127/74
 
Laboratory Results From [facility] (If applicable):
 Lab
 
 
Cholesterol 183 MG/DL 04/17/18 1120
 
Cholesterol/HDL Ratio 3 % 04/17/18 1120
 
HDL Cholesterol 59 mg/dL 04/17/18 1120
 
Hemoglobin A1c 6.0 % H 04/17/18 1120
 
LDL Cholesterol, Calc 83 mg/dL 04/17/18 1120
 
Triglycerides 209 mg/dL H 04/17/18 1120
 
 
 
 
Advance Directives
Does the Patient have Medical Advance Directives No/Refused further info
Does Pt have Psychiatric Advance Directives? No/Refused further info
Does Patient have a Designated Surrogate Decision Maker: No
Information About Psychiatric Advance Directives Provided? Refused
 
Discharge Plan
Post Hospital Treatment Plan:
 Care

## 2018-06-01 NOTE — CP SOUTH PROGRESS NOTE PSYCH
Psych (Inpt) Progress Note
Progress Note
Vital Signs
 
 
Date Time Temp Pulse B/P B/P Pulse O2 O2 Flow FiO2
 
06/01 0749 97.3 89 127/74     
 
05/31 1935 99.1 96 123/70     
 
05/31 1539  97 124/75     
 
 
Treatment team discussed Pt's progress, treatment plan, and aftercare plans. The
treatment team included: LCSWs, RNs, Group Therapy staff, and psychiatrist.
 
Mental Status Examination:
Mesha was alert and oriented to time, place, and person. 
She was not talkative today & was not pressured. There were no delusions during 
the 10 minutes I spent with Mesha. 
She was focused on getting rid of lithium and approached the subject from 
several directions including a slew of side effect now and in the past
She reported that audio hallucination have been not as loud or frequent, and 
they are becoming "all good" (i.e. voices are no longer telling her she deserves
to be punished or deserves to die, etc.)
She denied feeling depressed, she was not tearful. She denied wishing death or 
suicidal ideation
She denied violent thoughts/homicidal ideation.  
 
Assessment: 
Mesha Neri is a 63-year-old  White Female who was admitted on 4/17/
2018 in a state of acute psychosis. 
Significant improvement since admission.  
Diagnoses (Last updated 5/23/2018):
1) Schizoaffective Disorder, Bipolar type
2) Tardive Dyskinesia
3) Parkinson's Disease
 
Treatment Plan Update:
D/C to self care (staying with ex-)
 
3) Parkinson's Disease
 
Treatment Plan Update:
Reduce Lithium to 150 mg tonight then stop
Continue Zyprexa 20 mg at bedtime
Clonazepam 0.5 MG at bedtime

## 2018-06-21 ENCOUNTER — HOSPITAL ENCOUNTER (INPATIENT)
Dept: HOSPITAL 68 - ERH | Age: 63
LOS: 62 days | Discharge: NURSING, REHAB OR LONG-TERM CARE | DRG: 885 | End: 2018-08-22
Attending: PSYCHIATRY & NEUROLOGY | Admitting: PSYCHIATRY & NEUROLOGY
Payer: COMMERCIAL

## 2018-06-21 VITALS — BODY MASS INDEX: 25.61 KG/M2 | HEIGHT: 64 IN | WEIGHT: 150 LBS

## 2018-06-21 DIAGNOSIS — F25.9: Primary | ICD-10-CM

## 2018-06-21 LAB
ABSOLUTE GRANULOCYTE CT: 4.4 /CUMM (ref 1.4–6.5)
BASOPHILS # BLD: 0.1 /CUMM (ref 0–0.2)
BASOPHILS NFR BLD: 0.8 % (ref 0–2)
EOSINOPHIL # BLD: 0.1 /CUMM (ref 0–0.7)
EOSINOPHIL NFR BLD: 1.4 % (ref 0–5)
ERYTHROCYTE [DISTWIDTH] IN BLOOD BY AUTOMATED COUNT: 13.2 % (ref 11.5–14.5)
GRANULOCYTES NFR BLD: 60.9 % (ref 42.2–75.2)
HCT VFR BLD CALC: 34.5 % (ref 37–47)
LYMPHOCYTES # BLD: 2.1 /CUMM (ref 1.2–3.4)
MCH RBC QN AUTO: 30.2 PG (ref 27–31)
MCHC RBC AUTO-ENTMCNC: 33.3 G/DL (ref 33–37)
MCV RBC AUTO: 90.6 FL (ref 81–99)
MONOCYTES # BLD: 0.6 /CUMM (ref 0.1–0.6)
PLATELET # BLD: 170 /CUMM (ref 130–400)
PMV BLD AUTO: 7.9 FL (ref 7.4–10.4)
RED BLOOD CELL CT: 3.81 /CUMM (ref 4.2–5.4)
WBC # BLD AUTO: 7.3 /CUMM (ref 4.8–10.8)

## 2018-06-21 PROCEDURE — G0480 DRUG TEST DEF 1-7 CLASSES: HCPCS

## 2018-06-21 NOTE — ED GENERAL ADULT
History of Present Illness
 
General
Chief Complaint: Psychiatric Related Complaint
Stated Complaint: DELIRIOUS & OFF MEDS
Source: patient
Exam Limitations: clinical condition, poor historian
 
Vital Signs & Intake/Output
Vital Signs & Intake/Output
 Vital Signs
 
 
Date Time Temp Pulse Resp B/P B/P Pulse O2 O2 Flow FiO2
 
     Mean Ox Delivery Rate 
 
06/22 1338 99.2 65 18 123/63  98 Room Air  
 
06/22 0928 97.7 61 15 120/61  99 Room Air Room Air 
 
06/22 0643 98.5 63 18 105/67  98 Room Air  
 
06/22 0514      97 Room Air  
 
06/22 0217 97.8 74 18 136/69  99 Room Air  
 
06/22 0009 98.6 79 18 131/68  98 Room Air  
 
06/21 2154 98.8 77 18 139/75  95 Room Air  
 
 
 ED Intake and Output
 
 
 06/22 0000 06/21 1200
 
Intake Total  
 
Output Total  
 
Balance  
 
   
 
Patient 150 lb 
 
Weight  
 
Weight Reported by Patient 
 
Measurement  
 
Method  
 
 
 
Allergies
Coded Allergies:
No Known Allergies (05/24/18)
 
Reconcile Medications
Aspirin (Ecotrin*) 81 MG TABLET.DR   1 TAB PO DAILY HEART/BLOOD
Atorvastatin Calcium (Lipitor) 40 MG TABLET   1 TAB PO QPM CHOLESTEROL
Clonazepam (Klonopin) 1 MG TABLET   1 TAB PO AT BEDTIME schizoaffective disorder
Levothyroxine Sodium (Synthroid) 137 MCG TABLET   1 TAB PO DAILY THYROID
Metformin Hydochloride (Glucophage) 500 MG TABLET   500 MG PO 0800,1700 DM
Olanzapine (Zyprexa Zydis) 20 MG TAB.RAPDIS   1 TAB PO QPM schizoaffective 
disorder
 
Triage Note:
PT BROUGHT TO ED BY SISTER FOR AMS.  PMH OF SCHIZO
AFFECTIVE, HAS NOT BEEN TAKING HER MEDS FOR APPOX
3 WEEKS.  PMH OF HYPOTHYROID LISTED IN COMPUTER.
"PT STATES MY, THYROID IS FINE"  "THEY DUPED ME AT
[facility]"  PT LIP SMACKING IN TRIAGE.
FLIGHT OF IDEAS.  SISTER STATES PATIENT DROVE TO
HER HOUSE WEARING WINTER CLOTHES.  PT DENIES
SI/HI.  DENIES ETOH/DRUGS.
Triage Nurses Notes Reviewed? yes
Onset: Abrupt
Duration: day(s):
Timing: recent history
HPI:
 
 
 
6/21/18
63 YEAR OLD FEMALE BROUGHT IN BY SISTER FOR NOT TAKING MEDICATIONS, NOT ACTING 
RIGHT HISTORY OF SCHIZO AFFECTIVE DISORDER.
 
Patient denies any complaints on my questioning.  She says she is just here 
because she has no place else to go.  She is disorganized and has pressured 
speech.
Patient was just discharged from the emergency department less than 24 hours 
ago.
(Ben Rey DO)
 
Past History
 
Travel History
Traveled to [place] past 21 day No
 
Medical History
Any Pertinent Medical History? see below for history
Neurological: NONE (3 older sibs have Parkinson's), peripheral neuropathy
EENT: NONE
Cardiovascular: hypertension, hyperlipidemia
Respiratory: NONE
Gastrointestinal: NONE
Hepatic: NONE
Renal: NONE
Musculoskeletal: NONE
Psychiatric: depression, schizo affective disorder
Endocrine: Hypothyroidism 
Blood Disorders: NONE
Cancer(s): NONE
GYN/Reproductive: NONE
History of MRSA: No
History of VRE: No
History of CDIFF: No
 
Surgical History
Surgical History: non-contributory
 
Psychosocial History
Who do you live with Spouse
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
What is your primary language English
Tobacco Use: Quit >30 days ago
ETOH Use: denies use
Illicit Drug Use: denies illicit drug use
 
Family History
Hx Contributory? No
(Ben Rey DO)
 
Review of Systems
 
Review of Systems
Constitutional:
Denies: fever. 
EENTM:
Reports: no symptoms. 
Respiratory:
Reports: no symptoms. 
Cardiovascular:
Reports: no symptoms. 
GI:
Reports: no symptoms. 
Genitourinary:
Reports: no symptoms. 
Musculoskeletal:
Reports: no symptoms. 
Skin:
Reports: no symptoms. 
Neurological/Psychological:
Reports: see HPI. 
Hematologic/Endocrine:
Reports: no symptoms. 
Immunologic/Allergic:
Reports: no symptoms. 
(Ben Rey DO)
 
Physical Exam
 
Physical Exam
General Appearance: alert, awake, anxious, moderate distress
Head: atraumatic, normal appearance
Eyes:
Bilateral: normal appearance, PERRL, EOMI. 
Ears, Nose, Throat: normal pharynx, normal ENT inspection
Neck: normal inspection, supple, full range of motion
Respiratory: normal breath sounds, chest non-tender, no respiratory distress
Cardiovascular: regular rate/rhythm
Peripheral Pulses:
4+ radial (R), 4+ radial (L)
Gastrointestinal: soft, non-tender
Back: normal range of motion
Extremities: no edema
Neurologic/Psych: no motor/sensory deficits, awake, alert
Skin: intact, normal color, warm/dry
 
Core Measures
ACS in differential dx? No
CVA/TIA Diagnosis: No
Sepsis Present: No
Sepsis Focused Exam Completed? No
(Ben Rey DO)
 
Progress
Differential Diagnoses
I considered the following diagnoses in my evaluation of the patient: [SCHIZO 
AFFECTIVE DISORDER, ETOH INTOX, SUBSTANCE ABUSE]
 
Plan of Care:
 Orders
 
 
Procedure Date/time Status
 
Heart Healthy Diet 06/22 B Active
 
Admit to inpatient psych 06/22 1353 Active
 
Continuous Observation Monitor 06/22 0204 Active
 
Add-on Test (ER Only) 06/22 0028 Active
 
THYROID STIMULATING HORMONE 06/21 2325 Complete
 
THYROXINE 06/21 2325 Complete
 
Add-on Test (ER Only) 06/21 2236 Active
 
Continuous Observation Monitor 06/21 2206 Active
 
URINE DRUG SCREEN FOR ER ONLY 06/21 2206 Complete
 
LITHIUM 06/21 2206 Complete
 
ETHANOL 06/21 2206 Complete
 
COMPREHENSIVE METABOLIC PANEL 06/21 2206 Complete
 
CBC WITHOUT DIFFERENTIAL 06/21 2206 Complete
 
ED CRISIS PSYCH CONSULT 06/21 2206 Active
 
 
 Laboratory Tests
 
 
 
06/21/18 2327:
Urine Opiates Screen < 100, Methadone Screen < 40, Barbiturate Screen < 60, Ur 
Phencyclidine Scrn < 6.00, Amphetamines Screen < 100, U Benzodiazepines Scrn < 
85, Urine Cocaine Screen < 50, Urine Cannabis Screen < 5.00
 
06/21/18 2325:
Anion Gap 11, Estimated GFR > 60, BUN/Creatinine Ratio 18.9, Glucose 153  H, 
Calcium 9.0, Total Bilirubin 0.3, AST 18, ALT 37, Alkaline Phosphatase 91, Total
Protein 7.4, Albumin 4.0, Globulin 3.4, Albumin/Globulin Ratio 1.2, TSH 14.100  
H, Thyroxine (T4) 5.5, CBC w Diff NO MAN DIFF REQ, RBC 3.81  L, MCV 90.6, MCH 
30.2, MCHC 33.3, RDW 13.2, MPV 7.9, Gran % 60.9, Lymphocytes % 29.3, Monocytes %
7.6, Eosinophils % 1.4, Basophils % 0.8, Absolute Granulocytes 4.4, Absolute 
Lymphocytes 2.1, Absolute Monocytes 0.6, Absolute Eosinophils 0.1, Absolute 
Basophils 0.1, Lithium < 0.2  L, Serum Alcohol < 10.0
 
Initial ED EKG: none
(Ben Rey DO)
Comments:
6/22/2018 8:14:52 AM patient signed out to me by Dr. Bañuelos at shift change 
over.  Aruna is resting comfortably at this time.
(Kati VALVERDE,Ben MACHADO)
 
Departure
 
Departure
Disposition: STILL A PATIENT
Condition: Stable
Clinical Impression
Primary Impression: Schizo-affective schizophrenia
Referrals:
Gelacio Bowens MD (PCP/Family)
 
Departure Forms:
Customer Survey
General Discharge Information
Comments
 
 
 
The patient is pending evaluation by crisis.  She'll be signed out to Dr. Bañuelos at 1 AM
(Ben Rey DO)
 
Departure
Comments
pt to be signed out to dr. juarez, 6/22/18, 7am. 
(Edda VALVERDE,Yunier CONN)
 
Psych Admission Note
Psychiatric Admission:
I have seen and evaluated PEYMAN REYES.
 
I have also reviewed all the pertinent lab results and diagnostic results.
 
PEYMAN REYES will be admitted to our inpatient Psychiatric unit for treatment
and care.
 
(Kati VALVERDE,Ben MACHADO)
 
Critical Care Note
 
Critical Care Note
Critical Care Time: non-applicable
(Ben Rey DO)

## 2018-06-22 VITALS — SYSTOLIC BLOOD PRESSURE: 124 MMHG | DIASTOLIC BLOOD PRESSURE: 65 MMHG

## 2018-06-22 VITALS — DIASTOLIC BLOOD PRESSURE: 69 MMHG | SYSTOLIC BLOOD PRESSURE: 119 MMHG

## 2018-06-22 VITALS — DIASTOLIC BLOOD PRESSURE: 72 MMHG | SYSTOLIC BLOOD PRESSURE: 129 MMHG

## 2018-06-22 LAB — LITHIUM SERPL-SCNC: < 0.2 MMOL/L (ref 0.6–1.2)
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
## 2018-06-22 NOTE — HISTORY & PHYSICAL
General Information and HPI
MD Statement:
I have seen and personally examined PEYMAN REYES and documented this H&P.
 
The patient is a 63 year old F who presented with a patient stated chief 
complaint of "I hear a lot of things".
 
Source of Information: patient, family, old records
Exam Limitations: unable to give history
History of Present Illness:
63-year-old white female known to [facility] was 
brought in by her sister change in mental status, she has history of 
schizoaffective disorder and has not been taking her medications for about 3 
weeks.  Comes in disorganized nonsense with inappropriate affect, hearing voices
, per daughter acting "strange" wearing winter clothes apparently she is 
homeless and living in a motel.  For this reasons she is admitted for treatment
 
Allergies/Medications
Allergies:
Coded Allergies:
No Known Allergies (05/24/18)
 
Home Med list
Aspirin (Ecotrin*) 81 MG TABLET.DR   1 TAB PO DAILY HEART/BLOOD
Atorvastatin Calcium (Lipitor) 40 MG TABLET   1 TAB PO QPM CHOLESTEROL
Clonazepam (Klonopin) 1 MG TABLET   1 TAB PO AT BEDTIME schizoaffective disorder
Levothyroxine Sodium (Synthroid) 137 MCG TABLET   1 TAB PO DAILY THYROID
Metformin Hydochloride (Glucophage) 500 MG TABLET   500 MG PO 0800,1700 DM
Olanzapine (Zyprexa Zydis) 20 MG TAB.RAPDIS   1 TAB PO QPM schizoaffective 
disorder
 
Compliance With Home Meds: POOR
 
Past History
 
Travel History
Traveled to [place] past 21 day No
 
Medical History
Neurological: NONE (3 older sibs have Parkinson's), peripheral neuropathy
EENT: NONE
Cardiovascular: hypertension, hyperlipidemia
Respiratory: NONE
Gastrointestinal: NONE
Hepatic: NONE
Renal: NONE
Musculoskeletal: NONE
Psychiatric: depression, schizo affective disorder
Endocrine: Hypothyroidism 
Blood Disorders: NONE
Cancer(s): NONE
GYN/Reproductive: NONE
History of MRSA: No
History of VRE: No
History of CDIFF: No
Isolation History: Standard
 
Surgical History
Surgical History: non-contributory
 
Past Family/Social History
 
Psychosocial History
Where do you live? Home
Services at Home: Nursing
ETOH Use: denies use
Illicit Drug Use: denies illicit drug use
 
Review of Systems
 
Review of Systems
Constitutional:
Reports: see HPI. 
 
Exam & Diagnostic Data
Last 24 Hrs of Vital Signs/I&O
 Vital Signs
 
 
Date Time Temp Pulse Resp B/P B/P Pulse O2 O2 Flow FiO2
 
     Mean Ox Delivery Rate 
 
06/22 1538  74  119/69     
 
06/22 1447 98.1 67  129/72     
 
06/22 1338 99.2 65 18 123/63  98 Room Air  
 
06/22 0928 97.7 61 15 120/61  99 Room Air Room Air 
 
06/22 0643 98.5 63 18 105/67  98 Room Air  
 
06/22 0514      97 Room Air  
 
06/22 0217 97.8 74 18 136/69  99 Room Air  
 
06/22 0009 98.6 79 18 131/68  98 Room Air  
 
06/21 2154 98.8 77 18 139/75  95 Room Air  
 
 
 Intake & Output
 
 
 06/22 1600 06/22 0800 06/22 0000
 
Intake Total   
 
Output Total   
 
Balance   
 
    
 
Patient 150 lb  150 lb
 
Weight   
 
Weight   Reported by Patient
 
Measurement   
 
Method   
 
 
 
 
Physical Exam
General Appearance Alert, Oriented X3
Skin No Rashes, No Breakdown
HEENT PERRLA, EOMI
Neck Supple, No JVD
Lymphatic Axillary nl, Cervical nl
Cardiovascular Regular Rate, No Murmurs
Lungs Clear to Auscultation, Normal Air Movement
Abdomen Soft, No Tenderness, No Hepatospenomegaly
Neurological
   Exam Findings: not tested,smashing lips constantly.
   Cranial Nerves II through XII:
Seem intact
Extremities No Edema, Normal Pulses
Vascular Normal Pulses, Pulses Symmetrical
Last 24 Hrs of Labs/Adilson:
 Laboratory Tests
 
06/21/18 2327:
Urine Opiates Screen < 100, Methadone Screen < 40, Barbiturate Screen < 60, Ur 
Phencyclidine Scrn < 6.00, Amphetamines Screen < 100, U Benzodiazepines Scrn < 
85, Urine Cocaine Screen < 50, Urine Cannabis Screen < 5.00
 
06/21/18 2325:
Anion Gap 11, Estimated GFR > 60, BUN/Creatinine Ratio 18.9, Glucose 153  H, 
Calcium 9.0, Total Bilirubin 0.3, AST 18, ALT 37, Alkaline Phosphatase 91, Total
Protein 7.4, Albumin 4.0, Globulin 3.4, Albumin/Globulin Ratio 1.2, TSH 14.100  
H, Thyroxine (T4) 5.5, CBC w Diff NO MAN DIFF REQ, RBC 3.81  L, MCV 90.6, MCH 
30.2, MCHC 33.3, RDW 13.2, MPV 7.9, Gran % 60.9, Lymphocytes % 29.3, Monocytes %
7.6, Eosinophils % 1.4, Basophils % 0.8, Absolute Granulocytes 4.4, Absolute 
Lymphocytes 2.1, Absolute Monocytes 0.6, Absolute Eosinophils 0.1, Absolute 
Basophils 0.1, Lithium < 0.2  L, Serum Alcohol < 10.0
 
 
Diagnostic Data
ITS Data Unobtainable at this time
 
Assessment/Plan
 
As Ranked By This Provider
Problem List:
 1. Schizo affective schizophrenia
 
 2. Bipolar disorder
 
 3. Hypothyroid
 
 4. Diabetes
 
 
Miscellaneous
 
Miscellaneous Documentation
Attending Case Discussed With:
Gharaibeh MD,Camacho
 
Primary Care Physician:
Gelacio Bowens MD
 
Patient sees these Specialists
Psychiatry
Level of Patient Care: ELY Chavis
Consults Needed:
   Consulting Specialty: Psychiatry
   Consulting Physician:
Dr Beard
   Reason for Consult: Schizoaffective disorder dia not med. complaint.

## 2018-06-22 NOTE — ED PSYCH CRISIS CONSULTATION
Crisis Consult
 
Basic Assessment
Date of Consult: 18
Responsible Person/Accompanied By: Meme pt's sister
Insurance Authorization:
Insurance #1:
 
Insurance name: MEDICARE A
Phone number: 
Policy number: [id number]
Group number: 
Authorization number: 
 
 
ED Provider:
Patient's ED Provider: Ben Rey DO
 
Primary Care Physician:
Patient's PCP: Gelacio Bowens MD
PCP's Phone Number: [phone]
 
Current Psychiatrist: Gharaibeh MD, Numan
Chief Complaint: Psychiatric Related Complaint
Patient's Quote: "I hear a lot of things"
Present Illness:
Pt is a 63 year old   female, brought to the ED by her sister 
Meme.  Pt's presents as disorganized, nonsensical. Pt consistently smacked her 
lip as she spoke.  Pt was alert and oriented, she was able to say the dated and 
year and who is the president. Pt denies suicidal or homicial thoughts.
 
However, her affect was inappropriate.  Pt states she was hearing voices telling
her "good things". Pt smiled as she said the voices told her "my brother Sang 
is still alive and he is here with his wife, he has a happy face and he loves 
his automobile"
 
Crisis clinician contacted pt's daughter Jennifer Neri, who reports that her 
mother threw away her medications  and showed up at the pt's sister's house 
today in a winter coat and acting "strange".
According to Jennifer, both the pt and Jennifer's father Jarocho Neri are homeless and 
living at a motel.  As far as Jennifer knows Jarocho Neri is currently inpatient at
[facility] for mental health treament.  
 
Pt's utox screen indicates a Lithium level of 0.2 and a TSH level of 14.100.  
This is consistent with what was reported by the pt's daughter Jennifer and sister 
Meme that the pt hasn't taken her medications for the past three weeks.  
Patient's Address:
[address] [place] [address]
Home Phone Number: [phone]
Other Phone Number: 
 
Who Do You Live With? Other (see notes)
Family/Informants Interviewed: Crisis clinician attempted to contact Meme. [phone]
[phone] no answer. Clinician spoke to Jennifer Neri Daughter who shared that her
mother has been noncompliant with medication treatment for the past 3 weeks. 
Allergies -
Coded Allergies:
No Known Allergies (18)
 
Current Medications -
Scheduled Medications
Aspirin (Ecotrin*) 81 MG TABLET.DR   1 TAB PO DAILY HEART/BLOOD #30 TAB
     Prescribed by Gharaibeh MD,Numan on 18
     Last Taken: At an unknown date and time
Atorvastatin Calcium (Lipitor) 40 MG TABLET   1 TAB PO QPM CHOLESTEROL #30 TAB
     Prescribed by Gharaibeh MD,Numan on 18
     Last Taken: At an unknown date and time
Clonazepam (Klonopin) 1 MG TABLET   1 TAB PO AT BEDTIME schizoaffective disorder
#30 TAB
     Prescribed by Gharaibeh MD,Numan on 18
     Last Taken: At an unknown date and time
Levothyroxine Sodium (Synthroid) 137 MCG TABLET   1 TAB PO DAILY THYROID #60 TAB
     Prescribed by Gharaibeh MD,Numan on 18
     Last Taken: At an unknown date and time
Metformin Hydochloride (Glucophage) 500 MG TABLET   500 MG PO 0800,1700 DM #60 
TAB
     Prescribed by Gharaibeh MD,Numan on 18
     Last Taken: At an unknown date and time
Olanzapine (Zyprexa Zydis) 20 MG TAB.RAPDIS   1 TAB PO QPM schizoaffective 
disorder #30 TAB
     Prescribed by Gharaibeh MD,Numan on 18
     Last Taken: At an unknown date and time
 
Laboratory Results:
 Laboratory Tests
 
18 2327:
Urine Opiates Screen < 100, Methadone Screen < 40, Barbiturate Screen < 60, Ur 
Phencyclidine Scrn < 6.00, Amphetamines Screen < 100, U Benzodiazepines Scrn < 
85, Urine Cocaine Screen < 50, Urine Cannabis Screen < 5.00
 
18 2325:
Anion Gap 11, Estimated GFR > 60, BUN/Creatinine Ratio 18.9, Glucose 153  H, 
Calcium 9.0, Total Bilirubin 0.3, AST 18, ALT 37, Alkaline Phosphatase 91, Total
Protein 7.4, Albumin 4.0, Globulin 3.4, Albumin/Globulin Ratio 1.2, TSH 14.100  
H, Thyroxine (T4) 5.5, CBC w Diff NO MAN DIFF REQ, RBC 3.81  L, MCV 90.6, MCH 
30.2, MCHC 33.3, RDW 13.2, MPV 7.9, Gran % 60.9, Lymphocytes % 29.3, Monocytes %
7.6, Eosinophils % 1.4, Basophils % 0.8, Absolute Granulocytes 4.4, Absolute 
Lymphocytes 2.1, Absolute Monocytes 0.6, Absolute Eosinophils 0.1, Absolute 
Basophils 0.1, Lithium < 0.2  L, Serum Alcohol < 10.0
 
 
Past History
 
Past Medical History
Neurological: NONE (3 older sibs have Parkinson's), peripheral neuropathy
EENT: NONE
Cardiovascular: hypertension, hyperlipidemia
Respiratory: NONE
Gastrointestinal: NONE
Hepatic: NONE
Renal: NONE
Musculoskeletal: NONE
Psychiatric: depression, schizo affective disorder
Endocrine: Hypothyroidism 
Blood Disorders: NONE
Cancer(s): NONE
GYN/Reproductive: NONE
 
Past Surgical History
Surgical History: non-contributory
 
Psychosocial History
Strengths/Capabilities:
Supportive family
Physical Limitations (Interventions):
none
 
Psychiatric Treatment History
Psych Treatment
   Psychiatric Treatment Yes
   Inpatient Treatment Yes
   Outpatient Treatment Yes
   Location of Treatment [facility]
   Reason for Treatment
Schizoaffective Disorder
   Dates of Treatment 18-18
   Response to Treatment
Noncompliant with medication treatment
Diagnosis by History:
Schizoaffective Disorder bipolar type
 
Substance Use/Abuse History
Drug Use/Abuse
   Substances Used/Abused No
   First Use n/a
   Last Used n/a
   How much used/taken n/a
   How often n/a
   For how long n/a
   Route of use n/a
 
Substance Abuse Treatment
Substance Abuse Treatment
   Past Substance Abuse [place] No
   Inpatient Treatment No
   Outpatient Treatment No
   Location of Treatment n/a
   Reason for Treatment
n/a
   Dates of Treatment n/a
   Response to Treatment
n/a
 
Current Mental Status
 
Mental Status
Orientation: Confused
Affect: Anxious
Speech: Incoherent, Mumbled
Neuro-vegetative: Concentration Poor
 
Appearance
Appearance- Dress/Hygiene:
Pt in hosptial attire, but was wearing winter clothes according to family's 
report today.
 
Behaviors
Thought Process: Disorganized, Loose Association
Thought Content: Auditory Hallucinations
Memory: Impaired
Insight: Poor
 
SI/HI Risk Assessment
Past Suicidal Ideation/Attempts No
Current Suicidal Ideation/Att No
Past Homicidal Ideation/Att: No
Current Homicidal Ideation/Attempts No
Degree of Intent: None
Danger To: n/a
Gravely Disabled: Inability, Lack of Insight, Poor Judgment
Risk Factors: age (under 24/over 65), high anxiety/distress, SA/MH hospitalized
Lethality Ratin
 
PTSD Checklist
PTSD Done? pt unable to participate
 
ED Management
Sitter: Yes
Restraints: No
 
DSM5/PS Stressors/Medical Prob
Diagnosis' (DSM 5, Stressors, Medical):
F25.9 Schizoaffective Disorder, Bipolar Type, Tardive Dyskinesia, Parkinson's 
Disease Hypothyrodism,  Diabetes Type 2, Homeless
Current GAF: 14-18
 
Departure
 
Disposition
Psych Medical Clearance
   Date: 18
   Medically Cleared at: 0800
   Time Started: 922
   Time Ended: 955
   Psychiatrist Consulted: Gharaibeh MD, Numan
Date Disposition Established: 18
Time Disposition Established: 1100
Plan for Disposition -
   Modality: Inpatient Psychiatry
   Facility: [facility]
   Follow-up Appt Date: 18
   Follow-Up Appt Time: 1300
   Contact: [facility]
   Telephone: [phone]
Rationale for Disposition:
Pt present to the ED with acute psychotic symptoms. Pt reports auditory 
halluciations, she is disorganized, confused with anxious mood. Pt has been 
noncompliant with medications for the past 3 weeks since her discharge from [facility] 
on 18.  Presently, pt meets criteria for inpatient admission. 
Type of IP Admission: PEC
Referrals
Gelacio Bowens MD (PCP/Family)

## 2018-06-23 VITALS — SYSTOLIC BLOOD PRESSURE: 137 MMHG | DIASTOLIC BLOOD PRESSURE: 69 MMHG

## 2018-06-23 VITALS — SYSTOLIC BLOOD PRESSURE: 141 MMHG | DIASTOLIC BLOOD PRESSURE: 79 MMHG

## 2018-06-23 VITALS — SYSTOLIC BLOOD PRESSURE: 138 MMHG | DIASTOLIC BLOOD PRESSURE: 73 MMHG

## 2018-06-23 VITALS — SYSTOLIC BLOOD PRESSURE: 108 MMHG | DIASTOLIC BLOOD PRESSURE: 67 MMHG
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
## 2018-06-23 NOTE — CPS PROVIDER INIT ASMT PSYCH
Psychiatric Admission
Crisis Worker's Note Reviewed: Yes
Patient Seen and Examined: Yes
Identifying Information:
63 year old   female
Chief Complaint:
"I hear a lot of things"
Reaction to Hospitalization:
The patient was admitted voluntarily
 
History of Present Illness
Onset of Illness:
brought to the ED by her sister Meme.  Pt's presents as disorganized, 
nonsensical. Pt consistently smacked her lip as she spoke.  Pt was alert and 
oriented, she was able to say the dated and year and who is the president. Pt 
denies suicidal or homicial thoughts.
 
However, her affect was inappropriate.  Pt states she was hearing voices telling
her "good things". Pt smiled as she said the voices told her "my brother Sang 
is still alive and he is here with his wife, he has a happy face and he loves 
his automobile"
 
Crisis clinician contacted pt's daughter Jennifer Neri, who reports that her 
mother threw away her medications  and showed up at the pt's sister's house 
today in a winter coat and acting "strange".
According to Jennifer, both the pt and Jennifer's father Jarocho Neri are homeless and 
living at a motel.  As far as Jennifer knows Jarocho Neri is currently inpatient at
[facility] for mental health treament.  
Circumstances Leading to Admission:
Reportedly the patient has been more delusional lately
Problem(s) Justifying Need for Admission:
More delusional than usual
 
Past Psychiatric History
Past Diagnosis(es)- if any:
Schizoaffective disorder bipolar type
Past Precipitating Factors- if any:
Medication nonadherence
- Include inpatient and outpatient treatment
Treatment History:
The patient was discharged from the inpatient psychiatric unit at [facility] on June 1, 2018
It seems that the patient has not followed up with  care as well as the 
discharge plan
The patient has chronic history of schizoaffective disorder with multiple 
inpatient psychiatric hospitalizations
History of Suicide Attempts or Gestures
No history of suicide attempts.
Substance Abuse History:
The patient denied abusing alcohol or other substance his since October 2014
Allergies:
Coded Allergies:
No Known Allergies (05/24/18)
 
Home Med List:
Atorvastatin Calcium (Lipitor) 40 MG TABLET
    1 Tablet ORAL Every night
    Qty = 30
    Comments:
       Last Taken:5/31/18
             Time:5pm
    This prescription has been renewed
 
Aspirin (Ecotrin*) 81 MG TABLET.
    1 Tablet ORAL DAILY
    Qty = 30
    Comments:
       Last Taken:6/1/18
             Time:8am
    This prescription has been renewed
 
Levothyroxine Sodium (Synthroid) 137 MCG TABLET
    1 Tablet ORAL DAILY
    Qty = 60
    Comments:
       Last Taken:6/1/18
             Time:6am
    This prescription has been renewed
 
Start taking the following new medications:
Metformin Hydochloride (Glucophage) 500 MG TABLET
    500 Milligram ORAL 0800,1700
    Qty = 60
    No Refills
    Comments:
        Last Taken:6/1/18
              Time:8am
 
Clonazepam (Klonopin) 1 MG TABLET
    1 Tablet ORAL AT BEDTIME
Olanzapine (Zyprexa Zydis) 20 MG TAB.RAPDIS
    1 Tablet ORAL Every night
- Include any medical condition(s) that may
- impact the patient's recovery/remission
Past Medical History:
Diabetes mellitus, Parkinson's disease, tardive dyskinesia
 
Past History
 
Medical History
Neurological: NONE (3 older sibs have Parkinson's), peripheral neuropathy
EENT: NONE
Cardiovascular: hypertension, hyperlipidemia
Respiratory: NONE
Gastrointestinal: NONE
Hepatic: NONE
Renal: NONE
Musculoskeletal: NONE
Psychiatric: depression, schizo affective disorder
Endocrine: Hypothyroidism 
Blood Disorders: NONE
Cancer(s): NONE
GYN/Reproductive: NONE
History of MRSA: No
History of VRE: No
History of CDIFF: No
Isolation History: Standard
 
Surgical History
Surgical History: non-contributory
 
Psychiatric Family/Social Hx
 
Family History
Psychiatric Illness:
No family psychiatric history according to old records
Substance Use:
No family history of substance abuse or alcoholism according to the previous 
records
Suicides:
No suicides in the family according to records
 
Social History
Living Situation:
Patient is homeless
Significant Relationships (family/friends):
Her ex-, Ford, and his sister and daughter
Education:
High school graduate
Vocation/Occupation:
Unemployed, on disability
Legal:
No legal entanglements
 
Healthly Behaviors Screening
 
Tobacco Screening
Tobacco Use from ED Docu: Quit >30 days ago
- If tobacco counseling indicated
- the following topics are required.
- #1 Recognizing dangerous situations.
- #2 Coping Skills.
- #3 Basic information about quitting.
Status of Tobacco Cessation Counseling: Not Applicable
Cessation Med Status Not Applicable
 
Alcohol Screening
- ETOH screen POS if BAL >=80 or Audit-C>= M4/F3
Audit-C Score from Diag Assess: 0
Blood Alcohol Level:
Laboratory Tests
 
 
 06/21
 
 3318
 
Toxicology 
 
  Serum Alcohol (<10 MG/DL) < 10.0
 
 
 
Alcohol Use Screening Results: Neg per Audit C &/or BAL
- If ETOH counseling indicated
- the following topics are required.
- #1 Express concern about the patient's
- drinking at unhealthy levels, include informing
- of national norms for moderate drinking:
- men <= 14 drinks/week, max 4 drinks/occasion
- women <= 7 drinks/week, max 3 drinks/occasion
- #2 Providing feedback, including linking alcohol to
- negative physical effects (liver injury, hypertension)
- negative emotional effects (relationship problems and
- depression)
- negative occupational consequences (reduced work
- performance)
- #3 Advising the patient to abstain from alcohol or
- to drink below national norms for moderate drinking
- (as listed above).
Status of ETOH Use Counseling: N/A B/C NO ETOH Use
 
Metabolic Screening
- Screen if on a Neuroleptic Medication
- Metabolic screening should include:
- Blood Pressure, BMI, Glucose or Hgb A1c, & a
- Lipid profile from within the past 365 days.
Metabolic Screening
 Patient on a neuroleptic(s) .
     Enter below results for Hemoglobin A1C, 
     and lipid panel if obtained during the last 365 days.
 
BMI: 25.700     
 
Blood Pressure: 137/69
 
Laboratory Results From [facility] (If applicable):
 Lab
 
 
Cholesterol 183 MG/DL 04/17/18 1120
 
Cholesterol/HDL Ratio 3 % 04/17/18 1120
 
HDL Cholesterol 59 mg/dL 04/17/18 1120
 
Hemoglobin A1c 6.0 % H 04/17/18 1120
 
LDL Cholesterol, Calc 83 mg/dL 04/17/18 1120
 
Triglycerides 209 mg/dL H 04/17/18 1120
 
 
 
 
Exam and Plan
 
Mental Status Examination
Ambulation Status:
The patient was steady on her feet.
Appearance:
The patient shows tardive dyskinesia over the oral facial muscles as well as 
some parkinsonian tremors
Attitude towards examiner:
The patient was calm and cooperative
Psychomotor activity:
Dyskinesia and tremors
Behavior:
No abnormal or bizarre behaviors
Quality of speech:
Talkative with mild pressure
Affect:
Full range of affect
Mood:
Slightly elevated
Suicidal Ideation:
Denied thoughts of suicide
Homicidal Ideation:
Denied thoughts of violence or homicide
Hallucinations:
She acknowledges multiple auditory hallucinations
Paranoid/Delusional Material:
There was no paranoia but there were grandiose delusions
Difficulties with thought organization:
He has the patient tends to be tangential circumstantial with some flights of 
ideas as well
Insight:
Poor insight
Judgment:
Poor judgment
Orientation:
Alert and oriented to time, place, and person.
Cognition:
She does not seem to have difficulties with information processing what she does
have significant difficulty with attention and concentration
Memory Function:
No evidence of short-term memory impairments
Estimate of intellectual functioning:
Average
 
Assets/Strengths
Patient Identified Assets/Strengths:
The patient is intelligent, likable, and social
 
Impression/Plan
Impression and Plan:
63-year-old  white female with history of schizoaffective disorder 
returns approximately 3 weeks after her discharge from the inpatient unit 
because of what seems to be more delusional than usual and some thought disorder
- Include all active medical diagnosis that require tx
DSM 5 Diagnosis(es):
Schizoaffective disorder, bipolar type
Tardive dyskinesia of the oral facial muscles
Parkinson's disease
- Initial Tx Plan for Active Psych & Medical Conditions
Treatment Plan:
Inpatient psychiatric care
Resume Zyprexa
Continue other medications unchanged
Nursing assessments, vital signs, and patient education
Biopsychosocial assessment, collateral information, and aftercare planning by 
social work
Group therapy, milieu therapy, and activities therapy
- Factors that would help patient function
- in a less restrictive setting.
Factors:
Patient will be discharge once his psychotic symptoms are under reasonable 
control General Cardiac

## 2018-06-24 VITALS — SYSTOLIC BLOOD PRESSURE: 130 MMHG | DIASTOLIC BLOOD PRESSURE: 78 MMHG

## 2018-06-24 VITALS — DIASTOLIC BLOOD PRESSURE: 74 MMHG | SYSTOLIC BLOOD PRESSURE: 126 MMHG

## 2018-06-24 VITALS — SYSTOLIC BLOOD PRESSURE: 105 MMHG | DIASTOLIC BLOOD PRESSURE: 59 MMHG

## 2018-06-24 VITALS — DIASTOLIC BLOOD PRESSURE: 71 MMHG | SYSTOLIC BLOOD PRESSURE: 144 MMHG

## 2018-06-24 NOTE — CP SOUTH PROGRESS NOTE PSYCH
Psych (Inpt) Progress Note
Progress Note
Vital Signs
 
 
Date Time Temp Pulse B/P B/P O2
 
06/24 1206  82 144/71  
 
06/24 0739 96.7 86 130/78  
 
06/23 1957 97.9 73 108/67  
 
Mental Status Examination:
The patient remains grandiose/manic, talking about living in a mansion after her
discharge, talking about brand-new life, and yesterday was talking about the 
of the hospital coming to visit her.  The patient was steady on her feet. The 
patient shows tardive dyskinesia over the oral facial muscles as well as some 
parkinsonian tremors
The patient was calm and cooperative
Dyskinesia and tremors, No abnormal or bizarre behaviors, Talkative with mild 
pressure
The patient reported that her mood is very happy, and she looked elevated/
expansive in her affect
Denied thoughts of suicide, Denied thoughts of violence or homicide, She 
acknowledges multiple auditory hallucinations, There was no paranoia but there 
were grandiose delusions, He has the patient tends to be tangential 
circumstantial with some flights of ideas as well, Poor insight, Poor judgment, 
Alert and oriented to time, place, and person. She does not seem to have 
difficulties with information processing what she does have significant 
difficulty with attention and concentration, no evidence of short-term memory 
impairments.
 
Assessment:
63-year-old  white female with history of schizoaffective disorder 
returns approximately 3 weeks after her discharge from the inpatient unit 
because of what seems to be more delusional than usual and some thought disorder
Mesha remains in a manic-psychotic state with grandiose grandiose delusions but 
no significant thought disorder and she is in good behavioral control, calm and 
pleasant
 
Diagnosis(es):
Schizoaffective disorder, bipolar type
Tardive dyskinesia of the oral facial muscles
Parkinson's disease
 
Treatment Plan:
Increase Zyprexa to 15 mg at bedtime
Cepacol lozenges for her oral dyskinesia
 
 
Cognition:
She does not seem to have difficulties with information processing what she does
have significant difficulty with attention and concentration
Memory Function:
No evidence of short-term memory impairments
Estimate of intellectual functioning:
Average
 
Assets/Strengths
Patient Identified Assets/Strengths:
The patient is intelligent, likable, and social
 
Impression/Plan
Impression and Plan:
63-year-old  white female with history of schizoaffective disorder 
returns approximately 3 weeks after her discharge from the inpatient unit 
because of what seems to be more delusional than usual and some thought disorder
- Include all active medical diagnosis that require tx
DSM 5 Diagnosis(es):
Schizoaffective disorder, bipolar type
Tardive dyskinesia of the oral facial muscles
Parkinson's disease
- Initial Tx Plan for Active Psych & Medical Conditions
Treatment Plan:
Inpatient psychiatric care
Resume Zyprexa
Continue other medications unchanged
Nursing assessments, vital signs, and patient education
Biopsychosocial assessment, collateral information, and aftercare planning by 
social work
Group therapy, milieu therapy, and activities therapy

## 2018-06-25 VITALS — SYSTOLIC BLOOD PRESSURE: 136 MMHG | DIASTOLIC BLOOD PRESSURE: 63 MMHG

## 2018-06-25 VITALS — SYSTOLIC BLOOD PRESSURE: 147 MMHG | DIASTOLIC BLOOD PRESSURE: 72 MMHG

## 2018-06-25 VITALS — SYSTOLIC BLOOD PRESSURE: 137 MMHG | DIASTOLIC BLOOD PRESSURE: 71 MMHG

## 2018-06-25 VITALS — DIASTOLIC BLOOD PRESSURE: 78 MMHG | SYSTOLIC BLOOD PRESSURE: 119 MMHG

## 2018-06-25 NOTE — CP SOUTH PROGRESS NOTE PSYCH
Psych (Inpt) Progress Note
Progress Note
Vital Signs
 
 
Date Time Temp Pulse Resp B/P B/P Pulse O2 O2 Flow FiO2
 
     Mean Ox Delivery Rate 
 
06/25 1535  88  136/63     
 
06/25 1208  83  137/71     
 
06/25 0756 96.7 92  119/78     
 
1953 98.0 84  105/59     
 
 
Mental Status Examination:
grandiose/manic, talking about living in a mansion, talking about brand-new life
, steady on her feet. The patient shows orofacial tardive dyskinesia 
parkinsonian tremors, calm and cooperative
Dyskinesia and tremors, talkative with mild pressure, reported that her mood is 
very happy, and she looked elevated/expansive in her affect, denied thoughts of 
suicide, Denied thoughts of violence or homicide, She acknowledges multiple 
auditory hallucinations, There was no paranoia but there were grandiose 
delusions, He has the patient tends to be tangential circumstantial with some 
flights of ideas as well, Poor insight, Poor judgment, Alert and oriented to 
time, place, and person. She does not seem to have difficulties with information
processing what she does have significant difficulty with attention and 
concentration.
 
Assessment:
63-year-old  white female with history of schizoaffective disorder 
returns approximately 3 weeks after her discharge from the inpatient unit 
because of what seems to be more delusional than usual and some thought disorder
Mesha continues to have grandiose delusions but no significant thought disorder 
and she is in good behavioral control, calm and pleasant
 
Diagnoses:
Schizoaffective disorder, bipolar type
Tardive dyskinesia of the oral facial muscles
Parkinson's disease
 
Treatment Plan:
continue Zyprexa 15 mg at bedtime
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
## 2018-06-26 VITALS — SYSTOLIC BLOOD PRESSURE: 148 MMHG | DIASTOLIC BLOOD PRESSURE: 73 MMHG

## 2018-06-26 VITALS — DIASTOLIC BLOOD PRESSURE: 70 MMHG | SYSTOLIC BLOOD PRESSURE: 129 MMHG

## 2018-06-26 VITALS — SYSTOLIC BLOOD PRESSURE: 109 MMHG | DIASTOLIC BLOOD PRESSURE: 70 MMHG

## 2018-06-26 VITALS — DIASTOLIC BLOOD PRESSURE: 82 MMHG | SYSTOLIC BLOOD PRESSURE: 126 MMHG

## 2018-06-26 NOTE — CP SOUTH PROGRESS NOTE PSYCH
Psych (Inpt) Progress Note
Progress Note
Vital Signs
 
 
Date Time Temp Pulse B/P
 
06/26 1219  99 148/73
 
06/26 0741 97.2 78 109/70
 
06/25 1943 97.3 75 147/72
 
Mental Status Examination:
grandiose delusions
orofacial tardive dyskinesia 
parkinsonian tremors, 
She is steady on her feet, calm and cooperative
talkative with mild pressure, reported that her mood is very happy, and she 
looked elevated/expansive in her affect, denied thoughts of suicide, denied 
thoughts of violence or homicide, acknowledged multiple auditory hallucinations,
tangential circumstantial with some flights of ideas as well, alert and oriented
to time, place, and person. She does not seem to have difficulties with 
information processing what she does have significant difficulty with attention 
and concentration.
 
Assessment:
Jolie is a 63-year-old  white female with history of schizoaffective 
disorder returns approximately 3 weeks after her discharge from the inpatient 
unit because of what seems to be more delusional than usual and some thought 
disorder. Mesha continues to have grandiose delusions but no significant thought
disorder and she is in good behavioral control, calm and pleasant
 
Diagnoses:
Schizoaffective disorder, bipolar type
Tardive dyskinesia of the oral facial muscles
Parkinson's disease
 
Treatment Plan:
Increase Zyprexa to 20 mg at bedtime
 
 
Al Hydroxide/Mg  30 ML Q4-6 PRN PRN 06/22 1500 AC 
 
Aspirin Buffered 81 MG DAILY 06/23 0900  06/26
 
Atorvastatin Calcium 40 MG 1700 06/22 1700  06/25
 
Benzocaine/Menthol 1 ADRIANA Q4 HRS AS NEEDED PRN 06/24 1245 AC 06/26
 
Calcium/Vitamin D 250 MG DAILY 06/23 0934  06/26
 
Levothyroxine Sodium 0.125 MG DAILY  06/23 0932  06/26
 
Lorazepam 0.5 MG Q4 HRS AS NEEDED PRN 06/22 1930  06/26
 
Lorazepam 1 MG Q6-PRN PRN 06/22 1500 AC 
 
Metformin HCl 500 MG 0800,1700 06/22 1700 AC 06/22
 
Multivitamins 1 TAB DAILY 06/23 0933  06/26

## 2018-06-26 NOTE — SOCIAL WORKER PROG NOTE PSYCH
Social Work Progress Note
Progress Note
Mesha was in bed this morning.  I asked why she wasn't in group?  She said 
"Jarocho is coming to get me at 11."  Mesha thinks she is being discharged today. 
I told her that was not happening.  She asked why she had to stay?  I told her 
we are treating her symptoms due to some of what she is talking about is not 
reality.  She continues to say that she has a big house in [place] to go to.  
She gets very giddy talking about it.  I asked if she ever received her Social 
Security money?  She said no, but then went on to say that she "has lots of 
money." She said she likes it at [place] and she is learning things.  When asked
what she is learning?  She said "to be good to myself."  Encouraged her to get 
out of bed and go to group.  She eventually did.  Remains delusional about her 
current situation.

## 2018-06-27 VITALS — DIASTOLIC BLOOD PRESSURE: 71 MMHG | SYSTOLIC BLOOD PRESSURE: 142 MMHG

## 2018-06-27 VITALS — SYSTOLIC BLOOD PRESSURE: 128 MMHG | DIASTOLIC BLOOD PRESSURE: 71 MMHG

## 2018-06-27 VITALS — DIASTOLIC BLOOD PRESSURE: 62 MMHG | SYSTOLIC BLOOD PRESSURE: 131 MMHG

## 2018-06-27 VITALS — DIASTOLIC BLOOD PRESSURE: 70 MMHG | SYSTOLIC BLOOD PRESSURE: 106 MMHG

## 2018-06-27 NOTE — SOCIAL WORKER PROG NOTE PSYCH
Social Work Progress Note
Progress Note
Met with Mesha this morning.  She was talking with the man who was cleaning the 
fish tank.  She stated she "doesn't need medications, that all she needs is 
Jarocho." She reports "feeling good" , was elated and giggly.  She reports being 
tired, and states she didn't sleep well.  She stated "[facility] took 
my thyroid out because i was valerie Rodriguez (her daughter)." Her insight and 
judgement is very poor.  Asked if she would consider going to [facility] in
[place].  She refused. She stated "Jarocho and I have a  to take us places.
" She is very focused on Jarocho. Delusional - seems much worst than prior 
admissions over the years. She reports no SI/Hi, denied hearing voices and 
seeing things.

## 2018-06-27 NOTE — CP SOUTH PROGRESS NOTE PSYCH
Psych (Inpt) Progress Note
Progress Note
Vital Signs
 
 
Date Time Temp Pulse Resp B/P B/P Pulse O2 O2 Flow FiO2
 
06/27 1226  94  142/71     
 
06/27 0746 97.4 88  106/70     
 
06/26 1938 98.3 87  126/82     
 
06/26 1540  68  129/70     
 
 
Mental Status Examination:
grandiose delusions are unchanges, no pressure in her speech, she is pleant, 
calm and cooperative
orofacial tardive dyskinesia 
parkinsonian tremors, 
She is steady on her feet, reported that her mood is very happy, and she looked 
elevated/expansive in her affect, denied thoughts of suicide, denied thoughts of
violence or homicide, multiple auditory hallucinations are less disctracting 
today
thoughts are more coherent,
alert and oriented to time, place, and person. She does not seem to have 
difficulties with information processing what she does have significant 
difficulty with attention and concentration.
 
Assessment:
Jolie is a 63-year-old  white female with history of schizoaffective 
disorder returns approximately 3 weeks after her discharge from the inpatient 
unit because of what seems to be more delusional than usual and some thought 
disorder. 
Mesha continues to have grandiose delusions but no significant thought disorder 
and she is in good behavioral control, calm and pleasant
 
Diagnoses:
Schizoaffective Disorder, bipolar type
Tardive dyskinesia of the oral facial muscles
Parkinson's disease
 
Treatment Plan:
Increase Zyprexa to 20 mg at bedtime
Atorvastatin Calcium 40 MG 
Benzocaine/Menthol 1 ADRIANA Q4 HRS AS NEEDED PRN  
Levothyroxine Sodium 0.125 MG DAILY AC 
Lorazepam 0.5 MG Q4 HRS AS NEEDED PRN 
Lorazepam 1 MG Q6-PRN PRN 06

## 2018-06-28 VITALS — DIASTOLIC BLOOD PRESSURE: 77 MMHG | SYSTOLIC BLOOD PRESSURE: 122 MMHG

## 2018-06-28 VITALS — DIASTOLIC BLOOD PRESSURE: 58 MMHG | SYSTOLIC BLOOD PRESSURE: 123 MMHG

## 2018-06-28 VITALS — DIASTOLIC BLOOD PRESSURE: 76 MMHG | SYSTOLIC BLOOD PRESSURE: 121 MMHG

## 2018-06-28 VITALS — SYSTOLIC BLOOD PRESSURE: 135 MMHG | DIASTOLIC BLOOD PRESSURE: 59 MMHG

## 2018-06-28 NOTE — SOCIAL WORKER PROG NOTE PSYCH
Social Work Progress Note
Progress Note
I Naun Semaj met with Mesha today to see how she was doing. She was 
tangential and cooperative talking with me. She mentioned having a house in 
[place] with the plan that she was going to live there with Cathy. She brought
up how she liked my shoes and she wanted to buy Cathy new loafers. She also 
mentioned that cathy  is going to come and take her out of here like a rapture. 
She expressed having an elevated mood but also being tired eventhough she 
claimed that she slept. She reports hearing voices that her brothers are alive 
but do not want her to see them.  She mentioned that her sister and daughter 
made her get rid of her wedding band but she says she got it back. She was 
talking about not saying goodbye to Eileen last time she left and enjoyed the 
cookies she made. She said that the med that the nurses put her on did not work.
continuing to work on appropriate discharge plan with José Antonio Townsend.

## 2018-06-28 NOTE — CP SOUTH PROGRESS NOTE PSYCH
Psych (Inpt) Progress Note
Progress Note
Vital Signs
 
 
Date Time Temp Pulse Resp B/P B/P Pulse O2 O2 Flow FiO2
 
06/28 1204  85  135/59     
 
06/28 0740 97.6 91  121/76     
 
06/27 1938 97.9 83  128/71     
 
06/27 1619  71  131/62     
 
 
 
Mental Status Examination:
Mesha was alert and oriented to time, place, and person. 
grandiose delusions are unchanged, calm and cooperative
orofacial tardive dyskinesia unchanged
parkinsonian tremors (appear to be less pronounced) 
She is steady on her feet, reported that her mood is very happy, and she looked 
elevated/expansive in her affect, denied thoughts of suicide, denied thoughts of
violence or homicide, 
Mesha seemed less distracted by her auditory hallucinations, thoughts are more 
coherent
 
Assessment:
Jolie is a 63-year-old  white female with history of schizoaffective 
disorder returns approximately 3 weeks after her discharge from the inpatient 
unit because of what seems to be more delusional than usual and some thought 
disorder. 
Mesha continues to have grandiose delusions but no significant thought disorder 
and she is in good behavioral control, calm and pleasant
 
Diagnoses:
Schizoaffective Disorder, bipolar type
Tardive dyskinesia of the oral facial muscles
Parkinson's disease
 
Treatment Plan update:
Continue Zyprexa 20 mg at bedtime
Atorvastatin Calcium 40 MG 
Benzocaine/Menthol 1 ADRIANA Q4 HRS AS NEEDED PRN  
Levothyroxine Sodium 0.125 MG DAILY AC 
Lorazepam 0.5 MG Q4 HRS AS NEEDED PRN

## 2018-06-29 VITALS — DIASTOLIC BLOOD PRESSURE: 69 MMHG | SYSTOLIC BLOOD PRESSURE: 131 MMHG

## 2018-06-29 VITALS — SYSTOLIC BLOOD PRESSURE: 128 MMHG | DIASTOLIC BLOOD PRESSURE: 78 MMHG

## 2018-06-29 VITALS — SYSTOLIC BLOOD PRESSURE: 133 MMHG | DIASTOLIC BLOOD PRESSURE: 70 MMHG

## 2018-06-29 VITALS — SYSTOLIC BLOOD PRESSURE: 124 MMHG | DIASTOLIC BLOOD PRESSURE: 89 MMHG

## 2018-06-29 NOTE — CP SOUTH PROGRESS NOTE PSYCH
Psych (Inpt) Progress Note
Progress Note
Mental Status Examination:
Mesha was alert and oriented to time, place, and person. 
grandiose delusions are unchanged, 
today she was neither calm nor cooperative
Perseverating and ruminating about being discharged today and her ex- 
coming and picking her up.  She was irritable and rude (which is unlike her)
parkinsonian tremors (appear to be less pronounced) 
She reported that her mood is very happy, and she looked elevated/expansive in 
her affect, denied thoughts of suicide, denied thoughts of violence or homicide,
 
Mesha seemed less distracted by her auditory hallucinations
 
Assessment:
Jolie is a 63-year-old  white female with history of schizoaffective 
disorder returns approximately 3 weeks after her discharge from the inpatient 
unit because of what seems to be more delusional than usual and some thought 
disorder. 
Mesha continues to have grandiose delusions but no significant thought disorder 
Today , she seemed to have worsened regarding her irritability and got agitated
 
Diagnoses:
Schizoaffective Disorder, bipolar type
Tardive dyskinesia of the oral facial muscles
Parkinson's disease
 
Treatment Plan update:
Continue Zyprexa 20 mg at bedtime
Atorvastatin Calcium 40 MG 
Benzocaine/Menthol 1 ADRIANA Q4 HRS AS NEEDED PRN  
Levothyroxine Sodium 0.125 MG DAILY AC 
Lorazepam 0.5 MG Q4 HRS AS NEEDED PRN

## 2018-06-29 NOTE — SOCIAL WORKER PROG NOTE PSYCH
**See Addendum**
Social Work Progress Note
Progress Note
Sw met with the patient for her daily assessment. The patient appears irritable 
and anxious with mood lability. She stated that her mood was "elated," and did 
not elaborate further. She demanded to be discharged today, "stating that Jarocho 
is on his way here now in a taxi." When asked about treatment after discharge 
and who will prescribe her medications, she stated that she is better and that 
"she does not need that anymore." She reports that she is not taking medications
and that she is only on vitamins, that she will get over the counter. SW asked 
about treatment at [facility] and signing a release, which she refused. 
She became very irritable, swung open the door and walked over to Dr. Gharaibeh'
s office, to demand to leave today. Dr. Gharaibeh told her, that she will not be
able to be discharged until Jarocho participates in a family meeting. Dr. Gharaibeh reminded her that her goal was to contact him and ask him to call the 
staff here. She appeared to calm down and stated she will contact him and ask 
him to call and set up an appointment.

## 2018-06-30 VITALS — SYSTOLIC BLOOD PRESSURE: 132 MMHG | DIASTOLIC BLOOD PRESSURE: 81 MMHG

## 2018-06-30 VITALS — DIASTOLIC BLOOD PRESSURE: 84 MMHG | SYSTOLIC BLOOD PRESSURE: 123 MMHG

## 2018-06-30 VITALS — SYSTOLIC BLOOD PRESSURE: 124 MMHG | DIASTOLIC BLOOD PRESSURE: 74 MMHG

## 2018-06-30 VITALS — DIASTOLIC BLOOD PRESSURE: 73 MMHG | SYSTOLIC BLOOD PRESSURE: 120 MMHG

## 2018-06-30 NOTE — CP SOUTH PROGRESS NOTE PSYCH
Psych (Inpt) Progress Note
Progress Note
Chief Complaint 

 
Discussed overnight events with RN and staff. 
As per RN report, the patient was waking up several times during the night shift
, she was observed masturbating in her room loudly. She received one dose of 
Ativan 1 mg PRN and one dose of Tylenol for restless leg pain at 04:11 am. 
However, no restrains required in the last 24hrs.
 
I saw the patient this morning with the treatment team. She reported feeling 
good although she continues to have irritability at times, grandiosity and 
elated mood at times. She appeared distracted and RTIS. She is inappropriate at 
times. She denies suicidal/homicidal thoughts/intent/plans at present. She is 
isolative and withdrawn, minimally interacting with peers. She has adequate 
appetite and sleep. She is compliant to medications, no side effects reported 
and none in evidence. 
 
Vitals reviewed
 Vital Signs
 
 
 Result Date Time
 
B/P 124/74 06/30 1935
 
Temp 99.1 06/30 1935
 
Pulse 91 06/30 1935
 
Pulse Ox 98 06/22 1338
 
O2 Delivery Room Air 06/22 1338
 
Resp 18 06/22 1338
 
O2 Flow Rate Room Air 06/22 0928
 
 
 
Mental Status Examination
Consciousness: Awake, alert
Orientation: A&O x3, oriented to person, place, year, not to situation
Appearance: appears stated age, dressed in casual attire, poor hygiene
Behavior: calm and cooperative 
Attitude: guarded
Eye Contact: staring at times
Speech: articulate, fluent but need prompting at times 
Mood: fine
Affect: mood-congruent, constricted to euphoric with full emotional reactivity
Thought Process: organized 
Thought Content: delusional, euphoric, hypersexual, irritable at times, denies 
suicidal/homicidal ideations at the time of the interview, no delusions elicited
/verbalized at present
Perceptions:  appears responding to internal stimuli, reported auditory 
hallucinations 
Attention: distractible 
Memory:  grossly intact 
Insight: limited 
Judgment: limited
Slight psychomotor excitability noted
Motor: no abnormal movements, Gait WNL
Neuro: grossly intact
Independent in ADL's and iADL's
 
Labs Reviewed as documented in chart.
Allergy Reviewed as documented in chart.
Medications Reviewed as documented in chart.
 
 
 Current Medications
 
 
  Sig/Sharan Start time  Last
 
Medication Dose Route Stop Time Status Admin
 
Acetaminophen 650 MG .STK-MED ONE 06/30 0410 DC 
 
  PO 06/30 0411  
 
Acetaminophen 650 MG Q4P PRN 06/22 1500 AC 06/30
 
  PO   0411
 
Al Hydroxide/Mg  30 ML Q4-6 PRN PRN 06/22 1500 AC 
 
Hydroxide  PO   
 
Aspirin Buffered 81 MG DAILY 06/23 0900 AC 06/30
 
  PO   0744
 
Atorvastatin Calcium 40 MG 1700 06/22 1700 AC 06/29
 
  PO   1737
 
Calcium/Vitamin D 250 MG DAILY 06/23 0934 AC 06/30
 
  PO   0744
 
Levothyroxine Sodium 0.125 MG DAILY AC 06/23 0932 AC 06/26
 
  PO   0707
 
Lorazepam 1 MG Q4 HRS AS NEEDED PRN 06/29 1145 AC 06/30
 
  PO   0411
 
Metformin HCl 500 MG 0800,1700 06/22 1700 AC 06/30
 
  PO   1700
 
Multivitamins 1 TAB DAILY 06/23 0933 AC 06/30
 
  PO   0744
 
Olanzapine 20 MG AT BEDTIME 06/26 2100 AC 06/30
 
  PO   2039
 
 
 
Assessment
Mesha is a 63-year-old  white female with history of schizoaffective 
disorder, bipolar type she continues to require further stabilization at this 
point.
 
Diagnosis
Schizoaffective Disorder, bipolar type
Tardive dyskinesia of the oral facial muscles
Parkinson's disease
DM
Hyperlipidemia
Hypothyroidism
 
Treatment Plan
Continue current medication regimen 
A1C and Lipid profile ordered for Sunday morning

## 2018-07-01 VITALS — DIASTOLIC BLOOD PRESSURE: 77 MMHG | SYSTOLIC BLOOD PRESSURE: 142 MMHG

## 2018-07-01 VITALS — SYSTOLIC BLOOD PRESSURE: 133 MMHG | DIASTOLIC BLOOD PRESSURE: 70 MMHG

## 2018-07-01 VITALS — SYSTOLIC BLOOD PRESSURE: 141 MMHG | DIASTOLIC BLOOD PRESSURE: 78 MMHG

## 2018-07-01 VITALS — DIASTOLIC BLOOD PRESSURE: 79 MMHG | SYSTOLIC BLOOD PRESSURE: 138 MMHG

## 2018-07-01 NOTE — CP SOUTH PROGRESS NOTE PSYCH
Psych (Inpt) Progress Note
Progress Note
Chief Complaint 

 
Discussed overnight events with RN and staff.
no restrains required in the last 24hrs.
As per RN report, the patient appeared to be sleeping well
 
I saw the patient this morning with the treatment team. She reported feeling 
well and good stated god is talking to me, he is asking me to change the world

talking to self at times. However, denies SI/HI. She has adequate appetite and 
sleep. She is compliant to medications. She is attending groups and interacting 
with peers. No safety concerns.
 
Vitals reviewed
 Vital Signs
 
 
 Result Date Time
 
B/P 142/77 07/01 1545
 
Pulse 82 07/01 1545
 
Temp 98.0 07/01 0746
 
Pulse Ox 98 06/22 1338
 
O2 Delivery Room Air 06/22 1338
 
Resp 18 06/22 1338
 
O2 Flow Rate Room Air 06/22 0928
 
 
 
Mental Status Examination
Consciousness: Awake, alert
Orientation: A&O x3, oriented to person, place, year, not to situation
Appearance: appears stated age, dressed in casual attire, poor hygiene
Behavior: calm and cooperative 
Attitude: open and engaged
Eye Contact: staring at times
Speech: articulate, fluent, talkative with normal rate
Mood: good
Affect: mood-congruent, euphoric with full reactivity
Thought Process: disorganized 
Thought Content: delusional god is talking to me, paranoid they are coming 
from the TV after me, euphoric, but denies suicidal/homicidal ideations at the 
time of the interview
Perceptions:  appears responding to internal stimuli, reported auditory 
hallucinations god is talking to me
Attention: distractible 
Memory:  grossly intact 
Insight: limited 
Judgment: limited
Slight psychomotor excitability noted
Motor: no abnormal movements, Gait WNL
Neuro: grossly intact
Independent in ADL's and iADL's
 
Labs Reviewed as documented in chart.
Allergy Reviewed as documented in chart.
Medications Reviewed as documented in chart.
 Current Medications
 
 
  Sig/Sharan Start time  Last
 
Medication Dose Route Stop Time Status Admin
 
Acetaminophen 650 MG Q4P PRN 06/22 1500 AC 06/30
 
  PO   0411
 
Al Hydroxide/Mg  30 ML Q4-6 PRN PRN 06/22 1500 AC 
 
Hydroxide  PO   
 
Aspirin Buffered 81 MG DAILY 06/23 0900 AC 07/01
 
  PO   1030
 
Atorvastatin Calcium 40 MG 1700 06/22 1700 AC 07/01
 
  PO   1707
 
Calcium/Vitamin D 250 MG DAILY 06/23 0934 AC 07/01
 
  PO   1029
 
Levothyroxine Sodium 0.125 MG DAILY AC 06/23 0932 AC 06/26
 
  PO   0707
 
Lorazepam 1 MG Q4 HRS AS NEEDED PRN 06/29 1145 AC 06/30
 
  PO   0411
 
Metformin HCl 500 MG 0800,1700 06/22 1700 AC 07/01
 
  PO   1707
 
Multivitamins 1 TAB DAILY 06/23 0933 AC 07/01
 
  PO   1030
 
Olanzapine 20 MG AT BEDTIME 06/26 2100 AC 06/30
 
  PO   2039
 
 
 
Assessment
Mesha is a 63-year-old  white female with history of schizoaffective 
disorder, bipolar type she continues to require further stabilization at this 
point.
 
Diagnosis
Schizoaffective Disorder, bipolar type
Tardive dyskinesia of the oral facial muscles
Parkinson's disease
DM
Hyperlipidemia
Hypothyroidism
 
Treatment Plan
Continue current medication regimen

## 2018-07-02 VITALS — SYSTOLIC BLOOD PRESSURE: 139 MMHG | DIASTOLIC BLOOD PRESSURE: 86 MMHG

## 2018-07-02 VITALS — SYSTOLIC BLOOD PRESSURE: 117 MMHG | DIASTOLIC BLOOD PRESSURE: 65 MMHG

## 2018-07-02 VITALS — SYSTOLIC BLOOD PRESSURE: 143 MMHG | DIASTOLIC BLOOD PRESSURE: 79 MMHG

## 2018-07-02 VITALS — DIASTOLIC BLOOD PRESSURE: 73 MMHG | SYSTOLIC BLOOD PRESSURE: 130 MMHG

## 2018-07-02 NOTE — SOCIAL WORKER PROG NOTE PSYCH
Social Work Progress Note
Progress Note
Mesha attended groups today.  I told her that after discussion with the team we 
feel she needs a longer hospitalization due to not having a stable d/c plan and 
continued symptoms.  She said she didn't feel quite ready to leave yet either 
and was willing to sign in voluntarily to the unit vs. having a commitment 
hearing.  She said she is trying not to "talk" about all the Buddhism stuff 
that she had been and she is trying to work on toning things down.  I told her I
want her to talk to people, but it needs to be appropriate and not sexual in 
nature.  She proceeded to laugh and giggle and tell me that she couldn't wait to
go to her house.  She continues to say she has a limo with a .  
She signed the voluntary form.  
Called and cancelled her  Care intake scheduled for tomorrow.

## 2018-07-02 NOTE — CP SOUTH PROGRESS NOTE PSYCH
Psych (Inpt) Progress Note
Progress Note
Mental Status Examination:
Mesha was alert and oriented to time, place, and person. 
hypersexual/grandiose delusions are unchanged, 
calm nor cooperative
She reported that her mood is very happy, and she looked elevated/expansive in 
her affect, denied thoughts of suicide, denied thoughts of violence or homicide,
 
Mesha reports multiple auditory hallucinations
Diagnoses:
Schizoaffective Disorder, bipolar type
Tardive dyskinesia of the oral facial muscles
Parkinson's disease
 
Treatment Plan update:
Increase Zyprexa to 25 mg at bedtime
Lorazepam 0.5 MG Q4 HRS AS NEEDED PRN
 
 
 
 
Diagnoses:
Schizoaffective Disorder, bipolar type
Tardive dyskinesia of the oral facial muscles
Parkinson's disease
 
Treatment Plan update:
Continue Zyprexa 20 mg at bedtime
Atorvastatin Calcium 40 MG 
Benzocaine/Menthol 1 ADRIANA Q4 HRS AS NEEDED PRN  
Levothyroxine Sodium 0.125 MG DAILY AC 
Lorazepam 0.5 MG Q4 HRS AS NEEDED PRN

## 2018-07-03 VITALS — DIASTOLIC BLOOD PRESSURE: 72 MMHG | SYSTOLIC BLOOD PRESSURE: 141 MMHG

## 2018-07-03 VITALS — SYSTOLIC BLOOD PRESSURE: 140 MMHG | DIASTOLIC BLOOD PRESSURE: 85 MMHG

## 2018-07-03 VITALS — DIASTOLIC BLOOD PRESSURE: 51 MMHG | SYSTOLIC BLOOD PRESSURE: 118 MMHG

## 2018-07-03 VITALS — SYSTOLIC BLOOD PRESSURE: 135 MMHG | DIASTOLIC BLOOD PRESSURE: 76 MMHG

## 2018-07-03 NOTE — SOCIAL WORKER PROG NOTE PSYCH
Social Work Progress Note
Progress Note
Mesha said she was feeling great.  She is very preoccupied with God right now 
and thinking that God has given her special renae to help others.  She stated 
"I'm helping the world."  She went on to talk about all the wonderful things 
that God has done, particularly giving people "lips to kiss with."  I tried to 
talk with Mesha about coming up with an alternative d/c plan other than living 
with Jarocho, but she refused stating she has a mansion/ home to go to.  I told 
her we were not in agreement for that to be the discharge plan.  She told me to 
go ahead and file for a hearing then and we will let the  decide.

## 2018-07-03 NOTE — CP SOUTH PROGRESS NOTE PSYCH
Psych (Inpt) Progress Note
Progress Note
Vital Signs
 
 
Date Time Temp Pulse B/P
 
07/03 0742 98.1 92 135/76
 
07/02 1945 98.4 98 117/65
 
07/02 1538  92 143/79
 
Mental Status Examination:
Mesha remains grandiose but less hypersexual today (7/3/2018)
calm, cooperative, reported that her mood is very happy
she looked elevated/expansive in her affect, denied thoughts of suicide, denied 
thoughts of violence or homicide, 
Mesha reports multiple auditory hallucinations
 
Diagnoses:
Schizoaffective Disorder, bipolar type
Tardive dyskinesia of the oral facial muscles
Parkinson's disease
 
Treatment Plan update:
Continue Zyprexa 25 mg at bedtime
Lorazepam was changed yesterday to 1 mg Q4 HRS AS NEEDED PRN manic behaviors or 
symptoms including insomnia
 
 
Lorazepam 0.5 mg Q4 HRS AS NEEDED PRN anxiety

## 2018-07-04 VITALS — SYSTOLIC BLOOD PRESSURE: 115 MMHG | DIASTOLIC BLOOD PRESSURE: 57 MMHG

## 2018-07-04 VITALS — DIASTOLIC BLOOD PRESSURE: 67 MMHG | SYSTOLIC BLOOD PRESSURE: 116 MMHG

## 2018-07-04 VITALS — DIASTOLIC BLOOD PRESSURE: 52 MMHG | SYSTOLIC BLOOD PRESSURE: 126 MMHG

## 2018-07-04 VITALS — SYSTOLIC BLOOD PRESSURE: 118 MMHG | DIASTOLIC BLOOD PRESSURE: 62 MMHG

## 2018-07-04 NOTE — CP SOUTH PROGRESS NOTE PSYCH
Psych (Inpt) Progress Note
Progress Note
Include the following elements, when applicable:
Involvement in the active treatment of the patient with behavioral observations 
of the patient and the patient's response to the treatment.
Review of the ongoing treatment process in the context of the treatment plan.
Indication of how multi-disciplinary staff members are carrying out the 
treatment plan.
Plans for future interventions and recommendations for revision of the treatment
plan.
Liaison with other physicians/providers.
Progress Note:
 
 Pleasant, giddy, laughint to herself at times. Stated that she feels safe on 
the unit but that people are following me and Jarocho around referring to 
people on TV. Continued in a disorganized tangent, but redirectable and able to
be interrupted. Stated that she likes the voices because they keep her company 
and in line, compared to when they were threatening in the past. Laughed about 
her relationship with Pb and stated that he nor anyone else has been coming 
to visit her. Somewhat labile and more down when discussing this. continues to 
have episodes of more disorganized, hypersexual behavior on the unit 
interspersed with some more controlled mental status. 
MSE: middle aged woman, tardive dyskinesia evident in mouth, pleasant and well 
related. She makes good eye contact. Her grooming is good. Her speech is regular
rate and volume. Her affect is full, reactive, mood is good to euphoric at 
times. Thinking is somewhat tangential but overall goal directed. Referential 
thinking about being followed around by people on TV, delusional thinking about 
her  Pb, but not as severely disorganized as she has been/can be. No 
evidence of hypersexual behavior or statements with me, though she has them 
regularly. Denies thoughts to harm self or anyone else. States that she hears 
voices which are pleasant, but not internally preoccupied. Her insight is 
impaired to severity of her residual symptoms, judgment fair to poor. some 
impairments in cognition due to severity of her symptoms. 
A: 63 year old woman with long history of psychotic illness, severe symptoms 
when decompensated, showing some stabilization. Still referential, disorganized 
and euphoric, but overall no gross behavioral disturbances and responding to 
redirection.  
Continue current plan of care. Main risk surrounds her residual psychosis which 
is being addressed with medication, groups, therapy and milieu treatment.

## 2018-07-05 VITALS — SYSTOLIC BLOOD PRESSURE: 135 MMHG | DIASTOLIC BLOOD PRESSURE: 66 MMHG

## 2018-07-05 VITALS — SYSTOLIC BLOOD PRESSURE: 115 MMHG | DIASTOLIC BLOOD PRESSURE: 90 MMHG

## 2018-07-05 VITALS — SYSTOLIC BLOOD PRESSURE: 130 MMHG | DIASTOLIC BLOOD PRESSURE: 73 MMHG

## 2018-07-05 VITALS — DIASTOLIC BLOOD PRESSURE: 78 MMHG | SYSTOLIC BLOOD PRESSURE: 141 MMHG

## 2018-07-05 NOTE — CP SOUTH PROGRESS NOTE PSYCH
Psych (Inpt) Progress Note
Progress Note
Mental Status Examination:
grandiose delusions but calm, cooperative, reported that her mood is very happy
she looked elevated/expansive in her affect, denied thoughts of suicide, denied 
thoughts of violence or homicide, 
Mesha reports multiple auditory hallucinations
 
Diagnoses:
Schizoaffective Disorder, bipolar type
Tardive dyskinesia of the oral facial muscles
 
Treatment Plan update:
Continue Zyprexa 25 mg at bedtime
Lorazepam 1 mg Q4 HRS AS NEEDED PRN manic behaviors or symptoms including 
insomnia

## 2018-07-05 NOTE — SOCIAL WORKER PROG NOTE PSYCH
Social Work Progress Note
Progress Note
Mesha continues to present with auditory hallucinations and delusions.  She is 
hearing Jarocho's voice today.  She reported that if she didn't eat breakfast he 
was going to pick her up, but she ate.  She then said she's not going to eat 
lunch so Jarocho would come.  I told her she is not being discharged and again 
reiterated the concerns over having a better living situation.  She went on 
about how her and Jarocho have to be together.  She won't allow any referrals to 
be done.  Mesha stated Jarocho visits her here on the unit nightly and that 
everyone here loves him.  She also shared that there are parties that go on at 
night, but she can't remember the details because she is hypnotized.  Mesha was 
pleading with me to go at the end of the meeting.  I told her she could file a 3
day paper to terminate her voluntary status, but either was we were filing for 
the involuntary commitment hearing.  
 
Started the paperwork for involuntary commitment and conservatorship.

## 2018-07-06 VITALS — SYSTOLIC BLOOD PRESSURE: 133 MMHG | DIASTOLIC BLOOD PRESSURE: 79 MMHG

## 2018-07-06 VITALS — DIASTOLIC BLOOD PRESSURE: 75 MMHG | SYSTOLIC BLOOD PRESSURE: 134 MMHG

## 2018-07-06 VITALS — SYSTOLIC BLOOD PRESSURE: 120 MMHG | DIASTOLIC BLOOD PRESSURE: 69 MMHG

## 2018-07-06 VITALS — DIASTOLIC BLOOD PRESSURE: 74 MMHG | SYSTOLIC BLOOD PRESSURE: 106 MMHG

## 2018-07-06 NOTE — CP SOUTH PROGRESS NOTE PSYCH
Psych (Inpt) Progress Note
Progress Note
Vital Signs
 
 
Date Time Temp Pulse B/P B/P Pulse O2 FiO2
 
07/06 1205  108 106/74    
 
07/06 0749 97.9 90 133/79    
 
1953 97.5 88 130/73    
 
07/05 1547  83 135/66    
 
 
Mental Status Examination:
Mesha reports multiple auditory hallucinations & grandiose delusions
calm, cooperative, 
reported that her mood is very happy and looked elevated/expansive in her affect
, denied thoughts of suicide, denied thoughts of violence or homicide
 
Diagnoses:
Schizoaffective Disorder, bipolar type
Tardive dyskinesia of the oral facial muscles
 
Treatment Plan update:
Continue Zyprexa 25 mg at bedtime
Lorazepam 1 mg Q4 HRS AS NEEDED PRN manic behaviors or symptoms including 
insomnia

## 2018-07-06 NOTE — SOCIAL WORKER PROG NOTE PSYCH
**See Addendum**
Social Work Progress Note
Progress Note
Involuntary Commitment paperwork was submitted to the [place] [facility] this 
morning.
 
Mesha was in bed at 10am.  We talked in her room.  She said she had a bad dream 
that Jarocho and Renee were together.  She got upset over this and apparently 
required an Ativan PRN that was causing her to feel drowsy.  As we talked she 
was opening and closing her eyes.  I asked her again if she would think about 
pursuing housing through [facility]'s respite program?  She said "no thank you."  I
told Mesha that the paperwork for the court hearing was submitted.  She asked 
about the date.  I told her there was no date determined yet and that I would 
let her know.  I also told her that we are filing again for conservatorship.  
She asked what role that person would play?  I told her that person would make 
medical decisions and decisions around housing and finances.  She worried that 
person may steal her money. She asked if she would stay here at [facility] until 
she goes somewhere else.  I told her that was the plan.

## 2018-07-07 VITALS — DIASTOLIC BLOOD PRESSURE: 88 MMHG | SYSTOLIC BLOOD PRESSURE: 134 MMHG

## 2018-07-07 VITALS — DIASTOLIC BLOOD PRESSURE: 82 MMHG | SYSTOLIC BLOOD PRESSURE: 141 MMHG

## 2018-07-07 VITALS — SYSTOLIC BLOOD PRESSURE: 133 MMHG | DIASTOLIC BLOOD PRESSURE: 78 MMHG

## 2018-07-07 VITALS — DIASTOLIC BLOOD PRESSURE: 78 MMHG | SYSTOLIC BLOOD PRESSURE: 132 MMHG

## 2018-07-07 NOTE — CP SOUTH PROGRESS NOTE PSYCH
Psych (Inpt) Progress Note
Progress Note
Vital Signs
 
 
Date Time Temp Pulse B/P B/P O2 FiO2
 
07/07 0754 97.1 96 141/82   
 
07/06 1937 97.8 104 120/69   
 
07/06 1548  89 134/75   
 
07/06 1205  108 106/74   
 
 
Mental Status Examination:
Mesha does not seem to be much different than in the previous few days.  She 
continues to be in an elevated mood and seems to enjoy multiple grandiose 
delusions 
Reports less auditory hallucinations & she was calm, cooperative, 
reported that her mood is very happy and looked elevated/expansive in her affect
, denied thoughts of suicide, denied thoughts of violence or homicide
 
Diagnoses:
Schizoaffective Disorder, bipolar type
Tardive dyskinesia of the oral facial muscles
 
Treatment Plan update:
Continue Zyprexa 25 mg at bedtime
Lorazepam 1 mg Q4 HRS AS NEEDED PRN manic behaviors or symptoms including 
insomnia

## 2018-07-08 VITALS — SYSTOLIC BLOOD PRESSURE: 120 MMHG | DIASTOLIC BLOOD PRESSURE: 72 MMHG

## 2018-07-08 VITALS — SYSTOLIC BLOOD PRESSURE: 118 MMHG | DIASTOLIC BLOOD PRESSURE: 77 MMHG

## 2018-07-08 VITALS — SYSTOLIC BLOOD PRESSURE: 114 MMHG | DIASTOLIC BLOOD PRESSURE: 66 MMHG

## 2018-07-08 VITALS — SYSTOLIC BLOOD PRESSURE: 127 MMHG | DIASTOLIC BLOOD PRESSURE: 72 MMHG

## 2018-07-08 NOTE — CP SOUTH PROGRESS NOTE PSYCH
Psych (Inpt) Progress Note
Progress Note
Vital Signs
 
 
Date Time Temp Pulse Resp B/P B/P Pulse O2 O2 Flow FiO2
 
07/08 0811 97.7 88  127/72     
 
07/07 1911 97.6 95  132/78     
 
07/07 1620  92  134/88     
 
 
 
Mental Status Examination:
Mesha refused Zyprexa last night "I don't need it"
does not seem to be much different than in the previous few days.  
continues to be in an elevated mood 
multiple grandiose delusions 
Reports less auditory hallucinations & she was calm, cooperative, 
reported that her mood is very happy and looked elevated/expansive 
denied thoughts of suicide, denied thoughts of violence or homicide
 
Diagnoses:
Schizoaffective Disorder, bipolar type
Tardive dyskinesia of the oral facial muscles
 
Treatment Plan update:
Continue the same medications
pt. strongly encouraged to adhere to the medication recommendations

## 2018-07-09 VITALS — SYSTOLIC BLOOD PRESSURE: 129 MMHG | DIASTOLIC BLOOD PRESSURE: 69 MMHG

## 2018-07-09 VITALS — DIASTOLIC BLOOD PRESSURE: 82 MMHG | SYSTOLIC BLOOD PRESSURE: 133 MMHG

## 2018-07-09 VITALS — DIASTOLIC BLOOD PRESSURE: 54 MMHG | SYSTOLIC BLOOD PRESSURE: 110 MMHG

## 2018-07-09 NOTE — SOCIAL WORKER PROG NOTE PSYCH
Social Work Progress Note
Progress Note
Mesha was in bed most of the afternoon.  I got her out of bed to meet with the 
 who served her court paperwork for the involuntary commitment hearing.  
The  told her he didn't know what it was about.  She challenged him and 
said "yes you do, your blushing."  Her and I met after that to talk about how 
the hearing is to pursue longer hospitalization.  She does not feel that is 
necessary.  She stated "people are lining the streets Virgie to see Jarocho and I."  
She went on to say that people are getting ready in the streets to see her and 
Jarocho leave the hospital and go to [place] together.  She then talked about 
her mansion in [place], her limo, and her .  I told her these are 
considered grandiose beliefs.  She giggled.  She has no insight into her illness
right now.  She does not believe she needs psychiatric medications.  She 
believes that the Zyprexa prescribed is Acetimeniphen.  She wants the hearing 
moved up and believes the 17th is too far out.  I told her that was not in our 
control and it is what the court has available.  "I have to be with Jarocho" is 
how she responded.
 
Filled out paperwork for conservatorship and petition for consent to psychiatric
medication treatment.  Will submit to court tomorrow.

## 2018-07-09 NOTE — CP SOUTH PROGRESS NOTE PSYCH
Psych (Inpt) Progress Note
Progress Note
Vital Signs
 
 
Date Time Temp Pulse B/P B/P O2 FiO2
 
07/09 0756 97.9 98 110/54   
 
07/08 1943 98.3 98 114/66   
 
07/08 1609  98 120/72   
 
No new labs in the past 24 hours
 
Mental Status Examination:
No evidence of oversedation.  Alert and oriented to time, place, and person. 
Mesha does not seem to be much different than in the previous few days.  
continues to be in an elevated mood 
multiple grandiose delusions 
Reports less auditory hallucinations & she was calm, cooperative, 
reported that her mood is very happy and looked elevated/expansive 
denied thoughts of suicide, denied thoughts of violence or homicide
Diagnoses:
Schizoaffective Disorder, bipolar type
Tardive dyskinesia of the oral facial muscles
 
Treatment Plan update:
Continue the same medications
pt. strongly encouraged to adhere to the medication recommendations

## 2018-07-10 VITALS — DIASTOLIC BLOOD PRESSURE: 75 MMHG | SYSTOLIC BLOOD PRESSURE: 115 MMHG

## 2018-07-10 VITALS — DIASTOLIC BLOOD PRESSURE: 77 MMHG | SYSTOLIC BLOOD PRESSURE: 137 MMHG

## 2018-07-10 VITALS — DIASTOLIC BLOOD PRESSURE: 73 MMHG | SYSTOLIC BLOOD PRESSURE: 134 MMHG

## 2018-07-10 VITALS — SYSTOLIC BLOOD PRESSURE: 134 MMHG | DIASTOLIC BLOOD PRESSURE: 71 MMHG

## 2018-07-10 NOTE — IP INCIDENTAL NOTE PSYCH
Incidental Note
Notation:
Patient seen for second opinion for medication power for  application.
 
64 yo DWF known to me from her prior tx here.  Admitted 6/22/18.  Carries dx 
schizoaffective d/o, bipolar type.
 
Medication list reviewed.
 
Patient seen at 10:54 a.m.  Was in group prior to meeting with me in office.  
Denies having mental illness or need for medication, except for "just vitamins, 
minerals, Os-kylie."  Denies inappropriate conversation except for "just today 
when I got mad at that [facility]."  Patient was upset about her perception of how a 
tech was feeding a patient.  She wants to take that peer into her home.  Claims 
to have a limo and a lot of money, worth close to $1 billion.  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 
making movies.  Mood: "I can't get that madness out of my head, how she treated 
him (peer)."  Denies feeling sad.  Feels angry.  Feels a little anxious about 
situation with peer.  Denies SI, HI, AH, VH and PI.  Reports magical renae: 
that her brain was programmed to help God.  Oriented to person.  Place: "the [facility]" in [place].  Date: 7/10/18.  
 
IMPRESSION:
The patient remains manic/psychotic and lacks capacity to understand the need 
for medication for her condition.

## 2018-07-10 NOTE — SOCIAL WORKER PROG NOTE PSYCH
Social Work Progress Note
Progress Note
Mesha was in her room laying down around 2:30pm.  It sounded like she was 
crying.  I asked if she was okay?  She said she was sad, because she was leaving
today and would miss everyone here.  I told her she was not leaving today and 
asked if she was feeling sad because she is still here?  She insisted that Jarocho
was picking her up at 4pm today.  She told me that her and Jarocho work for God 
and they are doing "God's work."  Her mood brightened as we talked throughout 
our conversation.  She specifically got happy talking about her limo and mansion
in [place].  She is not accepting that these are grandiose beliefs.  
She told me she got in trouble for yelling at a sitter today.  She seemed overly
involved/ concerned about how another patient was being cared for.  We talked 
about how she could go to nursing with her concerns in the future instead  of 
getting upset with the caretaker.

## 2018-07-10 NOTE — CP SOUTH PROGRESS NOTE PSYCH
Psych (Inpt) Progress Note
Progress Note
Treatment team (SCOUT RN, Group and Activities Therapist, and psychiatrist) 
discussed Pt.'s progress, treatment plan, and aftercare plans. 
 
Vital Signs
 
 
Date Time Temp Pulse Resp B/P B/P Pulse O2 O2 Flow FiO2
 
     Mean Ox Delivery Rate 
 
07/10 1610  92  134/71     
 
07/10 1257  88  134/73     
 
07/10 0830 97.2 98  137/77     
 
07/09 2001 97.7 94  129/69     
 
 
No new labs
 
Mental Status Examination:
Ms. Neri continues to have grandiose delusions 
No evidence of oversedation.  Alert and oriented to time, place, and person. She
continues to be in an elevated mood. Reports less auditory hallucinations & she 
was calm, cooperative, 
reported that her mood is very happy and looked elevated/expansive 
denied thoughts of suicide, denied thoughts of violence or homicide
no significant Parkinsonian tremors
 
Diagnoses:
Schizoaffective Disorder, bipolar type
Much improved Tardive dyskinesia of the oral facial muscles
 
Treatment Plan update:
Continue the same medications

## 2018-07-11 VITALS — DIASTOLIC BLOOD PRESSURE: 55 MMHG | SYSTOLIC BLOOD PRESSURE: 111 MMHG

## 2018-07-11 VITALS — DIASTOLIC BLOOD PRESSURE: 72 MMHG | SYSTOLIC BLOOD PRESSURE: 108 MMHG

## 2018-07-11 VITALS — SYSTOLIC BLOOD PRESSURE: 110 MMHG | DIASTOLIC BLOOD PRESSURE: 76 MMHG

## 2018-07-11 VITALS — SYSTOLIC BLOOD PRESSURE: 113 MMHG | DIASTOLIC BLOOD PRESSURE: 58 MMHG

## 2018-07-11 NOTE — SOCIAL WORKER PROG NOTE PSYCH
Naa Chelsie BUTTERFIELD 07/11/18 1055:
Social Work Progress Note
Progress Note
Left a message for Renata Hogan ([facility]) to discuss the referral to [facility].  Spoke
with Renata about Mesha's case.  She approved the referral to [facility].  The 
referral was sent.  
 
Mesha was in her room.  She started talking about orgasms.  I told her that was 
personal information and she really doesn't need to talk about that.  She said 
she had mixed feelings today of feeling happy and sad.  Happy because she is 
leaving, but sad because she will miss everyone here.  She was told again that 
she isn't leaving today.  Reminded her of the probate hearing on 7/17.  She 
stated she didn't need a hearing.  I told her we filed for a conservator to be 
appointed and explained the role of that person.  She said "Virgie you're funny" 
and then giggled.  She said she has a place to go already.

## 2018-07-11 NOTE — CP SOUTH PROGRESS NOTE PSYCH
Psych (Inpt) Progress Note
Progress Note
Treatment team (SCOUT, RN, Group and Activities Therapist, and psychiatrist) 
discussed Pt.'s progress, treatment plan, and aftercare plans. 
 
Vital Signs
 
 
Date Time Temp Pulse B/P B/P Pulse O2 FiO2
 
07/11 1239  91 108/72    
 
07/11 0747 97.0 97 110/76    
 
07/10 2021 97.8 96 115/75    
 
No new labs
 
Mental Status Examination:
no significant Parkinsonian tremors
The patient continues to have grandiose delusions 
The patient did not seem over sedated this morning, she reported that there is a
new brown hairs growing she thinks it is from God (she reported "my natural hair
is brown").  
Alert and oriented to time, place, and person. She continues to be in an 
elevated mood. Reports less auditory hallucinations & she was calm, cooperative,
 
mood is "very happy" and affect was goal congruent as she looked elevated/
expansive 
denied thoughts of suicide, denied thoughts of violence or homicide
 
Diagnoses:
Schizoaffective Disorder, bipolar type
Much improved Tardive dyskinesia of the oral facial muscles
 
Treatment Plan update:
Will break the dose of Zyprexa to 2 2 doses 10 mg in the morning and 15 at 
bedtime because she has been suffering with restless legs at night and even leg 
cramps he said
Also make Klonopin regular schedule at bedtime to help with that

## 2018-07-11 NOTE — SOCIAL WORKER PROG NOTE PSYCH
Social Work Progress Note
Progress Note
[facility]. Asked Virgie ROSAS to review prior to faxing.

## 2018-07-12 VITALS — DIASTOLIC BLOOD PRESSURE: 53 MMHG | SYSTOLIC BLOOD PRESSURE: 134 MMHG

## 2018-07-12 VITALS — SYSTOLIC BLOOD PRESSURE: 135 MMHG | DIASTOLIC BLOOD PRESSURE: 53 MMHG

## 2018-07-12 VITALS — SYSTOLIC BLOOD PRESSURE: 138 MMHG | DIASTOLIC BLOOD PRESSURE: 80 MMHG

## 2018-07-12 VITALS — SYSTOLIC BLOOD PRESSURE: 109 MMHG | DIASTOLIC BLOOD PRESSURE: 61 MMHG

## 2018-07-12 NOTE — SOCIAL WORKER PROG NOTE PSYCH
Social Work Progress Note
Progress Note
Mesha was laying in bed.  She shared that Dr. Gharaibeh told her she isn't going
home today.  I told her that was correct and that we are having the hearing on 7
/17 and the  will decide.  She talked about feeling sad last night and more
so today.  She was religiously preoccupied with God and talked about God being 
angry about things.  She continues to focus on people are other people and how 
they are mistreating God's resources.  Encouraged her to get socks on her feet 
so she didn't hurt her feet.  Reminded her that she should take care of her feet
especially because she is Diabetic.  She denied being a Diabetic.

## 2018-07-12 NOTE — CP SOUTH PROGRESS NOTE PSYCH
Psych (Inpt) Progress Note
Progress Note
Treatment team (SCOUT, RN, Group and Activities Therapist, and psychiatrist) 
discussed Pt.'s progress, treatment plan, and aftercare plans. 
 
Mental Status and Behaviors:
continues to have grandiose delusions 
Reports less auditory hallucinations 
The patient did not seem over sedated this morning, Alert and oriented to time, 
place, and person. She continues to be in an elevated mood. & she was calm, 
cooperative, 
mood is "very happy" and affect was goal congruent as she looked elevated/
expansive 
denied thoughts of suicide, denied thoughts of violence or homicide
 
Treatment Plan update:
Continue Zyprexa 10 mg in the morning and 15 at bedtime because 
Continue Klonopin 1 mg at bedtime
Clonazepam 0.5 MG Q6-PRN PRN
 
 
 
 
 
 
Diagnoses:
Schizoaffective Disorder, bipolar type
Much improved Tardive dyskinesia of the oral facial muscles
 
Treatment Plan update:
Will break the dose of Zyprexa to 2 2 doses 10 mg in the morning and 15 at 
bedtime because she has been suffering with restless legs at night and even leg 
cramps he said
Also make Klonopin regular schedule at bedtime to help with that

## 2018-07-13 VITALS — DIASTOLIC BLOOD PRESSURE: 70 MMHG | SYSTOLIC BLOOD PRESSURE: 136 MMHG

## 2018-07-13 VITALS — DIASTOLIC BLOOD PRESSURE: 70 MMHG | SYSTOLIC BLOOD PRESSURE: 134 MMHG

## 2018-07-13 VITALS — SYSTOLIC BLOOD PRESSURE: 130 MMHG | DIASTOLIC BLOOD PRESSURE: 72 MMHG

## 2018-07-13 VITALS — SYSTOLIC BLOOD PRESSURE: 115 MMHG | DIASTOLIC BLOOD PRESSURE: 65 MMHG

## 2018-07-13 NOTE — CP SOUTH PROGRESS NOTE PSYCH
Psych (Inpt) Progress Note
Progress Note
Treatment team (SCOUT, RN, Group and Activities Therapist, and psychiatrist) 
discussed the pt.'s progress, treatment plan, and aftercare plans. 
 
Vital Signs
 
 
Date Time Temp Pulse B/P B/P
 
07/13 0751 97.6 97 136/70 
 
07/12 1955 98.3 98 134/53 
 
 
Mental Status and Behaviors:
continues to have grandiose delusions about being on TV with Jarocho, 
we have 3 choices: stay on air, go off the air, .... or stay on air but take the
bedroom off the air
Reports less auditory hallucinations, 
Alert and oriented to time, place, and person. 
continues to be in an elevated mood. & she was calm, cooperative, 
mood is "very happy" and affect was gelevated/expansive 
denied thoughts of suicide, denied thoughts of violence or homicide
 
Treatment Plan Update:
Continue Zyprexa 10 mg in AM and 15 mg QHS
Continue Klonopin 1 mg at bedtime
Continue Clonazepam 0.5 mg Q6-PRN

## 2018-07-13 NOTE — SOCIAL WORKER PROG NOTE PSYCH
Social Work Progress Note
Progress Note
Mesha was in bed this afternoon.  She shared that she really has something she 
needs to discuss with the adelfo and was hoping we would approve a visit.  She 
didn't elude to what it was.  She was reminded that she needs to be appropriate 
with her behavior and conversations with the adelfo.  She then told me that 
this morning she was loud and interrupting so she was directed by nursing to 
take her medication.  She has been laying down due to feeling tired.  She also 
felt that she ate too much for lunch.  She told me that on Tuesday  and Jarocho
need to decide whether or not they are keeping their lives on TV.  She believes 
that her life has been filmed and it is continuing to be filmed while here in 
the hospital.  She encouraged me to wave to the camera, which I refrained from 
doing.  
Talked to the nursing staff about Mesha being permitted for a brief time limited
visit with the adelfo.

## 2018-07-14 VITALS — DIASTOLIC BLOOD PRESSURE: 69 MMHG | SYSTOLIC BLOOD PRESSURE: 126 MMHG

## 2018-07-14 VITALS — DIASTOLIC BLOOD PRESSURE: 68 MMHG | SYSTOLIC BLOOD PRESSURE: 132 MMHG

## 2018-07-14 VITALS — SYSTOLIC BLOOD PRESSURE: 115 MMHG | DIASTOLIC BLOOD PRESSURE: 79 MMHG

## 2018-07-14 VITALS — DIASTOLIC BLOOD PRESSURE: 77 MMHG | SYSTOLIC BLOOD PRESSURE: 126 MMHG

## 2018-07-14 NOTE — CP SOUTH PROGRESS NOTE PSYCH
Psych (Inpt) Progress Note
Progress Note
Include the following elements, when applicable:
Involvement in the active treatment of the patient with behavioral observations 
of the patient and the patient's response to the treatment.
Review of the ongoing treatment process in the context of the treatment plan.
Indication of how multi-disciplinary staff members are carrying out the 
treatment plan.
Plans for future interventions and recommendations for revision of the treatment
plan.
Liaison with other physicians/providers.
 
Progress Note:
Pt notes that she is "doing really well!" She spoke at length about recently (
moments before) speaking with her love, "Jarocho" (estranged ex-), in the 
room. She recounted thier relationship at length. Feels that "everything is 
beautiful." Slept well overnight. She feels that she has been doing better as 
she "doesn't but in as much." Denies SI or HI. 
 
 Current Medications
 
 
  Sig/Sharan Start time  Last
 
Medication Dose Route Stop Time Status Admin
 
Acetaminophen 650 MG Q4P PRN 06/22 1500 AC 07/10
 
  PO   2336
 
Al Hydroxide/Mg  30 ML Q4-6 PRN PRN 06/22 1500 AC 07/10
 
Hydroxide  PO   0017
 
Aspirin Buffered 81 MG DAILY 06/23 0900 AC 07/14
 
  PO   0932
 
Atorvastatin Calcium 40 MG 1700 06/22 1700 AC 07/13
 
  PO   1712
 
Calcium/Vitamin D 250 MG DAILY 06/23 0934 AC 07/14
 
  PO   0932
 
Clonazepam 1 MG AT BEDTIME 07/11 2100 AC 07/13
 
  PO 07/18 2059  2303
 
Clonazepam 0.5 MG Q6-PRN PRN 07/09 1315 AC 07/10
 
  PO 07/16 1314  2006
 
Levothyroxine Sodium 0.125 MG DAILY AC 06/23 0932 AC 07/14
 
  PO   0640
 
Magnesium Hydroxide 30 ML AT BEDTIME PRN 07/05 0800 AC 07/13
 
  PO   0612
 
Metformin HCl 500 MG 0800,1700 06/22 1700 AC 07/14
 
  PO   0932
 
Multivitamins 1 TAB DAILY 06/23 0933 AC 07/14
 
  PO   0932
 
Olanzapine 10 MG 0800 07/12 0800 AC 07/14
 
  PO   0932
 
Olanzapine 15 MG AT BEDTIME 07/11 2100 AC 07/13
 
  PO   2303
 
 
Vital Signs
 
 
Date Time Temp Pulse Resp B/P B/P Pulse O2 O2 Flow FiO2
 
     Mean Ox Delivery Rate 
 
07/14 0805 97.7 85  126/69     
 
07/13 2034 98.4 98  115/65     
 
07/13 1554  97  134/70     
 
07/13 1209  98  130/72     
 
 
 
MSE
Appearance: as stated age
Speech :  nl rate, rhythm, volume and prosody  
Behavior: cooperative
Motor:  no psychomotor agitation or retardation
Mood :  doing really well
Affect : + labile, elevated, inappropriate, constricted
Thought process:  tangential at best
Thought content : ++ delusions, no paranoia noted
Perceptions: denied AVHs, denied SI or HI
Insight: poor
Judgment:  poor
 
A/P: Pt with schizoaffective disorder, bipolar disorder with continued psychotic
and mood sx overall though improved per staff and pt report. 
 
Continue current medication regimen
Encourage integration into the milieu

## 2018-07-15 VITALS — SYSTOLIC BLOOD PRESSURE: 140 MMHG | DIASTOLIC BLOOD PRESSURE: 84 MMHG

## 2018-07-15 VITALS — DIASTOLIC BLOOD PRESSURE: 70 MMHG | SYSTOLIC BLOOD PRESSURE: 134 MMHG

## 2018-07-15 VITALS — DIASTOLIC BLOOD PRESSURE: 61 MMHG | SYSTOLIC BLOOD PRESSURE: 129 MMHG

## 2018-07-15 VITALS — DIASTOLIC BLOOD PRESSURE: 81 MMHG | SYSTOLIC BLOOD PRESSURE: 139 MMHG

## 2018-07-15 NOTE — CP SOUTH PROGRESS NOTE PSYCH
Psych (Inpt) Progress Note
Progress Note
Include the following elements, when applicable:
Involvement in the active treatment of the patient with behavioral observations 
of the patient and the patient's response to the treatment.
Review of the ongoing treatment process in the context of the treatment plan.
Indication of how multi-disciplinary staff members are carrying out the 
treatment plan.
Plans for future interventions and recommendations for revision of the treatment
plan.
Liaison with other physicians/providers.
Progress Note:
Pt spoke at length about the blankets and asked several times to be tucked in. 
Denies SI or HI. Extremely tangential and not redirectable. 
 
 Current Medications
 
 
  Sig/Sharan Start time  Last
 
Medication Dose Route Stop Time Status Admin
 
Acetaminophen 650 MG Q4P PRN 06/22 1500 AC 07/10
 
  PO   2336
 
Al Hydroxide/Mg  30 ML Q4-6 PRN PRN 06/22 1500 AC 07/10
 
Hydroxide  PO   0017
 
Aspirin Buffered 81 MG DAILY 06/23 0900 AC 07/15
 
  PO   0747
 
Atorvastatin Calcium 40 MG 1700 06/22 1700 AC 07/14
 
  PO   1706
 
Calcium/Vitamin D 250 MG DAILY 06/23 0934 AC 07/15
 
  PO   0747
 
Clonazepam 1 MG AT BEDTIME 07/11 2100 AC 07/14
 
  PO 07/18 2059  2126
 
Clonazepam 0.5 MG Q6-PRN PRN 07/09 1315 AC 07/15
 
  PO 07/16 1314  1055
 
Levothyroxine Sodium 0.125 MG DAILY AC 06/23 0932 AC 07/15
 
  PO   0648
 
Magnesium Hydroxide 30 ML AT BEDTIME PRN 07/05 0800 AC 07/13
 
  PO   0612
 
Metformin HCl 500 MG 0800,1700 06/22 1700 AC 07/15
 
  PO   0747
 
Multivitamins 1 TAB DAILY 06/23 0933 AC 07/15
 
  PO   0747
 
Olanzapine 10 MG 0800 07/12 0800 AC 07/15
 
  PO   0747
 
Olanzapine 15 MG AT BEDTIME 07/11 2100 AC 07/14
 
  PO   2126
 
 
Vital Signs
 
 
Date Time Temp Pulse Resp B/P B/P Pulse O2 O2 Flow FiO2
 
     Mean Ox Delivery Rate 
 
07/15 0745 97.6 91  140/84     
 
07/14 1957 98.2 98  115/79     
 
07/14 1602  92  132/68     
 
07/14 1230  82  126/77     
 
 
 
MSE
Appearance: as stated age
Speech :  nl rate, rhythm, volume and prosody  
Behavior: cooperative
Motor:  no psychomotor agitation or retardation
Mood : pull my blanket, please
Affect : + labile, elevated, inappropriate, constricted
Thought process:  tangential at best
Thought content : ++ delusions, no paranoia noted
Perceptions: denied AVHs, denied SI or HI
Insight: poor
Judgment:  poor
 
A/P: Pt with schizoaffective disorder, bipolar disorder with continued psychotic
and mood sx overall though improved per staff and pt report. 
 
Continue current medication regimen
Encourage integration into the milieu

## 2018-07-16 VITALS — SYSTOLIC BLOOD PRESSURE: 133 MMHG | DIASTOLIC BLOOD PRESSURE: 82 MMHG

## 2018-07-16 VITALS — DIASTOLIC BLOOD PRESSURE: 70 MMHG | SYSTOLIC BLOOD PRESSURE: 131 MMHG

## 2018-07-16 VITALS — SYSTOLIC BLOOD PRESSURE: 116 MMHG | DIASTOLIC BLOOD PRESSURE: 78 MMHG

## 2018-07-16 VITALS — DIASTOLIC BLOOD PRESSURE: 74 MMHG | SYSTOLIC BLOOD PRESSURE: 137 MMHG

## 2018-07-16 NOTE — SOCIAL WORKER PROG NOTE PSYCH
Social Work Progress Note
Progress Note
Mesha was in her room sleeping this morning.  She woke to talk.  She reports she
hasn't spoken with Jarocho in 2 days, but he is coming here tomorrow at 10am to 
pick her up.  Reminded her of the hearing tomorrow at 2:45.  She said "no it has
to be 10am."  I told her that the court picks the time and it can't change.  She
was more irritated and getting angry.  She was making verbal threats in regard 
to "keeping her here" and that God is going to punish us and make us sick.  I 
emphathized that she was feeling angry.  She stated "you told me I could go."  I
reiterated that I have never told her she was discharging and that I have voiced
my concerns about her d/c plan from the beginning of this admission.

## 2018-07-16 NOTE — CP SOUTH PROGRESS NOTE PSYCH
Psych (Inpt) Progress Note
Progress Note
I reviewed Dr. Maynard's notes for Sat and Sund July 14 and 15, 2018
 
Treatment team (SCOUT, RN, Group and Activities Therapist, and psychiatrist) 
discussed the pt.'s progress, treatment plan, and aftercare plans. 
 
Mental Status and Behaviors:
continues to have grandiose delusions  and auditory hallucinations, Alert and 
oriented to time, place, and person. continues to be in an elevated mood. & she 
was calm, cooperative, 
denied feeling depressed, elevated/expansive affect, denied thoughts of suicide,
denied thoughts of violence or homicide
 
Treatment Plan Update:
Continue Zyprexa 10 mg in AM and 15 mg QHS
Continue Klonopin 1 mg at bedtime
Continue Clonazepam 0.5 mg Q6-PRN 
 
 
 
 
 
Continue Clonazepam 0.5 mg Q6-PRN

## 2018-07-17 VITALS — DIASTOLIC BLOOD PRESSURE: 76 MMHG | SYSTOLIC BLOOD PRESSURE: 122 MMHG

## 2018-07-17 VITALS — DIASTOLIC BLOOD PRESSURE: 71 MMHG | SYSTOLIC BLOOD PRESSURE: 131 MMHG

## 2018-07-17 VITALS — DIASTOLIC BLOOD PRESSURE: 74 MMHG | SYSTOLIC BLOOD PRESSURE: 134 MMHG

## 2018-07-17 VITALS — DIASTOLIC BLOOD PRESSURE: 70 MMHG | SYSTOLIC BLOOD PRESSURE: 131 MMHG

## 2018-07-17 NOTE — CP SOUTH PROGRESS NOTE PSYCH
Psych (Inpt) Progress Note
Progress Note
Treatment team (SCOUT, RN, Group and Activities Therapist, and psychiatrist) 
discussed the pt.'s progress, treatment plan, and aftercare plans. 
 
Mental Status and Behaviors:
continues to voice grandiose delusions, auditory hallucinations, and impaired 
judgement
She is alert and oriented to time, place, and person. 
She continues to be in an elevated mood. & she was calm, cooperative, 
denied feeling depressed, elevated/expansive affect, denied thoughts of suicide,
denied thoughts of violence or homicide
 
Treatment Plan Update:
Continue Zyprexa 10 mg in AM and 15 mg QHS
Continue Klonopin 1 mg at bedtime
Continue Clonazepam 0.5 mg Q6-PRN anxiety or agitation/insomnia

## 2018-07-17 NOTE — SOCIAL WORKER PROG NOTE PSYCH
Social Work Progress Note
Progress Note
Mesha shared that she talked to her  (Ray) and she is willing to stay 
here a couple of more weeks and then she will discharge to her mansion with 
Jarocho.  I told Mesha that is not the d/c plan that we feel is appropriate and 
that we would discuss it at the hearing.  Dr. Gharaibeh testified at the hearing
as to why Mesha needs commitment to a state facility.  Mesha spoke on her 
behalf.  She presented as delusional and disorganized.  The  granted 
involuntary commitment to a state facility.  Mesha asked if she could remain at 
[facility]?  Mesha was told that she is being referred to [facility], but she will wait 
here.  She was pleased with that and said she likes being at [facility].
 
Conservatorship hearing is scheduled for 7/31 at 10:30am here at [facility].

## 2018-07-18 VITALS — SYSTOLIC BLOOD PRESSURE: 137 MMHG | DIASTOLIC BLOOD PRESSURE: 78 MMHG

## 2018-07-18 VITALS — SYSTOLIC BLOOD PRESSURE: 128 MMHG | DIASTOLIC BLOOD PRESSURE: 80 MMHG

## 2018-07-18 VITALS — DIASTOLIC BLOOD PRESSURE: 69 MMHG | SYSTOLIC BLOOD PRESSURE: 125 MMHG

## 2018-07-18 VITALS — SYSTOLIC BLOOD PRESSURE: 124 MMHG | DIASTOLIC BLOOD PRESSURE: 66 MMHG

## 2018-07-18 NOTE — CP SOUTH PROGRESS NOTE PSYCH
Psych (Inpt) Progress Note
Progress Note
Treatment team (SCOUT, RN, Group and Activities Therapist, and psychiatrist) 
discussed the pt.'s progress, treatment plan, and aftercare plans. 
Vital Signs
 
 
Date Time Temp Pulse Resp B/P B/P Pulse O2 O2 Flow FiO2
 
07/18 1150  92  128/80     
 
07/18 0815 98.6 96  137/78     
 
07/17 2009 97.8 94  131/70     
 
07/17 1619  96  134/74     
 
 
 
Mental Status and Behaviors:
Mesha was committed involuntarily to a state facility yesterday and probate 
Court hearing
She took it rather well
continues to voice grandiose delusions, auditory hallucinations, 
She is alert and oriented to time, place, and person. 
She continues to be in an elevated mood. & she was calm, cooperative, 
denied feeling depressed, elevated/expansive affect, denied thoughts of suicide,
denied thoughts of violence or homicide
 
Treatment Plan Update:
Continue Zyprexa 10 mg in AM and 15 mg QHS
Continue Klonopin 1 mg at bedtime
Continue Clonazepam 0.5 mg Q6-PRN anxiety or agitation/insomnia

## 2018-07-18 NOTE — SOCIAL WORKER PROG NOTE PSYCH
Social Work Progress Note
Progress Note
Received a message that Mesha's conservator if assigned would be Viviana Hernandez.  
Received a call from Mesha's Sister Meme.  I gave her an update on the hearing 
yesterday and what the plan is.  She apologized for not being able to be as 
active in Mesha's tx as she would like, but she is frustrated with the years she
has tried helping Mesha and doesn't have that time and energy anymore.  She 
would be happy to connect with the conservator once assigned.  I told her when 
the hearing would be.  I was not able to meet with Mesha face to face today.

## 2018-07-19 VITALS — DIASTOLIC BLOOD PRESSURE: 70 MMHG | SYSTOLIC BLOOD PRESSURE: 139 MMHG

## 2018-07-19 VITALS — DIASTOLIC BLOOD PRESSURE: 88 MMHG | SYSTOLIC BLOOD PRESSURE: 122 MMHG

## 2018-07-19 VITALS — SYSTOLIC BLOOD PRESSURE: 122 MMHG | DIASTOLIC BLOOD PRESSURE: 79 MMHG

## 2018-07-19 VITALS — DIASTOLIC BLOOD PRESSURE: 67 MMHG | SYSTOLIC BLOOD PRESSURE: 130 MMHG

## 2018-07-19 NOTE — CP SOUTH PROGRESS NOTE PSYCH
Psych (Inpt) Progress Note
Progress Note
Treatment team (SCOUT, RN, Group and Activities Therapist, and psychiatrist) 
discussed the pt.'s progress, treatment plan, and aftercare plans. 
 
Mental Status and Behaviors:
Mesha continues to voice grandiose delusions
She has auditory hallucinations, 
She continues to be in an elevated mood. & she was calm, cooperative, 
denied feeling depressed, elevated/expansive affect, denied thoughts of suicide,
denied thoughts of violence or homicide
She is alert and oriented to time, place, and person. 
 
Treatment Plan Update:
Pt. refused to consider Clozapine, refused to re-consider Lithium
Reduce Zyprexa to 10 mg BID
Start Geodon 40 mg after breakfast
Continue Klonopin 1 mg at bedtime
Continue Clonazepam 0.5 mg Q6-PRN anxiety or agitation/insomnia

## 2018-07-19 NOTE — SOCIAL WORKER PROG NOTE PSYCH
Social Work Progress Note
Progress Note
Mesha was served the paperwork around the [employer] hearing today.  I saw 
her in her room a short while later.  She wanted to comment on the 's 
colgone and how good he smelled.  I asked if she had any questions about the 
hearing?  She asked what the role of the conservator would be?  I explained it 
was to help make financial decisions and medical decisions.  I told her the date
of the hearing.  She was disappointed that it was the 31st.  She seems to think 
that she would be able to d/c after that.  I told her that the plan is to go to 
[facility].  She continues to talk about her delusions of money and how she has a 
tons of it.  She wants to give it away to various people and charities.  I told 
her she has a good heart.  She continues to talk about how her and Jarocho were 
sent by God and that she is receiving messages from God about things she has to 
help with.  She is focused on conservation and saving horses.  
 
Called [place] [address] [facility] and spoke with Bonnie Srivastava ().  Informed her that 
the [employer] hearing is so that Mesha could be put on Clozaril.

## 2018-07-20 VITALS — SYSTOLIC BLOOD PRESSURE: 140 MMHG | DIASTOLIC BLOOD PRESSURE: 62 MMHG

## 2018-07-20 VITALS — SYSTOLIC BLOOD PRESSURE: 130 MMHG | DIASTOLIC BLOOD PRESSURE: 75 MMHG

## 2018-07-20 VITALS — DIASTOLIC BLOOD PRESSURE: 71 MMHG | SYSTOLIC BLOOD PRESSURE: 135 MMHG

## 2018-07-20 VITALS — SYSTOLIC BLOOD PRESSURE: 143 MMHG | DIASTOLIC BLOOD PRESSURE: 84 MMHG

## 2018-07-20 NOTE — CP SOUTH PROGRESS NOTE PSYCH
Psych (Inpt) Progress Note
Progress Note
Treatment team (SCOUT, RN, Group and Activities Therapist, and Psychiatrist) 
discussed the pt.'s progress, treatment plan, and aftercare plans. 
 
Vital Signs
 
 
Date Time Temp Pulse B/P B/P
 
07/20 0739 97.1 99 140/62 
 
07/19 1947 98.1 100 139/70 
 
 
Mental Status:
The pt.'s mental state remains the same, she is calm and friendly, she is 
talkative and pleasant
Pt.'s grandiose delusions are about the same, she continues to have non-
threatening voices
She remains coherent with an occasional tangent
Pt. describes her mood as "very happy" & her affect is congruent with that 
assertion (as she seems elated/elevated and expansive at times)
She denied feeling hopeless, denied thoughts of suicide, denied thoughts of 
violence or homicide
She is alert and oriented to time, place, and person.
 
Assessment:
Mesha Neri is a 63-year-old  White Woman who returned to [facility] approximately 3 weeks after her last 
discharge.
Mesha came back in what seemed to be "more delusional than usual" state of mind 
and some thought disorder
Mesha continues to have grandiose delusions but no significant thought disorder 
and she is in good behavioral control, calm and pleasant.
 
Treatment Plan Update:
Pt. refuses to consider Clozapine, Lithium, or other options because she 
believes she does not need medications (not even metformin or synthroid)
I will start the slow process of trying to cross titrate from Zyprexa to geodon 
as she is very upset with the weight gain (and she is also diabetic) but had 
severe parkinsonia and TD with high potency neuroleptics
Continue Zyprexa 10 mg BID
Continue Geodon 40 mg with breakfast
Continue Klonopin 1 mg at bedtime
Continue Clonazepam 0.5 mg Q6-PRN anxiety or agitation/insomnia

## 2018-07-20 NOTE — SOCIAL WORKER PROG NOTE PSYCH
Social Work Progress Note
Progress Note
Mesha was in her room resting at 10:30am.  Asked her why she was in bed and not 
going to group?  She reported feeling tired.  She couldn't tell me is she slept 
last night.  She denied feeling depressed.  Expressed how excited she was about 
speaking with her daughter (Jennifer) this morning.  She then made a delusional 
comment about how her and Jennifer have houses next to eachother "like [place] 
[employer]."  Mesha started talking about her thyroid this morning.  Appears to 
have delusions around that and not having Diabetes.  She says staff here are 
giving her "vitamins."  She doesn't need meds for anything else because there is
nothing wrong with her.  When asked if she would consider Clozaril, she stated 
she is tired of people asking her that and she doesn't have time to do bloodwork
weekly, because her and Jarocho will be traveling all over the world.  Encouraged 
her to get out of bed and be present on the milieu awhile.
 
Called [facility] admissions and asked what the waiting list looks like.  I was told 
that there are about 10 people on the list.  They are looking for a clinical 
update next Tuesday.

## 2018-07-21 VITALS — DIASTOLIC BLOOD PRESSURE: 76 MMHG | SYSTOLIC BLOOD PRESSURE: 127 MMHG

## 2018-07-21 VITALS — SYSTOLIC BLOOD PRESSURE: 120 MMHG | DIASTOLIC BLOOD PRESSURE: 75 MMHG

## 2018-07-21 VITALS — SYSTOLIC BLOOD PRESSURE: 122 MMHG | DIASTOLIC BLOOD PRESSURE: 72 MMHG

## 2018-07-21 VITALS — DIASTOLIC BLOOD PRESSURE: 74 MMHG | SYSTOLIC BLOOD PRESSURE: 141 MMHG

## 2018-07-21 NOTE — CP SOUTH PROGRESS NOTE PSYCH
Psych (Inpt) Progress Note
Progress Note
Pt notes that she is good today. Spoke at length about bad dream she had last 
night in which her love interest, Jarocho, her exhusband,  someone else and
made her the maid. She then went back to sleep to "dream a new ending" and 
dreamt that she  this man instead. She denies SI or HI. 
 
 Current Medications
 
 
  Sig/Sharan Start time  Last
 
Medication Dose Route Stop Time Status Admin
 
Acetaminophen 650 MG Q4P PRN 06/22 1500 AC 07/20
 
  PO   2353
 
Al Hydroxide/Mg  30 ML .STK-MED ONE 07/20 1542 DC 
 
Hydroxide  PO 07/20 1543  
 
Al Hydroxide/Mg  30 ML Q4-6 PRN PRN 06/22 1500 AC 07/20
 
Hydroxide  PO   1543
 
Aspirin Buffered 81 MG DAILY 06/23 0900 AC 07/21
 
  PO   0811
 
Atorvastatin Calcium 40 MG 1700 06/22 1700 AC 07/20
 
  PO   1656
 
Calcium/Vitamin D 250 MG DAILY 06/23 0934 AC 07/21
 
  PO   0811
 
Clonazepam 1 MG AT BEDTIME 07/11 2100 AC 07/20
 
  PO 07/25 2058 2128
 
Cyanocobalamin 250 MCG DAILY 07/19 1415 AC 07/21
 
  PO   0811
 
Levothyroxine Sodium 0.125 MG DAILY AC 06/23 0932 AC 07/21
 
  PO   0631
 
Magnesium Hydroxide 30 ML AT BEDTIME PRN 07/05 0800 AC 07/17
 
  PO   2206
 
Metformin HCl 500 MG 0800,1700 06/22 1700 AC 07/21
 
  PO   0811
 
Multivitamins 1 TAB DAILY 06/23 0933 AC 07/21
 
  PO   0811
 
Olanzapine 10 MG AT BEDTIME 07/19 2100 AC 07/20
 
  PO   2128
 
Olanzapine 10 MG 0800 07/12 0800 AC 07/21
 
  PO   0811
 
Ziprasidone 40 MG 0900 07/20 0900 AC 07/21
 
  PO   0811
 
 
Vital Signs
 
 
Date Time Temp Pulse Resp B/P B/P Pulse O2 O2 Flow FiO2
 
     Mean Ox Delivery Rate 
 
07/21 0808 97.5 90  127/76     
 
07/20 1944 98.5 99  135/71     
 
07/20 1616  100  130/75     
 
07/20 1212  90  143/84     
 
 
MSE
Appearance: as stated age
Speech :  nl rate, rhythm, volume and prosody  
Behavior: cooperative
Motor:  no psychomotor agitation or retardation
Mood : good
Affect : flat, non-labile, appropriate, constricted
Thought process:  linear and goal directed
Thought content : ++delusion
Perceptions: denied AVHs, denied SI or HI
Insight: poor
Judgment:  poor
 
A/P: Pt with hx of schizoaffective disorder with continued psychotic sx though 
improved from when last seen. 
 
Continue current medication regimen
Encourage integration into the milieu

## 2018-07-21 NOTE — CP SOUTH PROGRESS NOTE PSYCH
**See Addendum**
Psych (Inpt) Progress Note
Progress Note
Pt notes that she is "fine." She spoke at length about "a do not like chemical 
in my body that are not supposed to be there," when attempted to ascertain the 
etiology of her resistance to meds. She feels as though she is being unfairly 
kept at [facility], "because I had a bad day." She feels that being labelled "a mental 
patient" as biased family, friends, and providers against her. She denies SI or 
HI. Family may visit tomorrow. Did take meds today. 
 
 Current Medications
 
 
  Sig/Sharan Start time  Last
 
Medication Dose Route Stop Time Status Admin
 
Acetaminophen 650 MG Q4P PRN 06/22 1500 AC 07/20
 
  PO   2353
 
Al Hydroxide/Mg  30 ML .STK-MED ONE 07/20 1542 DC 
 
Hydroxide  PO 07/20 1543  
 
Al Hydroxide/Mg  30 ML Q4-6 PRN PRN 06/22 1500 AC 07/20
 
Hydroxide  PO   1543
 
Aspirin Buffered 81 MG DAILY 06/23 0900 AC 07/21
 
  PO   0811
 
Atorvastatin Calcium 40 MG 1700 06/22 1700 AC 07/20
 
  PO   1656
 
Calcium/Vitamin D 250 MG DAILY 06/23 0934 AC 07/21
 
  PO   0811
 
Clonazepam 1 MG AT BEDTIME 07/11 2100 AC 07/20
 
  PO 07/25 2058 2128
 
Cyanocobalamin 250 MCG DAILY 07/19 1415 AC 07/21
 
  PO   0811
 
Levothyroxine Sodium 0.125 MG DAILY AC 06/23 0932 AC 07/21
 
  PO   0631
 
Magnesium Hydroxide 30 ML AT BEDTIME PRN 07/05 0800 AC 07/17
 
  PO   2206
 
Metformin HCl 500 MG 0800,1700 06/22 1700 AC 07/21
 
  PO   0811
 
Multivitamins 1 TAB DAILY 06/23 0933 AC 07/21
 
  PO   0811
 
Olanzapine 10 MG AT BEDTIME 07/19 2100 AC 07/20
 
  PO   2128
 
Olanzapine 10 MG 0800 07/12 0800 AC 07/21
 
  PO   0811
 
Ziprasidone 40 MG 0900 07/20 0900 AC 07/21
 
  PO   0811
 
 
Vital Signs
 
 
Date Time Temp Pulse Resp B/P B/P Pulse O2 O2 Flow FiO2
 
     Mean Ox Delivery Rate 
 
07/21 0808 97.5 90  127/76     
 
07/20 1944 98.5 99  135/71     
 
07/20 1616  100  130/75     
 
07/20 1212  90  143/84     
 
 
 
MSE
Appearance: as stated age
Speech :  nl rate, rhythm, volume and prosody  
Behavior: cooperative
Motor:  no psychomotor agitation or retardation
Mood : fine
Affect : very flat, non-labile, irritable, appropriate, constricted
Thought process:  linear and goal directed
Thought content : no delusions or paranoia, ?RTIS
Perceptions: denied AVHs, denied SI or HI
Insight: poor
Judgment:  poor
 
A/P:Pt wiht hx of schizophrenia with hx of medication noncompliance with limited
compliance while hospitalized. Remains very resistent to meds. 
 
Continue current medication regimen
Encourage integration into the milieu

## 2018-07-22 VITALS — DIASTOLIC BLOOD PRESSURE: 69 MMHG | SYSTOLIC BLOOD PRESSURE: 133 MMHG

## 2018-07-22 VITALS — DIASTOLIC BLOOD PRESSURE: 64 MMHG | SYSTOLIC BLOOD PRESSURE: 105 MMHG

## 2018-07-22 VITALS — DIASTOLIC BLOOD PRESSURE: 74 MMHG | SYSTOLIC BLOOD PRESSURE: 128 MMHG

## 2018-07-22 VITALS — DIASTOLIC BLOOD PRESSURE: 77 MMHG | SYSTOLIC BLOOD PRESSURE: 137 MMHG

## 2018-07-23 VITALS — DIASTOLIC BLOOD PRESSURE: 59 MMHG | SYSTOLIC BLOOD PRESSURE: 147 MMHG

## 2018-07-23 VITALS — DIASTOLIC BLOOD PRESSURE: 79 MMHG | SYSTOLIC BLOOD PRESSURE: 135 MMHG

## 2018-07-23 VITALS — SYSTOLIC BLOOD PRESSURE: 108 MMHG | DIASTOLIC BLOOD PRESSURE: 65 MMHG

## 2018-07-23 VITALS — DIASTOLIC BLOOD PRESSURE: 67 MMHG | SYSTOLIC BLOOD PRESSURE: 135 MMHG

## 2018-07-23 NOTE — SOCIAL WORKER PROG NOTE PSYCH
Social Work Progress Note
Progress Note
Mesha shared that she spoke with her Sister Meme over the weekend and that they 
were hoping that a family  would be her conservator.  She believes they 
only want that because they want all of her money.  She doesn't believe that 
they have her "best interest at heart."  Vashti stated that the date of the 
conservator hearing is the day that her and Jarocho first met.  She shared 
memories of that day and her wedding day.  Stated that she will be discharged if
Jarocho comes and shows paperwork that they have housing.

## 2018-07-23 NOTE — CP SOUTH PROGRESS NOTE PSYCH
Psych (Inpt) Progress Note
Progress Note
I reviewed the notes of Dr. Maynard from the weekend of July 21 and 22, 2018.
 
SCOUT, RN, Group and Activities Therapist, and Psychiatrist discussed pt.'s 
progress, treatment plan, and aftercare plans. 
 
Vital Signs
 
 
Date Time Temp Pulse B/P B/P
 
07/23 1202  95 135/79 
 
07/23 0750 97.2 89 135/67 
 
07/22 1942 97.7 96 133/69 
 
 
MSE
calm and friendly, talkative and pleasant, grandiose delusions are about the 
same, she continues to have non-threatening voices
She remains coherent with an occasional tangent
mood is "very happy" & her affect is elated/elevated and expansive 
She denied feeling hopeless, denied thoughts of suicide, denied thoughts of 
violence or homicide
She is alert and oriented to time, place, and person.
 
Assessment:
Mesha Neri is a 63-year-old  White Woman who has been at [facility]'s Inpatient Psychiatric Unit since 6/22/2018.
Mesha continues to have grandiose delusions but no significant thought disorder 
and she is in good behavioral control, calm and pleasant.
 
Treatment Plan Update:
Reduce Zyprexa to 5 mg in AM and 10 mg QHS
Increase Geodon to 40 mg with breakfast and with Supper
Continue Klonopin 1 mg at bedtime
Continue Clonazepam 0.5 mg Q6-PRN anxiety or agitation/insomnia

## 2018-07-24 VITALS — SYSTOLIC BLOOD PRESSURE: 117 MMHG | DIASTOLIC BLOOD PRESSURE: 73 MMHG

## 2018-07-24 VITALS — SYSTOLIC BLOOD PRESSURE: 112 MMHG | DIASTOLIC BLOOD PRESSURE: 77 MMHG

## 2018-07-24 NOTE — SOCIAL WORKER PROG NOTE PSYCH
Social Work Progress Note
Progress Note
Mesha remains paranoid about her Sister Meme wanting her money.  Continues to 
report that she has millions of dollars and she will be getting it all after her
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  She believes she is being filmed today.  
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  She talked about her and Jarocho 
traveling the world and helping others.  Talked about [facility] 
[facility] on 7/31 and that there will be another hearing to discuss medication.  
Asked again if she wants to consider a trial of Clozaril?  She said there is 
nothing wrong with her and she doesn't need to be in the hospital or take 
Clozaril.

## 2018-07-24 NOTE — SOCIAL WORKER PROG NOTE PSYCH
Social Work Progress Note
Progress Note
Faxed update clinical info to [facility] [phone].

## 2018-07-24 NOTE — CP SOUTH PROGRESS NOTE PSYCH
Psych (Inpt) Progress Note
Progress Note
MELIDAW, RN, OTR/L, Group and Activities Therapist, and Psychiatrist discussed pt.'
s progress, inpatient treatment plan, and aftercare plans.  
 
MSE:
The pt. was calm and friendly, talkative and pleasant, grandiose delusions are 
about the same, she continues to have non-threatening voices. She remains 
coherent with an occasional tangent
mood is "very happy" & her affect is elated/elevated and expansive. She denied 
feeling hopeless, denied thoughts of suicide, denied thoughts of violence or 
homicide. She is alert and oriented to time, place, and person.
 
Assessment:
Mesha Neri is a 63-year-old  White Woman who has been at [facility]'s Inpatient Psychiatric Unit since 6/22/2018.
Mesha continues to have grandiose delusions but no significant thought disorder 
and she is in good behavioral control, calm and pleasant.
 
Treatment Plan Update:
Continue Zyprexa 5 mg in AM and 10 mg QHS
Increase Geodon to 40 mg with breakfast and with Supper
Reduce Klonopin to 0.5 mg at bedtime
Continue Clonazepam 0.5 mg Q6-PRN anxiety or agitation/insomnia
 
 
 
Reduce Zyprexa to 5 mg in AM and 10 mg QHS
Increase Geodon to 40 mg with breakfast and with Supper
Continue Klonopin 1 mg at bedtime
Continue Clonazepam 0.5 mg Q6-PRN anxiety or agitation/insomnia

## 2018-07-25 VITALS — SYSTOLIC BLOOD PRESSURE: 124 MMHG | DIASTOLIC BLOOD PRESSURE: 76 MMHG

## 2018-07-25 VITALS — SYSTOLIC BLOOD PRESSURE: 97 MMHG | DIASTOLIC BLOOD PRESSURE: 71 MMHG

## 2018-07-25 NOTE — SOCIAL WORKER PROG NOTE PSYCH
Social Work Progress Note
Progress Note
[facility] to 1pm on [facility]/31.  
 
Mesha spent alot of time sitting by the [place] this afternoon.  She enjoys 
watching the fish.  She talked about her daughter Jennifer allowing her to live at 
her house.  I told her that her daughter could call me if that was an option she
wanted to discuss.  She doesn't think Jarocho is still there.  She said he went to
[place] and then she giggled and stated "we can't wait to be together."  She 
said she doesn't think she will be here by the time of the hearing and that she 
doesn't need a conservator.  I told her we don't want anyone taking advantage of
her kind heartedness and that it was best if someone helped make sure she had 
all of her needs met.  Continues to have poor insight and judgement.

## 2018-07-25 NOTE — CP SOUTH PROGRESS NOTE PSYCH
Psych (Inpt) Progress Note
Progress Note
MELIDAW, RN, OTR/L, Group and Activities Therapist, and Psychiatrist discussed the 
pt.'s progress, inpatient treatment plan, and aftercare plans.  
 
Mental Status Update:
Jolie was alert, calm, and friendly. She was talkative, pleasant, and grandiose. 
Her delusions are about the same (she has a lot of money, she has a mansion, she
is going to give people cars, etc.).
She continues to have non-threatening voices. She remains coherent with an 
occasional tangent. She described her mood as "very happy". Her affect is elated
/elevated and expansive. She denied feeling hopeless, denied feeling worthless 
and denied thoughts of suicide.
Jolie denied thoughts of violence or homicide. She is alert and oriented to time, 
place, and person.
 
Assessment Update:
Jolie Neri is a 63-year-old  White Woman who has been at [facility]'s Inpatient Psychiatric Unit since 6/22/2018.
Although Mesha continues to have grandiose delusions and occasional audio 
hallucinations.
but no significant thought disorder and she is in good behavioral control, calm 
and pleasant.
 
Treatment Plan Update:
Reduce Zyprexa to 10 mg QHS
Continue Geodon to 40 mg with breakfast and with Supper
Continue other medications unchanged

## 2018-07-26 VITALS — SYSTOLIC BLOOD PRESSURE: 139 MMHG | DIASTOLIC BLOOD PRESSURE: 88 MMHG

## 2018-07-26 VITALS — SYSTOLIC BLOOD PRESSURE: 130 MMHG | DIASTOLIC BLOOD PRESSURE: 83 MMHG

## 2018-07-26 NOTE — SOCIAL WORKER PROG NOTE PSYCH
Social Work Progress Note
Progress Note
I Naun Barriosderek met with Mesha in her room to check in. She appeared to be in 
good spirits although she was lying down. She did not report having depression 
today. She was engaged and alert during the conversation. She mentioned that she
slept all day and was hearing voices of her  and daughter ( one one each 
side of her) The voices were positive and she stated that she has not 
experienced the negative voices for a while. She enjoyed focus group this 
morning even though  OT Eileen who run the group was not there. Mesha expressed 
feeling comfortable on the unit but mentioned that changes make her feel 
slightly anxious.  She mentioned having a concern about her weight and wanting  
to lose  twenty pound. We briefly talked about the benefits of small changes. 
Mesha told me about her and Jarocho being air and wanting to travel to places she 
has seen on TV. She enjoys watching [employer] here on the unit. She 
continue to discuss the benefits of meditations and aroma therapy. She enjoys 
the lavender scent in particular.

## 2018-07-26 NOTE — CP SOUTH PROGRESS NOTE PSYCH
Psych (Inpt) Progress Note
Progress Note
MELIDAW, RN, OTR/L, Group and Activities Therapist, and Psychiatrist discussed the 
pt.'s progress, inpatient treatment plan, and aftercare plans. 
 
Vital Signs
 
 
Date Time Temp Pulse B/P
 
07/26 0742 97.4 98 130/83
 
07/25 1946 98.2 96 97/71
 
Mental Status Update:
Jolie was alert and oriented to time, place, and person. She was calm, and 
talkative. She pleasant and grandiose. Her delusions are the same (e.g. she 
believes she has a lot of money, she has a mansion, she is going to give people 
cars, etc.).
She continues to have non-threatening voices (mostly in the right ear). She 
remains coherent with an occasional tangent. She described her mood as "very 
happy". Her affect is elated/elevated and expansive. 
Jolie denied feeling hopeless, denied feeling worthless and denied thoughts of 
suicide. She denied thoughts of violence or homicide. 
 
Assessment Update:
Jolie Neri is a 63-year-old  White Woman who has been on the Inpatient 
Psychiatric Unit since 6/22/2018.
Although Jolie continues to have grandiose delusions and audio hallucinations, she
is coherent and in good behavioral control. She is calm and pleasant and has not
voiced any thoughts of suicide or homicide throughout her stay
 
Treatment Plan Update:
Zyprexa 10 mg QHS
Continue Geodon 40 mg with breakfast and with Supper
Continue other medications unchanged

## 2018-07-27 VITALS — DIASTOLIC BLOOD PRESSURE: 72 MMHG | SYSTOLIC BLOOD PRESSURE: 128 MMHG

## 2018-07-27 VITALS — SYSTOLIC BLOOD PRESSURE: 111 MMHG | DIASTOLIC BLOOD PRESSURE: 62 MMHG

## 2018-07-27 NOTE — SOCIAL WORKER PROG NOTE PSYCH
Social Work Progress Note
Progress Note
I Naun Cha met with Mesha today in her room after focus group. She stated 
that focus group went well and seafood was discussed in the  group. She reported
sleeping well through the night without hearing voices. She also described her 
mood as high because she" is excited to start her new life". She presented with 
delusional thinking that she is discharging and going to her house in [place].
She was also under the impression that Jarocho and her daughter were coming in on 
tuesday to have a meeting and taking her home. I asked where she heard this from
and she claimed the Dr Gharaibeh (psychiatrist) told her she can leave if she 
has a roof over her head. She is having constant thoughts of  Jarocho and how she 
has known him for 48 years on July 30 of this year. I asked if she met with Dr. Gharaibeh and she said yes and she thinks he is "cute and scrunchy " The 
conversation flowed into her grandchildren and wanting to improve her memory. 
She made it sound  like she is having visual hallucinations of her grandchildren
as evidence by her stating " I see my grandchildren at at night but i can't 
remember the details ".

## 2018-07-27 NOTE — CP SOUTH PROGRESS NOTE PSYCH
Psych (Inpt) Progress Note
Progress Note
SCOUT, RN, OTR/L, Group and Activities Therapist, and Psychiatrist discussed the 
pt.'s progress, inpatient treatment plan, and aftercare plans. 
 
Vital Signs:
 
 
Date Time Temp Pulse B/P
 
07/27 0740 97.8 96 111/62
 
07/26 1949 98.3 98 139/88
 
Mental Status Update:
Jolie was alert and oriented to time, place, and person. She was calm, pleasant 
and grandiose. Her delusions are the same (e.g. believes she has a lot of money,
she has a mansion, she is going to give people cars, etc.).
She continues to have non-threatening voices (voice of ex-, Jarocho, in the
right ear and daughter's (Jennifer) in the left). She remains coherent with an 
occasional tangent. She described her mood as "very happy". Her affect is elated
/elevated and expansive. 
Jolie denied feeling hopeless, denied feeling worthless and denied thoughts of 
suicide. She denied thoughts of violence or homicide. 
 
Assessment Update:
Jolie Neri is a 63-year-old  White Woman who has been on the Inpatient 
Psychiatric Unit since 6/22/2018.
Although Jolie continues to have grandiose delusions and audio hallucinations, she
is coherent and in good behavioral control. She is calm and has not voiced any 
thoughts of suicide or homicide throughout her stay
 
Treatment Plan Update:
I suggested going up on Geodon but she was against it. 
Will await for the next step in cross titration until after the probate hearing 
(conservatorship with added medication renae)
Continue Zyprexa 10 mg QHS
Continue Geodon 40 mg with breakfast and with Supper
Continue other medications unchanged

## 2018-07-28 VITALS — DIASTOLIC BLOOD PRESSURE: 67 MMHG | SYSTOLIC BLOOD PRESSURE: 131 MMHG

## 2018-07-28 VITALS — DIASTOLIC BLOOD PRESSURE: 78 MMHG | SYSTOLIC BLOOD PRESSURE: 130 MMHG

## 2018-07-28 NOTE — CP SOUTH PROGRESS NOTE PSYCH
Psych (Inpt) Progress Note
Progress Note
Include the following elements, when applicable:
Involvement in the active treatment of the patient with behavioral observations 
of the patient and the patient's response to the treatment.
Review of the ongoing treatment process in the context of the treatment plan.
Indication of how multi-disciplinary staff members are carrying out the 
treatment plan.
Plans for future interventions and recommendations for revision of the treatment
plan.
Liaison with other physicians/providers.
Progress Note:
 
Met with dinorah and reviewed her chart briefly. She is calmer compared to 
previous admissions, thought continues to make delusional statements about her 
boyfriend Jarocho. She was aware of upcoming probate hearing and we did some 
education about what a conservator means. she slept well and has been eating 
well. She initially agreed to increase her geodon dose to target her residual 
disorganization and paranoia, then changed her mind, saying "I don't want to be 
sedated" despite some education about geodon not being very sedating. 
 
MSE: pleasant middle aged woman, well groomed and well dressed. She has ongoing 
mouth movements but no other tremor or tic. She makes good eye contact. Her 
speech is normal. Thinking is linear overall, becomes tangential at times but 
redirectable. Various disorganized statements about her future with Jarocho her 
boyfriend, but not as floridly delusional as she has been in the past. She does 
not appear to be hallucinating and denies experiencing recent hallucinations, 
thoughts to harm herself or anyone else. Insight and judgment are fair in the 
structure hospital environment but overall poor to extent of her symptoms and 
ability to participate in her own treatment planning. 
 
A: 63 year old woman with long standing history of a psychotic illness, 
treatment resistant symptoms, with impaired awareness of the severity of her 
residual symptoms and impaired ability to focus/manipulate treatment related 
information rationally or make consistent choices specifically regarding her 
medications. 
 
Plan: continue support and education, probate hearing this week for 
conservatorship with [facility] authority per previous notes.

## 2018-07-29 VITALS — DIASTOLIC BLOOD PRESSURE: 72 MMHG | SYSTOLIC BLOOD PRESSURE: 114 MMHG

## 2018-07-29 VITALS — SYSTOLIC BLOOD PRESSURE: 119 MMHG | DIASTOLIC BLOOD PRESSURE: 63 MMHG

## 2018-07-29 NOTE — CP SOUTH PROGRESS NOTE PSYCH
Psych (Inpt) Progress Note
Progress Note
Include the following elements, when applicable:
Involvement in the active treatment of the patient with behavioral observations 
of the patient and the patient's response to the treatment.
Review of the ongoing treatment process in the context of the treatment plan.
Indication of how multi-disciplinary staff members are carrying out the 
treatment plan.
Plans for future interventions and recommendations for revision of the treatment
plan.
Liaison with other physicians/providers.
Progress Note:
 
Stated that she is doing well, has been overall doing well per staff. Has a 
probate hearing for [employer] this Tuesday. Stated that it is a woman and 
she will be in charge of her money, her medication, and that she is okay with 
this decision. Stated that on July 31st it will be 48 years that her and her ex 
 metkenny while telling the story. Went on to tell me a few other 
stories about her and Jarocho, but overall less euphoric, paranoid compared to 
usual. 
 
MSE: pleasant middle aged woman, well groomed and well dressed. She has no 
psychomotor slowing/agitation, mouth movements chronic, and occasional tremor in
hands. Her eye contact is good and she is well related. Her speech is regular 
volume and rate. Thinking is somewhat tangential but overall goal directed. No 
gross delusions today, recounting her history as a young adult and excited about
it. She does not appear to be hallucinating and denies experiencing recent 
hallucinations, thoughts to harm herself or anyone else. Insight and judgment 
are fair in the structure hospital environment but overall poor to extent of her
symptoms and ability to participate in her own treatment planning. 
 
A: 63 year old woman with long standing history of a psychotic illness, 
treatment resistant symptoms, with impaired awareness of the severity of her 
residual symptoms but overall less euphoric and less manic compared to previous 
admissions. 
 
Plan: continue support and education, probate hearing this week for 
[employer].

## 2018-07-30 VITALS — DIASTOLIC BLOOD PRESSURE: 72 MMHG | SYSTOLIC BLOOD PRESSURE: 139 MMHG

## 2018-07-30 VITALS — DIASTOLIC BLOOD PRESSURE: 64 MMHG | SYSTOLIC BLOOD PRESSURE: 123 MMHG

## 2018-07-30 NOTE — SOCIAL WORKER PROG NOTE PSYCH
Social Work Progress Note
Progress Note
Mesha shared that she is anxious about the [facility] hearing tomorrow.  
She asked who would be there.  I gave her as much information as I could.  I 
emphasized why the team here wanted her to be conserved.  She is accepting.  
Stated she spent time paying over $300 today to get her phone and Jarocho's phone 
turned back on.  She didn't talk to Jarocho all weekend.  Reports voices today of 
Jarocho and Jennifer (daughter).  She also reported seeing people on TV waving to her.
 She says it makes her feel happy.  She was laughing at the end of our 
conversation due to internal stimuli and what the voices seemed to be saying to 
her today.

## 2018-07-30 NOTE — CP SOUTH PROGRESS NOTE PSYCH
Psych (Inpt) Progress Note
Progress Note
I reviewed Dr. Ravi's notes for the weekend (7/28 and 29, 2018).
 
LCSW, RN, OTR/L, Group and Activities Therapist, and Psychiatrist discussed the 
pt.'s progress, inpatient treatment plan, and aftercare plans.
 
Vital Signs:
 
 
Date Time Temp Pulse B/P
 
07/30 0747 97.1 92 123/64
 
07/29 1949 97.6 94 114/72
 
Mental Status Update:
Jolie was alert and oriented to time, place, and person. 
She was calm and pleasant. She does not bring up grandiose delusions as much (
compared to last week). She continues to have non-threatening voices. She 
remains coherent with an occasional tangent. Her mood today was "nervous about 
going to court," and her affect was within normal range.
Jolie denied feeling hopeless, denied feeling worthless and denied thoughts of 
suicide. She denied thoughts of violence or homicide. 
 
Assessment Update:
Jolie Neri is a 63-year-old  White Woman who has been on the Inpatient 
Psychiatric Unit since 6/22/2018.
Jolie is showing improvement and awaiting a bed openning at [facility] 
She is calm and has not voiced any thoughts of suicide or homicide throughout 
her stay
 
Treatment Plan Update:
Reduce Zyprexa to 7.5 mg QHS
Increase Geodon to 40 mg with breakfast and with 60 mg with Supper
Continue other medications unchanged

## 2018-07-31 VITALS — DIASTOLIC BLOOD PRESSURE: 72 MMHG | SYSTOLIC BLOOD PRESSURE: 104 MMHG

## 2018-07-31 VITALS — DIASTOLIC BLOOD PRESSURE: 76 MMHG | SYSTOLIC BLOOD PRESSURE: 123 MMHG

## 2018-07-31 NOTE — CP SOUTH PROGRESS NOTE PSYCH
Psych (Inpt) Progress Note
Progress Note
MELIDAW, RN, OTR/L, Group / Activities Therapist, and Psychiatrist discussed the 
pt.'s progress, inpatient treatment plan, and aftercare plans.
 
Vital Signs:
 
 
Date Time Temp Pulse B/P O2
 
07/31 0741 97.7 96 123/76 
 
07/30 1925 98.6 100 139/72 
 
Mental Status Update:
Pt. was alert & oriented to time, place, and person. She was calm and pleasant. 
She does not bring up grandiose delusions as much (compared to last week). She 
continues to have non-threatening voices. She remains coherent with an 
occasional tangent. Her mood today was "nervous about going to court," and her 
affect was within normal range.
Jolie denied feeling hopeless, denied feeling worthless and denied thoughts of 
suicide. She denied thoughts of violence or homicide. 
 
Assessment Update:
Pt. is a 63-year-old  White Woman who has been on the Inpatient 
Psychiatric Unit since 6/22/2018.
Jolie is showing improvement and awaiting a bed openning at [facility] 
She is calm and has not voiced any thoughts of suicide or homicide throughout 
her stay
 
Treatment Plan Update:
Continue Zyprexa 7.5 mg QHS
Continue Geodon 40 mg with breakfast and with 60 mg with Supper
Continue other medications unchanged

## 2018-08-01 VITALS — DIASTOLIC BLOOD PRESSURE: 56 MMHG | SYSTOLIC BLOOD PRESSURE: 138 MMHG

## 2018-08-01 VITALS — SYSTOLIC BLOOD PRESSURE: 130 MMHG | DIASTOLIC BLOOD PRESSURE: 62 MMHG

## 2018-08-01 NOTE — CP SOUTH PROGRESS NOTE PSYCH
Psych (Inpt) Progress Note
Progress Note
MELIDAW, RN, OTR/L, Group / Activities Therapist, and Psychiatrist discussed the 
pt.'s progress, inpatient treatment plan, and aftercare plans.
 
Vital Signs:
 
 
Date Time Temp Pulse B/P
 
08/01 0755 96.5 76 138/56
 
Mental Status Update:
Pt. was alert & oriented to time, place, and person. She seemed to be in good 
spirits: calm, pleasant, with animated affect. 
She does not bring up grandiose delusions as much (compared to last week). She 
continues to have non-threatening voices. She remains coherent with an 
occasional tangent. 
Jolie denied feeling depressed, denied feeling hopeless, denied feeling worthless,
and denied thoughts of suicide. She denied thoughts of violence or homicide. 
 
Assessment Update:
Pt. is a 63-year-old  White Woman who has been on the Inpatient 
Psychiatric Unit since June 22, 2018.
Since her admission, Jolie has shown improvement and awaiting a bed openning at 
[facility]. She is calm and has not voiced any 
thoughts of suicide or homicide throughout her stay.
 
Treatment Plan Update:
Continue Zyprexa 7.5 mg QHS
Continue Geodon 40 mg with breakfast and with 60 mg with Supper
Continue other medications unchanged

## 2018-08-01 NOTE — SOCIAL WORKER PROG NOTE PSYCH
Social Work Progress Note
Progress Note
Called [facility] admissions.  I was told that there are about 8 or 9 people on the 
list for beds.  Faxed over this past week's clinical to them for an update.
Spoke with Bonnie Srivastava at Prob[address].  The  is trying to move the hearing 
to next week if possible.  She asked if documention had been found about the 
Sister being the healthcare representative?  I told her that there was a 
document found from 2014, but nothing was signed.  I faxed it to her office.
Met with Mesha.  She remains grandiose about Jarocho and her having tons of 
friends and people loving them.  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  She shared that her Sister told her not to call 
her anymore after embarrassing her in front of everyone at the hearing 
yesterday.  Apparently her Sister was taken back by Mesha's comment about not 
wanting her to be her conservator.  Mesha is happy to have the other person 
assigned.  She is feeling happy today.  Mood is a little elevated.  States she 
is "fixed now."  Asked what she is fixed from?  She said "I was having an 
identity issue."

## 2018-08-02 VITALS — SYSTOLIC BLOOD PRESSURE: 123 MMHG | DIASTOLIC BLOOD PRESSURE: 68 MMHG

## 2018-08-02 VITALS — SYSTOLIC BLOOD PRESSURE: 137 MMHG | DIASTOLIC BLOOD PRESSURE: 76 MMHG

## 2018-08-02 NOTE — SOCIAL WORKER PROG NOTE PSYCH
**See Addendum**
Social Work Progress Note
Progress Note
I Naun Cha met with Mesha this morning. Prior to meeting with her she was 
socializing in the lounge with peers. During the meeting her affect was positive
she was alert and engaged throughout the conversation. She is still experiencing
auditory hallucinations hearing both Jarocho and Jennifer's voices. She stated that 
she spoke with Jarocho on the phone last night and this morning and is under the 
impression that he is coming to visit her on Saturday. This led into a brief 
discussion of her grandchildren who Mesha comments " that she loves them but 
they are wild and are  always correcting her  of  small things such as the names
of TV shows."  She mentioned that her sister is still mad at her but she is not 
taking it personally. Mesha expressed that she is connecting well with peers on 
the unit and she is "riding the waves which are all about trust". She reports 
and eating and sleeping well. She is enjoying the groups on the unit and showed 
me the box she made in focus  group. She did not have any concerns to discuss.

## 2018-08-02 NOTE — CP SOUTH PROGRESS NOTE PSYCH
Psych (Inpt) Progress Note
Progress Note
MELIDAW, RN, OTR/L, Group / Activities Therapist, and Psychiatrist discussed the 
pt.'s progress, inpatient treatment plan, and aftercare plans.
 
Vital Signs:
 
 
Date Time Temp Pulse B/P
 
08/02 0743 97.5 96 123/68
 
08/01 1958 99.1 96 130/62
 
Mental Status Update:
Pt. was alert & oriented to time, place, and person. She seemed to be in good 
spirits: calm, pleasant, with animated affect. She does not bring up grandiose 
delusions as much (compared to last week). She continues to have non-threatening
voices. She remains coherent with an occasional tangent. Jolie denied feeling 
depressed, denied feeling hopeless, denied feeling worthless, and denied 
thoughts of suicide. She denied thoughts of violence or homicide. 
 
Assessment Update:
Pt. is a 63-year-old  White Woman who has been on the Inpatient 
Psychiatric Unit since June 22, 2018.
Since her admission, Jolie has shown improvement and awaiting a bed openning at 
[facility]. She is calm and has not voiced any 
thoughts of suicide or homicide throughout her stay.
 
Treatment Plan Update:
Continue Zyprexa 7.5 mg QHS
Continue Geodon 40 mg with breakfast and with 60 mg with Supper
Continue other medications unchanged

## 2018-08-03 VITALS — SYSTOLIC BLOOD PRESSURE: 121 MMHG | DIASTOLIC BLOOD PRESSURE: 71 MMHG

## 2018-08-03 VITALS — SYSTOLIC BLOOD PRESSURE: 129 MMHG | DIASTOLIC BLOOD PRESSURE: 61 MMHG

## 2018-08-03 NOTE — CP SOUTH PROGRESS NOTE PSYCH
Psych (Inpt) Progress Note
Progress Note
The pt.'s progress, inpatient treatment plan, and aftercare plans were discussed
in the morning treatment planning meeting (team members: Virgie Jacome, MELIDAW, RN, 
OTR/L, and Psychiatrist)
 
Vital Signs:
 
 
Date Time Temp Pulse B/P O2
 
 0736 97.9 87 129/61 
 
 1926 98.2 92 137/76 
 
Mental Status:
Mesha does not spontaneously bring up grandiose delusions. She does continue to 
have them but delusions need to be elicited. 
She is starting to show Parkinsonian tremor now the dose of Geodon is 40+60 mg
Still she prefers that to the weight gain with Zyprexa (and poor Glycemic 
control)
She was alert & oriented to time, place, and person. She seemed to be in good 
spirits: calm, pleasant, with animated affect. She continues to have non-
threatening voices. 
Rekha remains coherent with an occasional tangent. Jolie denied feeling depressed, 
denied feeling hopeless, denied feeling worthless, and denied thoughts of 
suicide. She denied thoughts of violence or homicide. 
 
Assessment Update:
Mesha Neri (: ) is a 63-year-old  White Woman who has been on the 
Inpatient Psychiatric Unit since 2018.
Jolie is coherent, he delusions are less pronounced, she is awaiting a bed 
openning at [facility]. She is calm and has not 
voiced any thoughts of suicide or homicide throughout her stay.
 
Treatment Plan Update:
Reduce Metformin to 500 mg just once daily at 1700 hours (she is showing better 
glycemic control with lower Zyprexa dose)
Continue Zyprexa 7.5 mg QHS
Continue Geodon 40 mg with breakfast and with 60 mg with Supper
Continue other medications unchanged

## 2018-08-03 NOTE — SOCIAL WORKER PROG NOTE PSYCH
Social Work Progress Note
Progress Note
Mesha was in a happy mood today.  She was talking about her house in [place] 
and stated "I just found out today that it has an elevator."  Challenged some of
her thoughts about this today and stated that I believed these to be delusions. 
She agreed that her voices are telling her this information and it may not be 
true.  She continued to talk about the house.  So I just said "it's nice to 
fantasize" and she agreed.  I told her that the reality though is that she has 
no where to live right now.  She said "Jarocho's working on that."  She talked 
about Jarocho staying at her daughter's house.  Admires and respects the life her 
daughter has made for herself.  Sounds like she wishes she were part of it more 
than she is.  
Called Probate Court and spoke with Bonnie Srivastava.  She said that the hearing may be
on 8/16 and if the Sister does not come up with a signed docment then we will 
proceed with conservatorship to [place].

## 2018-08-04 VITALS — DIASTOLIC BLOOD PRESSURE: 76 MMHG | SYSTOLIC BLOOD PRESSURE: 131 MMHG

## 2018-08-04 VITALS — SYSTOLIC BLOOD PRESSURE: 127 MMHG | DIASTOLIC BLOOD PRESSURE: 64 MMHG

## 2018-08-04 NOTE — CP SOUTH PROGRESS NOTE PSYCH
Psych (Inpt) Progress Note
Progress Note
Pt less talkative today than when previously seen. She alert and awake. Notes 
that he is "just thinking" but declined to expand. Denies SI or HI. Seems to be 
responding to internal stimuli.
 
 Current Medications
 
 
  Sig/Sharan Start time  Last
 
Medication Dose Route Stop Time Status Admin
 
Acetaminophen 650 MG Q4P PRN 06/22 1500 AC 07/22
 
  PO   0053
 
Al Hydroxide/Mg  30 ML Q4-6 PRN PRN 06/22 1500 AC 08/02
 
Hydroxide  PO   2129
 
Aspirin Buffered 81 MG DAILY 06/23 0900 AC 08/04
 
  PO   0846
 
Atorvastatin Calcium 40 MG 1700 06/22 1700 AC 08/03
 
  PO   1639
 
Calcium/Vitamin D 250 MG DAILY 06/23 0934 AC 08/04
 
  PO   0846
 
Clonazepam 0.5 MG AT BEDTIME 07/31 2100 AC 08/03
 
  PO 08/07 2059 2038
 
Cyanocobalamin 250 MCG DAILY 07/19 1415 AC 08/04
 
  PO   0846
 
Levothyroxine Sodium 0.125 MG DAILY AC 06/23 0932 AC 08/04
 
  PO   0615
 
Magnesium Hydroxide 30 ML AT BEDTIME PRN 07/05 0800 AC 07/24
 
  PO   2046
 
Metformin HCl 500 MG 1700 08/02 1700 AC 08/03
 
  PO   1639
 
Multivitamins 1 TAB DAILY 06/23 0933 AC 08/04
 
  PO   0846
 
Olanzapine 7.5 MG AT BEDTIME 07/30 2100 AC 08/03
 
  PO   2038
 
Ziprasidone 60 MG 0900 07/31 0900 AC 08/04
 
  PO   0846
 
Ziprasidone 40 MG 1800 07/28 1800 AC 08/03
 
  PO   1805
 
 
Vital Signs
 
 
Date Time Temp Pulse Resp B/P B/P Pulse O2 O2 Flow FiO2
 
     Mean Ox Delivery Rate 
 
08/04 0737 97.2 86  127/64     
 
08/03 1930 98.0 95  121/71     
 
 
 
MSE
Appearance: as stated age
Speech :  nl rate, rhythm, volume and prosody  
Behavior: cooperative
Motor:  no psychomotor agitation or retardation
Mood : good
Affect : non-labile, appropriate, constricted, flat
Thought process:  much less tangential but slower thought process
Thought content : no delusions, no paranoia noted, but seems to be responding to
internal stimuli
Perceptions: denied AVHs, denied SI or HI
Insight: poor
Judgment:  poor
 
A/P: Pt with schizoaffective disorder, bipolar disorder with continued psychotic
sx. 
 
Continue current medication regimen
Encourage integration into the milieu

## 2018-08-05 VITALS — SYSTOLIC BLOOD PRESSURE: 128 MMHG | DIASTOLIC BLOOD PRESSURE: 61 MMHG

## 2018-08-05 VITALS — DIASTOLIC BLOOD PRESSURE: 69 MMHG | SYSTOLIC BLOOD PRESSURE: 122 MMHG

## 2018-08-05 NOTE — CP SOUTH PROGRESS NOTE PSYCH
Psych (Inpt) Progress Note
Progress Note
Pt notes that she is "pretty good." SLept well. Expressed concerns about her 
weight and feeling "fat." She also feels that she does not look nice because no 
bottom teeth. No issues with swallowing or choking. Does not want modified diet 
and feels she can chew. Denies SI or HI.
 
 Current Medications
 
 
  Sig/Sharan Start time  Last
 
Medication Dose Route Stop Time Status Admin
 
Acetaminophen 650 MG Q4P PRN 06/22 1500 AC 07/22
 
  PO   0053
 
Al Hydroxide/Mg  30 ML Q4-6 PRN PRN 06/22 1500 AC 08/02
 
Hydroxide  PO   2129
 
Aspirin Buffered 81 MG DAILY 06/23 0900 AC 08/05
 
  PO   0831
 
Atorvastatin Calcium 40 MG 1700 06/22 1700 AC 08/04
 
  PO   1654
 
Calcium/Vitamin D 250 MG DAILY 06/23 0934 AC 08/05
 
  PO   0831
 
Clonazepam 0.5 MG AT BEDTIME 07/31 2100 AC 08/04
 
  PO 08/07 2059 2021
 
Cyanocobalamin 250 MCG DAILY 07/19 1415 AC 08/05
 
  PO   0831
 
Levothyroxine Sodium 0.125 MG DAILY AC 06/23 0932 AC 08/05
 
  PO   0623
 
Magnesium Hydroxide 30 ML AT BEDTIME PRN 07/05 0800 AC 07/24
 
  PO   2046
 
Metformin HCl 500 MG 1700 08/02 1700 AC 08/04
 
  PO   1655
 
Multivitamins 1 TAB DAILY 06/23 0933 AC 08/05
 
  PO   0831
 
Olanzapine 7.5 MG AT BEDTIME 07/30 2100 AC 08/04
 
  PO   2021
 
Ziprasidone 60 MG 0900 07/31 0900 AC 08/05
 
  PO   0831
 
Ziprasidone 40 MG 1800 07/28 1800 AC 08/04
 
  PO   1713
 
 
Vital Signs
 
 
Date Time Temp Pulse Resp B/P B/P Pulse O2 O2 Flow FiO2
 
     Mean Ox Delivery Rate 
 
08/05 0730 97.5 96  128/61     
 
08/04 1939 98.5 93  131/76     
 
 
 
MSE
Appearance: as stated age
Speech :  nl rate, rhythm, volume and prosody  
Behavior: cooperative
Motor:  no psychomotor agitation or retardation
Mood : pretty good
Affect : non-labile, appropriate, constricted, flat
Thought process:  much less tangential but slower thought process
Thought content : no delusions, no paranoia noted, does not seem to be 
responding to internal stimuli
Perceptions: denied AVHs, denied SI or HI
Insight: poor
Judgment:  poor
 
A/P: Pt with schizoaffective disorder, bipolar disorder with continued psychotic
sx, though improved. 
 
Continue current medication regimen
Encourage integration into the milieu

## 2018-08-06 VITALS — DIASTOLIC BLOOD PRESSURE: 76 MMHG | SYSTOLIC BLOOD PRESSURE: 118 MMHG

## 2018-08-06 VITALS — DIASTOLIC BLOOD PRESSURE: 55 MMHG | SYSTOLIC BLOOD PRESSURE: 124 MMHG

## 2018-08-06 VITALS — DIASTOLIC BLOOD PRESSURE: 53 MMHG | SYSTOLIC BLOOD PRESSURE: 109 MMHG

## 2018-08-06 NOTE — CP SOUTH PROGRESS NOTE PSYCH
Psych (Inpt) Progress Note
Progress Note
I reviewed Dr. Maynard's notes for the weekend of  and 2018.
 
The pt.'s progress, inpatient treatment plan, and aftercare plans were discussed
in the morning treatment planning meeting (team members: Virgie Jacome LCSW; Kate Otto, RN; OTR/L, and Psychiatrist)
 
Vital Signs:
 
 
Date Time Temp Pulse B/P
 
 0759 97.4 87 109/53
 
2001 98.3 93 122/69
 
Mental Status:
Jolie's grandiose delusions are dormant/require eliciting to bring to surface. 
Mild Parkinsonian tremor. She was alert & oriented to time, place, and person. 
She seemed to be in good spirits: calm, pleasant, with animated affect. She 
continues to have non-threatening voices. 
Jolie remains coherent with an occasional tangent. Jolie denied feeling depressed, 
denied feeling hopeless, denied feeling worthless, and denied thoughts of 
suicide. She denied thoughts of violence or homicide. 
 
Assessment Update:
Jolie Neri (: ) is a 63-year-old  White Woman who has been on the 
Inpatient Psychiatric Unit since 2018.
Jolie is coherent, he delusions are less pronounced, she is awaiting a bed 
openning at [facility]. She is calm and has not 
voiced any thoughts of suicide or homicide throughout her stay.
 
Treatment Plan Update:
1) Reduce Zyprexa to 5 mg QHS
2) Increase Geodon to 60 mg BID
Continue other medications unchanged

## 2018-08-06 NOTE — SOCIAL WORKER PROG NOTE PSYCH
Social Work Progress Note
Progress Note
Conservatorship hearing is scheduled for 8/16 at 1pm.  Mesha was sleeping 
soundly in her room this afternoon.  We did not get to meet face to face today.

## 2018-08-07 VITALS — DIASTOLIC BLOOD PRESSURE: 74 MMHG | SYSTOLIC BLOOD PRESSURE: 129 MMHG

## 2018-08-07 VITALS — SYSTOLIC BLOOD PRESSURE: 117 MMHG | DIASTOLIC BLOOD PRESSURE: 73 MMHG

## 2018-08-07 NOTE — SOCIAL WORKER PROG NOTE PSYCH
Social Work Progress Note
Progress Note
Mesha started our meeting this morning by stating she was a little down due to 
the reality of her situation.  She stated "I have no money, no mansion, no limo,
and no where to live."  She also stated she was upset over not hearing from 
Jarocho in 2 days.  Shortly later in the conversation she then started to say that
she believes that she does have alot of money somewhere and that Meme is in 
charge of it and is keeping it from her.  She believes this money is from "all 
the movies" her and Jarocho have done.  She is afraid that Meme (sister) is mad at
her.  She hasn't heard from her since last week when she told her not to call 
her anymore.  I told her that the hearing is scheduled for 8/16.  She continues 
to feel that Senorra would be a better conservator for her than her sister.  
Mesha asked if we found the document her sister was referencing?  I told her 
that there was a document found from 4 years ago, but there were no signatures 
on it.  She said she doesn't recall the word conservator ever coming up before. 
Mesha doesn't seem to recall that she did have a voluntary conservator at one 
point.  
 
Called [facility] and spoke with Edelmira in admissions.  She did asked for a week's 
worth of clinical to be faxed over.  She didn't indicate that there was any bed 
openings anytime soon.
 
Faxed clinical.

## 2018-08-07 NOTE — CP SOUTH PROGRESS NOTE PSYCH
Psych (Inpt) Progress Note
Progress Note
The pt.'s progress, inpatient treatment plan, and aftercare plans were discussed
in the morning treatment planning meeting (team members: Virgie Jacome LCSW; Kate Otto RN; OTR/L, and Psychiatrist)
 
Vital Signs:
Vital Signs
 
 
Date Time Temp Pulse B/P B/P Pulse O2
 
08/07 0741 97.5 100 117/73   
 
08/06 1936 97.5 76 124/55   
 
Mental Status:
The patient is in good spirits. Her grandiose delusions are dormant/require 
eliciting to bring to surface. She has a Parkinsonian tremor which is somewhat 
more evident at the new dose of Geodon-60 mg BID (still, she prefers the tremor 
to the weight gain with Zyprexa). She was alert & oriented to time, place, and 
person. The seemed to be in good spirits, calm, pleasant, with animated affect. 
She continues to have non-threatening voices. Jolie remains coherent with an 
occasional tangent. The patient denied feeling depressed, denied feeling 
hopeless, denied feeling worthless, and denied thoughts of suicide. She denied 
thoughts of violence or homicide. 
 
Assessment Update:
A 63-year-old  White Woman who has been on the Inpatient Psychiatric 
Unit since June 22, 2018.
The is coherent and her delusions are now dormant, she is awaiting a bed 
openning at [facility] ([facility] The patient is calm
, pleasant, and has not voiced any thoughts of suicide or homicide throughout 
her stay.
 
Treatment Plan Update:
1) Continue Zyprexa 5 mg QHS for 3 more weeks
2) Continue Geodon 60 mg BID for 3 more weeks
Continue other medications unchanged

## 2018-08-08 VITALS — SYSTOLIC BLOOD PRESSURE: 113 MMHG | DIASTOLIC BLOOD PRESSURE: 68 MMHG

## 2018-08-08 VITALS — SYSTOLIC BLOOD PRESSURE: 120 MMHG | DIASTOLIC BLOOD PRESSURE: 77 MMHG

## 2018-08-08 NOTE — SOCIAL WORKER PROG NOTE PSYCH
Social Work Progress Note
Progress Note
Dr. Gharaibeh and I met with Mesha this morning.  She continues to hear voices 
of Jennifer and Jarocho that are non-threatening in nature.  She says "they think I'm 
funny."  She is giddy as she speaks.  She talked about working on her 
nutritional intake as she is worried about weight gain.  Denies issues with 
sleep.  Klonopin was changed to a PRN at bedtime in case she may need it.  She 
reported sleeping well last night without it.  She is aware of the hearing next 
week for [employer].  She doesn't think that her Sister will be there, due 
to being in [place] on vacation.  She has not talked with her.  I asked about 
any money being left to her possibly by her  Brother?  She doesn't 
believe so, but states she never received any money after her Mother's passing 
and believes that there may be money from her Condo.  She thought it had been 
left in her name, but nothing ever came of it.  She doesn't trust her family in 
that regard.

## 2018-08-08 NOTE — CP SOUTH PROGRESS NOTE PSYCH
Psych (Inpt) Progress Note
Progress Note
The pt.'s progress, inpatient treatment plan, and aftercare plans were discussed
in the morning treatment planning meeting (team members: Virgie Jacome LCSW; RN; 
OTR/L, and Psychiatrist)
 
Vital Signs:
 
 
Date Time Temp Pulse B/P
 
08/08 0742 96.6 98 120/77
 
08/07 1932 98.4 100 129/74
 
Mental Status:
The patient is alert & oriented to time, place, and person. She is in good 
spirits
Her grandiose delusions are dormant/require eliciting to bring to surface. She 
has a Parkinsonian tremor which is somewhat more evident at the new dose of 
Geodon-60 mg BID (still, she prefers the tremor to the weight gain with Zyprexa)
. She is calm, pleasant, with animated affect. She continues to have non-
threatening voices. Jolie remains coherent with an occasional tangent. The patient
denied feeling depressed, denied feeling hopeless, denied feeling worthless, and
denied thoughts of suicide. She denied thoughts of violence or homicide. 
 
Assessment Update:
A 63-year-old  White female who has been on the Inpatient Psychiatric 
Unit since June 22, 2018.
The is coherent and her delusions are now dormant, she is awaiting a bed 
openning at [facility] ([facility] The patient is calm
, pleasant, and has not voiced any thoughts of suicide or homicide throughout 
her stay.
 
Treatment Plan Update:
Continue medications unchanged

## 2018-08-09 VITALS — SYSTOLIC BLOOD PRESSURE: 106 MMHG | DIASTOLIC BLOOD PRESSURE: 72 MMHG

## 2018-08-09 VITALS — SYSTOLIC BLOOD PRESSURE: 122 MMHG | DIASTOLIC BLOOD PRESSURE: 62 MMHG

## 2018-08-09 NOTE — SOCIAL WORKER PROG NOTE PSYCH
Social Work Progress Note
Progress Note
Mesha was sitting by the fish tank this afternoon.  She said she was trying to 
calm down due to eating too much sugar today.  She said she ate too much and 
then got hyper.  I gave Mesha the Probate letter from court about her hearing 
next Thursday.  She said she was looking forward to the hearing and feels that 
the conservator can help her.  She still hasn't talked with her Sister since the
day her sister came in for the hearing.  She shared memories of times her and 
her sister have fought.  Mood appeared stable.

## 2018-08-09 NOTE — CP SOUTH PROGRESS NOTE PSYCH
Psych (Inpt) Progress Note
Progress Note
The pt.'s progress, inpatient treatment plan, and aftercare plans were discussed
in the morning treatment planning meeting (team members: Virgie Jacome LCSW; RN; 
OTR/L, and Psychiatrist)
 
Mental Status:
The patient is alert & oriented, in good spirits, and in good behavioral 
control.
Her grandiose delusions are dormant/require eliciting to bring to surface. She 
has a Parkinsonian tremor. The patient is calm, pleasant, with animated affect. 
She continues to have non-threatening voices. 
The patient remains coherent (with an occasional tangent),  denied feeling 
depressed, denied feeling hopeless, denied feeling worthless, and denied 
thoughts of suicide. She denied thoughts of violence or homicide. 
 
Assessment Update:
A 63-year-old  white female who has been on the Inpatient Psychiatric 
Unit since June 22, 2018.
The patient is coherent and her delusions are now dormant, she is awaiting a bed
openning at [facility] ([facility]). The patient is calm
, pleasant, and has not voiced any thoughts of suicide or homicide throughout 
her stay.
 
Treatment Plan Update:
Continue medications unchanged

## 2018-08-10 VITALS — DIASTOLIC BLOOD PRESSURE: 66 MMHG | SYSTOLIC BLOOD PRESSURE: 121 MMHG

## 2018-08-10 VITALS — SYSTOLIC BLOOD PRESSURE: 118 MMHG | DIASTOLIC BLOOD PRESSURE: 67 MMHG

## 2018-08-10 NOTE — CP SOUTH PROGRESS NOTE PSYCH
Psych (Inpt) Progress Note
Progress Note
The pt.'s progress, inpatient treatment plan, and aftercare plans were discussed
in the treatment planning meeting (team members: Virgie Jacome LCSW; RN; OTR/L, and
Psychiatrist)
 
 Laboratory Tests
 
 
 08/10
 
  Cholesterol (<200 MG/DL) 143
 
  TSH &T3 &Free T4 Intrp (0.270 - 4.20 uIU/mL) Pending
 
 
Vital Signs
 
 
Date Time Temp Pulse B/P
 
08/10 0732 97.2 96 121/66
 
08/09 2006 99.2 96 122/62
 
 
Mental Status:
The patient is alert & oriented, in good spirits, and in good behavioral 
control. Her grandiose delusions are dormant/require eliciting to bring to 
surface. She has a Parkinsonian tremor. The patient is calm, pleasant, with 
animated affect. She continues to have non-threatening voices. 
The patient remains coherent (with an occasional tangent),  denied feeling 
depressed, denied feeling hopeless, denied feeling worthless, and denied 
thoughts of suicide. She denied thoughts of violence or homicide. 
 
Assessment Update:
A 63-year-old  white female who has been on the Inpatient Psychiatric 
Unit since June 22, 2018. She was admitted because of disorganized thinking, 
hallucinations, and delusions.
Lately, the patient has been/is coherent and her delusions are now dormant, she 
is awaiting a bed openning at [facility] ([facility]). 
The patient is calm, pleasant, and has not voiced any thoughts of suicide or 
homicide throughout her stay.
 
Medication monitoring:
So far the side effects that are considered clinically significant are weight 
gain and pedal parkinsonian tremors.  The weight gain is most likely due to the 
Zyprexa and the dose has been gradually tapered down and gradually replaced with
higher doses of Geodon.  The parkinsonian tremors are due to the increasing dose
of Geodon.  The patient has residual tardive dyskinesia but it has shown 
significant improvement since her previous admission to [facility]
 
Treatment Plan Update:
Continue medications unchanged

## 2018-08-10 NOTE — SOCIAL WORKER PROG NOTE PSYCH
Social Work Progress Note
Progress Note
Patient presented as alert, calm, pleasant, cooperative and orientated x3 with 
stable mood and congruent affect.  Patient denied anxiety and depression 
reporting being "happy." Patient denies SI/HI.  Mesha states she has been in [facility] since June 22 and is waiting for a bed at [facility]
as well as having a conservator appointed for her. She states that she has been 
focusing on losing weight and being healthy doing 90 laps walking downstairs 
today and eating healthier avoiding juices.  She hinted at her delusional 
thought of another living arrangement, a mansion with a  and elevator 
stating "but we do not talk about that." The patient reports she is doing well 
on the unit and enjoys watching [employer] and Wheel of Fortune every night.  She 
spoke of being a Yankee fan and hoping to watch the game tonight.

## 2018-08-11 VITALS — DIASTOLIC BLOOD PRESSURE: 72 MMHG | SYSTOLIC BLOOD PRESSURE: 118 MMHG

## 2018-08-11 VITALS — DIASTOLIC BLOOD PRESSURE: 65 MMHG | SYSTOLIC BLOOD PRESSURE: 139 MMHG

## 2018-08-11 NOTE — CP SOUTH PROGRESS NOTE PSYCH
Psych (Inpt) Progress Note
Progress Note
Include the following elements, when applicable:
Involvement in the active treatment of the patient with behavioral observations 
of the patient and the patient's response to the treatment.
Review of the ongoing treatment process in the context of the treatment plan.
Indication of how multi-disciplinary staff members are carrying out the 
treatment plan.
Plans for future interventions and recommendations for revision of the treatment
plan.
Liaison with other physicians/providers.
Progress Note:
 
Case discussed with charge nurse in AM. Pt has been visible in the unit and 
cooperative with care.]
Upon assessment reports feeling "more leveled" OCnitnues to have intermittent AH
non threathening and pleasant in nature. " everything is going to be okay". 
Taking medications as prescribed. Denies any SE. Mentions she is planning to " 
renew vows" with her BF".
MSE Elderly female, pleasant, cooperative fair eye contact. reactive and 
appropiate affect. No SI/HI still with AVH. TP mostly organized Cog AAox3 I/J 
Fair. 
 
A/P 62 y/o WF Schizoaffective Dx. Improving. Toleratign medication changes. Will
continue same tx plan.

## 2018-08-12 VITALS — SYSTOLIC BLOOD PRESSURE: 123 MMHG | DIASTOLIC BLOOD PRESSURE: 69 MMHG

## 2018-08-12 VITALS — SYSTOLIC BLOOD PRESSURE: 126 MMHG | DIASTOLIC BLOOD PRESSURE: 59 MMHG

## 2018-08-12 NOTE — CP SOUTH PROGRESS NOTE PSYCH
Psych (Inpt) Progress Note
Progress Note
Include the following elements, when applicable:
Involvement in the active treatment of the patient with behavioral observations 
of the patient and the patient's response to the treatment.
Review of the ongoing treatment process in the context of the treatment plan.
Indication of how multi-disciplinary staff members are carrying out the 
treatment plan.
Plans for future interventions and recommendations for revision of the treatment
plan.
Liaison with other physicians/providers.
Progress Note:
 
Mesha remains stable. Visible in the unit. LIkes to walk " I did 2 miles 
yesterday". Toelrating medications well " I like Geodon". Sleep and appetite 
regular. Knows she is awaiting for a respite bed. 
MSE Elderly woman, Fairly groomed. bright affect. cooperative. No SI/HI .NoAVH .
not overt delusional themes.  organized and linear mostly. Cog Aaox3 I/J Fair. 
 
A/P 64 y/o WF. Schizoaffective Disorder, MOst likely at baseline. Awaiting for 
respite bed. Continue same tx plan.

## 2018-08-13 VITALS — DIASTOLIC BLOOD PRESSURE: 75 MMHG | SYSTOLIC BLOOD PRESSURE: 125 MMHG

## 2018-08-13 VITALS — DIASTOLIC BLOOD PRESSURE: 53 MMHG | SYSTOLIC BLOOD PRESSURE: 132 MMHG

## 2018-08-13 NOTE — SOCIAL WORKER PROG NOTE PSYCH
Social Work Progress Note
Progress Note
Natalie Gonzalez from [facility] called to say that they will admit Mesha.  I shared 
that she has a conservatorship hearing this Thursday and asked if that would 
then transfer to their Probate Court.  She stated she needed to ask about that 
and would get back to me.  Natalie and I spoke a while later and she shared 
that she is waiting to see if they can hold the bed for her until Friday, so she
could have the hearing first.  She will call tomorrow with an answer.
Mesha was informed that she may be leaving this week.  She is thankful for her 
treatment here and looking forward to the next step. She asked if she will still
proceed with the conservatorship hearing?  I told her that we most likely will 
be and I will let her know.   She shared that she made a comment over the 
weekend that upset another peer.  She didn't seem bothered by the fact that she 
upset this other individual.  Encouraged her to not make sexual or "off putting
" comments.

## 2018-08-13 NOTE — CP SOUTH PROGRESS NOTE PSYCH
Psych (Inpt) Progress Note
Progress Note
The pt.'s progress, inpatient treatment plan, and aftercare plans were discussed
in the treatment planning meeting (team members: SCOUT, RN; OTR/L, and 
Psychiatrist)
 
Vital Signs
 
 
Date Time Temp Pulse B/P
 
08/13 0746 97.6 84 125/75
 
08/12 1954 98.1 90 126/59
 
 
Mesha remains in good behavioral control/stable. She is toelrating medications 
well, denies side effects, "I like Geodon", but she does have mild Parkinsonian 
shakes (she has mild Tardive Dyskinesia from decades of neuroleptics, and has 
gained weight with Zyprexa).
Sleep and appetite are "good" 
she is awaiting for a bed at [facility]. 
bright affect. cooperative. No SI/HI .
audio hallucinations are manageable, no overt delusions, organized and linear 
mostly. Cog Aaox3 I/J Fair. 
 
A/P:
 64 y/o WF. Schizoaffective Disorder, 
Doing well despite some AH and grandiose delusions
Awaiting a bed at [facility]. Continue same tx plan.

## 2018-08-14 VITALS — DIASTOLIC BLOOD PRESSURE: 69 MMHG | SYSTOLIC BLOOD PRESSURE: 111 MMHG

## 2018-08-14 VITALS — SYSTOLIC BLOOD PRESSURE: 106 MMHG | DIASTOLIC BLOOD PRESSURE: 59 MMHG

## 2018-08-14 NOTE — SOCIAL WORKER PROG NOTE PSYCH
Social Work Progress Note
Progress Note
Mesha approached me this morning asking if I could contact the proposed 
conservator to meet with her before the hearing.  I told Mesha I could ask the 
probate court, but couldn't promise that could happen.  Mesha reported that her 
Sister Meme met with her here last night.  She said the visit went well.  I 
asked if she was going to come to the hearing?  She didn't seem to know.  She 
continues to feel that Meme would not be the right person to be conservator.  We
talked about the bed possibly opening up at [facility] this week.  She said "I don't 
think I want to go there."  She reported that Jarocho and her are going to be 
getting a place this week and that the doctor is going to discharge her if she 
has a roof over her head.  She remains delusional about her financial situation 
and stated that the conservator will just help her with giving money to people 
in need.  Reports her goal today is to talk to the proposed conservator.  
Reports mood is "high up."  Continues to hear voices of a non threatening 
nature.
 
Received a voicemail from Natalie Gonzalez at [facility] that they will admit Mesha on 
Friday 8/17.  They are requesting current labs, EKG, copy of updated legal 
status and that she come there before late afternoon.  I told her I would fax 
everything to her tomorrow and contact [facility] about a ride.

## 2018-08-14 NOTE — CP SOUTH PROGRESS NOTE PSYCH
Psych (Inpt) Progress Note
Progress Note
The pt.'s progress, inpatient treatment plan, and aftercare plans were discussed
in the treatment planning meeting (team members: SCOUT, RN; OTR/L, and 
Psychiatrist)
 
Vital Signs:
Vital Signs
 
 
Date Time Temp Pulse B/P B/P Pulse O2 O2 Flow FiO2
 
08/14 0748 98.2 87 106/59     
 
08/13 2004 97.9 89 132/53     
 
 
 
 
Mesha is in good spirits. She is (and has been) in good behavioral control.
She reports that she is in a "good mood." She denies wishing death or thinking 
of suicide. She is toelrating medications well, denies side effects (but does 
have mild Parkinsonian shakes and mild Tardive Dyskinesia from decades of 
neuroleptics, and has gained weight with Zyprexa).
She says sleep and appetite are "good". She is awaiting for a bed at [facility], she 
has bright affect, cooperative. She denies violent thoughts.
She reports that audio hallucinations are manageable, no overt delusions, 
organized and linear mostly. 
 
Assessment Update:
Mesha Neri is a 62 y/o WF. Schizoaffective Disorder, 
Doing well despite some AH and grandiose delusions
 
Treatment Plan Update:
Awaiting a bed at [facility]. 
Continue same tx plan.

## 2018-08-15 VITALS — SYSTOLIC BLOOD PRESSURE: 148 MMHG | DIASTOLIC BLOOD PRESSURE: 58 MMHG

## 2018-08-15 VITALS — DIASTOLIC BLOOD PRESSURE: 52 MMHG | SYSTOLIC BLOOD PRESSURE: 101 MMHG

## 2018-08-15 NOTE — SOCIAL WORKER PROG NOTE PSYCH
Social Work Progress Note
Progress Note
Faxed documents to [facility].  Called Bonnie Srivastava at [place] [address].  Asked if it
was possible for Mesha to talk with Jennie before the hearing?  She said she 
will ask her and give her my number.  Mesha was in her room resting around 11:
30a.  She reports feeling really tired today and stated she almost fell asleep 
during group, so she came to lay down.  I told her things are definete for 
Friday admission to [facility].  She is a little sad about leaving.  I tried to give 
some encouragement that her soon to be conservator would hopefully help her come
up with a good discharge plan from there as she deserves it.  
Called [facility] and scheduled transport to p/u Mesha at 11am on Friday to bring her 
to [facility].
Called Natalie Gonzalez at [facility] to ensure she got my fax.  She said she needed to
check.  Mark later called stating the doctor is requesting a new EKG and 
current labs.

## 2018-08-15 NOTE — CP SOUTH PROGRESS NOTE PSYCH
Psych (Inpt) Progress Note
Progress Note
The pt.'s progress, inpatient treatment plan, and aftercare plans were discussed
in the treatment planning meeting (team members: SCOUT, RN; OTR/L, and 
Psychiatrist)
 
Vital Signs:
 
 
Date Time Temp Pulse B/P
 
08/15 0734 96.1 98 101/52
 
2007 97.4 72 111/69
 
 
MSE:
Jolie reported that she was in a "good mood" and she did look to be in good 
spirits this morning. She was (and has been) in good behavioral control. She 
denies wishing death or thinking of suicide. She reported that she is toelrating
the medications well, denied side effects but showed mild Parkinsonian shakes 
and mild Orofacial Tardive Dyskinesia (The TD most likely from decades of 
neuroleptics), and has gained weight with Zyprexa).
She says sleep and appetite are "good". She is awaiting discharge to [facility] on .
Jolie was calm and cooperative. She denied violent thoughts.
She reports that audio hallucinations are manageable, no overt delusions, 
organized and linear mostly. 
 
Assessment Update:
Mesha Neri (: ) is a 63-year-old  white female with 
schizoaffective Disorder, 
Doing well despite some AH and grandiose delusions
 
Treatment Plan Update:
Discharge to [facility] on 2018 
Continue same tx plan.

## 2018-08-16 VITALS — SYSTOLIC BLOOD PRESSURE: 136 MMHG | DIASTOLIC BLOOD PRESSURE: 76 MMHG

## 2018-08-16 VITALS — SYSTOLIC BLOOD PRESSURE: 136 MMHG | DIASTOLIC BLOOD PRESSURE: 68 MMHG

## 2018-08-16 LAB
ABSOLUTE GRANULOCYTE CT: 4.8 /CUMM (ref 1.4–6.5)
BASOPHILS # BLD: 0 /CUMM (ref 0–0.2)
BASOPHILS NFR BLD: 0.4 % (ref 0–2)
EOSINOPHIL # BLD: 0 /CUMM (ref 0–0.7)
EOSINOPHIL NFR BLD: 0.7 % (ref 0–5)
ERYTHROCYTE [DISTWIDTH] IN BLOOD BY AUTOMATED COUNT: 14 % (ref 11.5–14.5)
GRANULOCYTES NFR BLD: 73.4 % (ref 42.2–75.2)
HCT VFR BLD CALC: 37.2 % (ref 37–47)
LYMPHOCYTES # BLD: 1.3 /CUMM (ref 1.2–3.4)
MCH RBC QN AUTO: 29.6 PG (ref 27–31)
MCHC RBC AUTO-ENTMCNC: 33.5 G/DL (ref 33–37)
MCV RBC AUTO: 88.4 FL (ref 81–99)
MONOCYTES # BLD: 0.4 /CUMM (ref 0.1–0.6)
PLATELET # BLD: 165 /CUMM (ref 130–400)
PMV BLD AUTO: 8.6 FL (ref 7.4–10.4)
RED BLOOD CELL CT: 4.2 /CUMM (ref 4.2–5.4)
WBC # BLD AUTO: 6.5 /CUMM (ref 4.8–10.8)

## 2018-08-16 NOTE — SOCIAL WORKER PROG NOTE PSYCH
Social Work Progress Note
Progress Note
Received a voicemail from Renata Hogan ([facility]) stating that [facility] was 
misinformed and they do not have a bed tomorrow for Mesha Neri and they would
most likely have one next week.  Called  Renata back to clarify this as it was 
not fair to the patient who believes is leaving tomorrow.  Renata indicated that 
there was an emergency and someone else got the bed.  She stated Mesha should 
have a bed next week.
Conservatorship hearing held for Mesha.  Mesha's sister Meme was present.  She 
did not produce a signed document indicating she was Mesha's health care 
representative.  She continued to advocate to be her conservator.  Mesha's 
 shared that Mesha's opinion remained the same and that she did not want
her Sister to be her conservator and would like to proceed with Viviana Hernandez.  
Dr. Gharaibeh also testified as to why a neutral party and not family would be 
more appropriate.  The  ruled that there was clear and convincing evidence 
that Mesha needed a conservator of person and estate.
Viviana Hernandez was appointed.  Mesha was happy with the outcome.  Met with Julissa 
and Mesha after the hearing.  Shared that Mesha is not going to go to [facility] 
tomorrow and it may be pushed now to next week.  Mesha was fine with that 
decision.  She is happy to remain at [place].
Viviana shared her contact number:  [phone].  She is okay if Mesha needs it 
to call her.  She may see Mesha again next week.  Faxed the conservator decree 
to [facility].

## 2018-08-16 NOTE — CP SOUTH PROGRESS NOTE PSYCH
Psych (Inpt) Progress Note
Progress Note
 Laboratory Tests
 
 
 
 
  Sodium (137 - 145 mmol/L) 143
 
  Potassium (3.5 - 5.1 mmol/L) 4.1
 
  Chloride (98 - 107 mmol/L) 106
 
  Carbon Dioxide (22 - 30 mmol/L) 26
 
  Anion Gap (5 - 16) 11
 
  BUN (7 - 17 mg/dL) 18  H
 
  Creatinine (0.5 - 1.0 mg/dL) 0.9
 
  Estimated GFR (>60 ml/min) > 60
 
  BUN/Creatinine Ratio (7 - 25 %) 20.0
 
  Hemoglobin A1c (4.2 - 5.8 %) 6.6  H
 
  Total Bilirubin (0.2 - 1.3 mg/dL) 0.5
 
  Direct Bilirubin (< 0.4 mg/dL) 0.1
 
  AST (14 - 36 U/L) 28
 
  ALT (9 - 52 U/L) 33
 
  Alkaline Phosphatase (<127 U/L) 102
 
  Total Protein (6.3 - 8.2 g/dL) 8.2
 
  Albumin (3.5 - 5.0 g/dL) 4.6
 
Hematology 
 
  CBC w Diff NO MAN DIFF REQ
 
  WBC (4.8 - 10.8 /CUMM) 6.5
 
  RBC (4.20 - 5.40 /CUMM) 4.20
 
  Hgb (12.0 - 16.0 G/DL) 12.4
 
  Hct (37 - 47 %) 37.2
 
  MCV (81.0 - 99.0 FL) 88.4
 
  MCH (27.0 - 31.0 PG) 29.6
 
  MCHC (33.0 - 37.0 G/DL) 33.5
 
  RDW (11.5 - 14.5 %) 14.0
 
  Plt Count (130 - 400 /CUMM) 165
 
  MPV (7.4 - 10.4 FL) 8.6
 
  Gran % (42.2 - 75.2 %) 73.4
 
  Lymphocytes % (20.5 - 51.1 %) 20.0  L
 
  Monocytes % (1.7 - 9.3 %) 5.5
 
  Eosinophils % (0 - 5 %) 0.7
 
  Basophils % (0.0 - 2.0 %) 0.4
 
  Absolute Granulocytes (1.4 - 6.5 /CUMM) 4.8
 
  Absolute Lymphocytes (1.2 - 3.4 /CUMM) 1.3
 
  Absolute Monocytes (0.10 - 0.60 /CUMM) 0.4
 
  Absolute Eosinophils (0.0 - 0.7 /CUMM) 0
 
  Absolute Basophils (0.0 - 0.2 /CUMM) 0
 
 
 
Vital Signs
 
 
Date Time Temp Pulse B/P B/P O2
 
 0754 96.6 92 136/76  
 
08/15 1958 98.2 81 148/58  
 
 
The pt.'s progress, inpatient treatment plan, and aftercare plans were discussed
in the treatment planning meeting (team members: SCOUT, RN; OTR/L, and 
Psychiatrist)
 
MSE:
Jolie reported that her mood remains "very good.' Her affect was bright. She has 
been--and remains--in good behavioral control. She denies wishing death or 
thinking of suicide. She reported that she is toelrating the medications well, 
denied side effects 
Jolie showed mild Parkinsonian shakes and mild orofacial tardive dyskinesia (TD 
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 
Jolie has gained weight with Zyprexa.
She says sleep and appetite are "good". She is awaiting discharge to [facility] on .
Jolie was calm and cooperative. She denied violent thoughts. She reports that 
audio hallucinations are manageable, no overt delusions, organized and linear 
mostly. 
 
Assessment Update:
Mesha Neri (: 1955) is a 63-year-old  white female with 
schizoaffective Disorder, 
Doing well despite some AH and grandiose delusions
 
Treatment Plan Update:
Continue same treatment plan.

## 2018-08-17 VITALS — SYSTOLIC BLOOD PRESSURE: 114 MMHG | DIASTOLIC BLOOD PRESSURE: 67 MMHG

## 2018-08-17 VITALS — SYSTOLIC BLOOD PRESSURE: 137 MMHG | DIASTOLIC BLOOD PRESSURE: 70 MMHG

## 2018-08-17 NOTE — CP SOUTH PROGRESS NOTE PSYCH
Psych (Inpt) Progress Note
Progress Note
Progress Note
 Laboratory Tests
 
  Sodium (137 - 145 mmol/L) 143
  Potassium (3.5 - 5.1 mmol/L) 4.1
  Chloride (98 - 107 mmol/L) 106
  Carbon Dioxide (22 - 30 mmol/L) 26
  Anion Gap (5 - 16) 11
  BUN (7 - 17 mg/dL) 18  H
  Creatinine (0.5 - 1.0 mg/dL) 0.9
  Estimated GFR (>60 ml/min) > 60
  BUN/Creatinine Ratio (7 - 25 %) 20.0
  Hemoglobin A1c (4.2 - 5.8 %) 6.6  H
  Total Bilirubin (0.2 - 1.3 mg/dL) 0.5
  Direct Bilirubin (< 0.4 mg/dL) 0.1
  AST (14 - 36 U/L) 28
  ALT (9 - 52 U/L) 33
  Alkaline Phosphatase (<127 U/L) 102
  Total Protein (6.3 - 8.2 g/dL) 8.2
  Albumin (3.5 - 5.0 g/dL) 4.6
Hematology 
  CBC w Diff NO MAN DIFF REQ
  WBC (4.8 - 10.8 /CUMM) 6.5
  RBC (4.20 - 5.40 /CUMM) 4.20
  Hgb (12.0 - 16.0 G/DL) 12.4
  Hct (37 - 47 %) 37.2
  MCV (81.0 - 99.0 FL) 88.4
  MCH (27.0 - 31.0 PG) 29.6
  MCHC (33.0 - 37.0 G/DL) 33.5
  RDW (11.5 - 14.5 %) 14.0
  Plt Count (130 - 400 /CUMM) 165
  MPV (7.4 - 10.4 FL) 8.6
  Gran % (42.2 - 75.2 %) 73.4
  Lymphocytes % (20.5 - 51.1 %) 20.0  L
  Monocytes % (1.7 - 9.3 %) 5.5
  Eosinophils % (0 - 5 %) 0.7
  Basophils % (0.0 - 2.0 %) 0.4
  Absolute Granulocytes (1.4 - 6.5 /CUMM) 4.8
  Absolute Lymphocytes (1.2 - 3.4 /CUMM) 1.3
  Absolute Monocytes (0.10 - 0.60 /CUMM) 0.4
 
The pt.'s progress, inpatient treatment plan, and aftercare plans were discussed
in the treatment planning meeting (team members: SCOUT, RN; OTR/L, and 
Psychiatrist)
 
MSE:
Jolie reported that she's happy about staying at [place] (her bed at [facility] was 
given to someone else)
She reported that her mood remains "very good." 
Jolie has been--and remains--in good behavioral control. She denies wishing death 
or thinking of suicide. She reported that she is toelrating the medications well
, denied side effects 
Jolie showed mild Parkinsonian tremors and mild orofacial tardive dyskinesia (TD 
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 
Zyprexa will be discontinued effective immediately, the reason being that Jolie 
has been very bothered by weight gain (she is also type II diabetic)
Jolie was calm and cooperative. She denied violent thoughts. She reports that 
audio hallucinations are manageable, no overt delusions, organized and linear 
mostly. 
 
Assessment Update:
Mesha Neri (: 1955) is a 63-year-old  white female who has 
been on the Inpatient Psychiatric Unit of [facility] since 2018
Doing well despite some AH and grandiose delusions
 
Diagnoses:
schizoaffective Disorder
Type II DM
Hypothyroidism
 
Treatment Plan Update:
1) D/C Zyprexa.
2) Change Geodon to 40 mg with breakfast and 80 mg with supper
Continue all other medications unchanged

## 2018-08-18 VITALS — SYSTOLIC BLOOD PRESSURE: 145 MMHG | DIASTOLIC BLOOD PRESSURE: 81 MMHG

## 2018-08-18 VITALS — DIASTOLIC BLOOD PRESSURE: 81 MMHG | SYSTOLIC BLOOD PRESSURE: 131 MMHG

## 2018-08-18 NOTE — CP SOUTH PROGRESS NOTE PSYCH
Psych (Inpt) Progress Note
Progress Note
Include the following elements, when applicable:
Involvement in the active treatment of the patient with behavioral observations 
of the patient and the patient's response to the treatment.
Review of the ongoing treatment process in the context of the treatment plan.
Indication of how multi-disciplinary staff members are carrying out the 
treatment plan.
Plans for future interventions and recommendations for revision of the treatment
plan.
Liaison with other physicians/providers.
Progress Note:
 
Doing well, has been writign short letters with inspirational statements to 
multiple staff members. Said she has written 72 in total. They appear to be 
encouraging, with quotes from the morning groups. She stated that she hopes to 
be discharged and live with Pb and continue to take her meds. Poor insight 
overall regarding her global situation and recommendations for further treatment
, but overall doing well compared to her baseline - no hypersexual, 
inappropriate comments, not angry and not actively hallucinating. 
 
MSE: pleasant middle aged woman, evidence of TD noted in mouth. Speech is 
normal. Affect is full and reactive, her mood is good. No psychomotor agitation 
or slowing. Her thinking is coherent overall. She is somewhat hyper excited and 
euphoric but no other delusional statements during superficial discussions. Her 
insight is poor and judgment is adequate. 
 
A: 63 year old woman with long standing psychotic illness, appears to be doing 
well compared to her baseline. 
Continue current plan of care.

## 2018-08-19 VITALS — DIASTOLIC BLOOD PRESSURE: 76 MMHG | SYSTOLIC BLOOD PRESSURE: 125 MMHG

## 2018-08-19 VITALS — SYSTOLIC BLOOD PRESSURE: 141 MMHG | DIASTOLIC BLOOD PRESSURE: 72 MMHG

## 2018-08-19 NOTE — CP SOUTH PROGRESS NOTE PSYCH
Psych (Inpt) Progress Note
Progress Note
Include the following elements, when applicable:
Involvement in the active treatment of the patient with behavioral observations 
of the patient and the patient's response to the treatment.
Review of the ongoing treatment process in the context of the treatment plan.
Indication of how multi-disciplinary staff members are carrying out the 
treatment plan.
Plans for future interventions and recommendations for revision of the treatment
plan.
Liaison with other physicians/providers.
Progress Note:
 
Overnight was awake, pacing, hypersexual, hyperverbal, unable to sleep. Received
1mg clonazepam with good effect around 2am, slept and was awake early this 
morning as well. Said that she was "too excited" maybe about all the letters she
was writing? She has been eating well. She continues to be somewhat excitable/
euphoric, but no internal preoccupation or other gross delusional statements. 
 
MSE: pleasant middle aged woman, her grooming and hygiene are good. Her speech 
is regular rate and rhythm. Her affect is full, reactive overall, and her mood 
is happy. She has no tremor noted. She has no psychomotor agitation this 
morning. She is logical and goal directed in her thinking. Does not bring up 
delusional topics. She is not internally preoccupied and has no thoughts to harm
herself or anyone else. Her insight is poor and judgment is adequate. 
 
A: 63 year old woman with long standing psychotic illness, appears to be doing 
well compared to her baseline. Slept poorly overnight with hypersexual 
statements and required oral PRN medication. 
 
Plan: continue current plan of care
- added back clonazepam 1mg at night timr PRN insomnia - she said she didn't use
it much when she had it, hopefully last night was just one episode and sleep 
will resume as normal.

## 2018-08-20 VITALS — SYSTOLIC BLOOD PRESSURE: 145 MMHG | DIASTOLIC BLOOD PRESSURE: 79 MMHG

## 2018-08-20 VITALS — SYSTOLIC BLOOD PRESSURE: 144 MMHG | DIASTOLIC BLOOD PRESSURE: 75 MMHG

## 2018-08-20 NOTE — SOCIAL WORKER PROG NOTE PSYCH
**See Addendum**
Social Work Progress Note
Progress Note
Pt presents as pleasant, anxious, slightly manic. Pt reports doing well - "the 
best I've ever been". Pt aware that she is waiting for a bed opening at [facility] 
but that she won't be discharged today. Pt reports being "thankful" she isn't 
leaving today as she would like to have more notice so she can be better 
prepared for discharge. Attempted to reach Natalie Gonzalez at [facility] [phone] 
but she is out today. Spoke to Nursing Supervisior Louie who reports there are no
open beds today but wasn't sure when there would be an opening for Mesha. he 
suggested calling back tomorrow to speak directly with Mark.

## 2018-08-20 NOTE — CP SOUTH PROGRESS NOTE PSYCH
Psych (Inpt) Progress Note
Progress Note
I reviewed Dr. Ravi's notes for the weekend of  and 
 
The pt.'s progress, inpatient treatment plan, and aftercare plans were discussed
in the treatment planning meeting (team members: SCOUT, RN; OTR/L, and 
Psychiatrist)
 
Vital Signs
 
 
Date Time Temp Pulse B/P B/P Pulse O2 FiO2
 
0738 98.2 97 144/75    
 
1959 98.7 85 125/76    
 
 
 
MSE:
Jolie gave me and other staff "Thank You Letters"
She is awaiting a bed at [facility]. She reported that her mood remains "very good." 
Jolie has been/remains in good behavioral control. She denies wishing death or 
thinking of suicide. She reported that she is toelrating the medications well, 
denied side effects. Jolie showed mild Parkinsonian tremors and mild orofacial 
tardive dyskinesia (TD most likely from decades of neuroleptics). Jolie was calm 
and cooperative. She denied violent thoughts. She reports that audio 
hallucinations are manageable, no overt delusions, organized and linear mostly. 
 
Assessment Update:
Mesha Neri (: 1955) is a 63-year-old  white female who has 
been on the Inpatient Psychiatric Unit of [facility] since 2018
Doing well despite some AH and grandiose delusions
 
Diagnoses:
schizoaffective Disorder
Type II DM
Hypothyroidism
 
Treatment Plan Update:
1) Jolie refused to consider an increase in Geodon, therefore, will continue dose 
at 40 mg with breakfast and 80 mg with supper
Continue all other medications unchanged

## 2018-08-21 VITALS — SYSTOLIC BLOOD PRESSURE: 149 MMHG | DIASTOLIC BLOOD PRESSURE: 74 MMHG

## 2018-08-21 VITALS — DIASTOLIC BLOOD PRESSURE: 85 MMHG | SYSTOLIC BLOOD PRESSURE: 133 MMHG

## 2018-08-21 NOTE — CP SOUTH PROGRESS NOTE PSYCH
Psych (Inpt) Progress Note
Progress Note
The pt.'s progress, inpatient treatment plan, and aftercare plans were discussed
in the treatment planning meeting (team members: SCOUT, RN; Activities therapist,
and Psychiatrist)
 
Mental status:
Pt. was calm and cooperative. She denied violent thoughts. 
The pt. is awaiting a bed at [facility]. She reported that her mood remains "very 
good." 
She remains in good behavioral control. She denies wishing death or thinking of 
suicide. She reported that she is tolerating the medications well, denied side 
effects. Jolie showed mild Parkinsonian tremors and mild orofacial tardive 
dyskinesia (TD most likely from decades of neuroleptics). She reports that audio
hallucinations are manageable, no overt delusions, 
Coherent, organized and linear. 
 
Treatment Plan Update:
1) Continue ziprasidone (Geodon) 40 mg with breakfast and 80 mg with supper
Continue all other medications unchanged

## 2018-08-22 VITALS — SYSTOLIC BLOOD PRESSURE: 115 MMHG | DIASTOLIC BLOOD PRESSURE: 63 MMHG

## 2018-08-22 NOTE — PATIENT DISCHARGE INSTRUCTIONS
Psych Discharge Inst
 
General Discharge Information
Reason for Admission:
delusions and hallucinations
Psy Discharge Primary Diag+ schizoaffective Disorder
Summary Tests/Major Procedures
 Lab
 
 
Cholesterol 143 MG/DL 08/10/18 0620
 
Cholesterol/HDL Ratio 3 % 07/01/18 0620
 
Free T4 1.79 ng/dL 08/17/18 0639
 
HDL Cholesterol 61 mg/dL H 07/01/18 0620
 
Hemoglobin A1c 6.6 % H 08/16/18 1000
 
LDL Cholesterol, Calc 119 mg/dL 07/01/18 0620
 
Triglycerides 149 mg/dL 07/01/18 0620
 
 
Studies Pending at DC:
none
 
Patient Instructions
Contact Information
Your Psychiatrist on [facility] was Gharaibeh MD,Camacho
 
* If you are experiencing an emergency related to this hospitalization, please 
call [phone] to contact the treating psychiatrist or the psychiatrist-on-
call.
 
* To Request a copy of your medical records, please contact the Medical Records 
Department at [phone].
 
* To request results of studies pending at the time of discharge, please call 
[phone].
 
* Continue your Medications until directed to stop by your Healthcare provider.
 
General Medication Information
Please continue to take your new medications and your continued home medications
, unless otherwise indicated on your discharge medication list, or unless 
directed by your MD or APRN to stop them.
 
 
Special Instructions
Diet Diabetic
Activity Normal
- Tobacco Use Treatment Offered
Post DC Medications Offered: Not Applicable
Post DC Tobacco Treatment Plan: Not Applicable
- EtOH/Drug Use D/O Treatment Offered
Post DC Medications Offered: NA-No EtOH/Drug Use D/O
Post DC EtOH/SubAbuse TX Plan: NA-No EtOH/Drug Use D/O
Metabolic Screening
Patient on a neuroleptic(s) .
     Enter below results for Hemoglobin A1C, 
     and lipid panel if obtained during the last 365 days.
 
BMI: 25.700     
 
Blood Pressure: 115/63
 
Laboratory Results From [facility] E[facility] (If applicable):
 
 
 
Advance Directives
Does the Patient have Medical Advance Directives No/Refused further info
Does Pt have Psychiatric Advance Directives? No/Refused further info
Does Patient have a Designated Surrogate Decision Maker: No
Information About Psychiatric Advance Directives Provided? Refused
 
Discharge Plan
[facility]

## 2018-08-22 NOTE — CP SOUTH PROGRESS NOTE PSYCH
Psych (Inpt) Progress Note
Progress Note
The pt.'s progress, inpatient treatment plan, and aftercare plans were discussed
in the treatment planning meeting (team members: SCOUT, RN; Activities therapist,
and Psychiatrist)
 
Vital Signs
 
 
Date Time Temp Pulse B/P O2
 
08/22 0739 97.8 98 115/63 
 
08/21 1940 97.3 98 149/74 
 
Mental status:
Pt. was calm and cooperative. She denied violent thoughts. The pt. is awaiting a
bed at [facility]. She reported that her mood remains "very good." She remains in 
good behavioral control. She denies wishing death or thinking of suicide. She 
reported that she is tolerating the medications well, denied side effects. Jolie 
showed mild Parkinsonian tremors and mild orofacial tardive dyskinesia (TD most 
likely from decades of neuroleptics). She reports that audio hallucinations are 
manageable, no overt delusions. Coherent, organized and linear. 
 
Treatment Plan Update:
D/C to [facility]

## 2018-08-22 NOTE — SOCIAL WORKER PROG NOTE PSYCH
Social Work Progress Note
Progress Note
Pt was discharged to [facility] today, she was in good spirits and left the unit in a
positive manner via [facility].
Faxed Referral(s)
   Referred To: [facility]
   Transition of Care Documents sent: BESSIE Instructions, Health Summary, W10
   Faxed to: [facility]
   Fax #: [phone]
   Faxed by: Hilary ARCEO
   Date faxed: 08/22/18
   Time Faxed: [phone]

## 2018-08-22 NOTE — DISCHARGE SUMMARY REPORT-PSYCH
Visit Information
 
Visit Dates/Diagnosis'
Admission Date:
06/22/18
 
Discharge Date: 08/22/18
Reason for Admission:
delusions and hallucinations
Psy Discharge Primary Diag: schizoaffective Disorder
 
Hospital Course
Significant Lab Findings:
 Lab
 
 
Cholesterol 143 MG/DL 08/10/18 0620
 
Cholesterol/HDL Ratio 3 % 07/01/18 0620
 
HDL Cholesterol 61 mg/dL H 07/01/18 0620
 
Hemoglobin A1c 6.6 % H 08/16/18 1000
 
LDL Cholesterol, Calc 119 mg/dL 07/01/18 0620
 
Triglycerides 149 mg/dL 07/01/18 0620
 
 
 
Course
Complications:
The patient did not have any complications while she was on the inpatient 
psychiatric unit.
Consultations:
The patient had a history and physical examination by the internist.  Please 
refer to the patient's electronic health record for the details of the H&P.
Allergies:
Coded Allergies:
No Known Allergies (05/24/18)
 
Hospital Course/TX Response:
6/23/2018:
Initial impression and Plan:
63-year-old  white female with history of schizoaffective disorder 
returns approximately 3 weeks after her discharge from the inpatient unit 
because of what seems to be more delusional than usual and some thought disorder
Diagnosis(es):
Schizoaffective disorder, bipolar type
Tardive dyskinesia of the oral facial muscles
Parkinson's disease
Initial Treatment Plan:
Inpatient psychiatric care
Resume Zyprexa
Continue other medications unchanged
Nursing assessments, vital signs, and patient education
Biopsychosocial assessment, collateral information, and aftercare planning by 
social work
Group therapy, milieu therapy, and activities therapy.
 
The patient ended up staying at the inpatient psychiatric unit at [facility] for an extended period of time (2 months)
The patient did very well on Zyprexa however she could not do need to complain 
about the weight gain that she had with Zyprexa, she she also has diabetes 
mellitus type 2.  Zyprexa was chosen and had previous admission to [facility] because of the significant Parkinsonian tremors and tardive dyskinesia 
she was having in the previous admission.
 
Given the patient's legitimate concerns about the Zyprexa side effects, I 
started a gradual corss titration with the Zyprexa gradually being reduced and 
the Geodon dose was gradually increased until the transition was completed.
 
Although the Zyprexa seems to have been a little bit more effective, the patient
was able to tolerate Geodon up to the dose at the time of her discharge which 
was 120 mg per day as 40 mg in the morning and 80 mg with supper
 
The patient did have a mild increase in her parkinsonian shakes and continued to
have tardive dyskinesia which is related to many years if not decades of use of 
neuroleptics in the past including high doses of Haldol.  The patient's tardive 
dyskinesia was localized to the oral facial muscles
 
8/22/2018:
 
 
Date Time Temp Pulse B/P O2
 
08/22 0739 97.8 98 115/63 
 
08/21 1940 97.3 98 149/74 
 
Mental status:
Pt. was calm and cooperative. She denied violent thoughts. The pt. is awaiting a
bed at [facility]. She reported that her mood remains "very good." She remains in 
good behavioral control. She denies wishing death or thinking of suicide. She 
reported that she is tolerating the medications well, denied side effects. Jolie 
showed mild Parkinsonian tremors and mild orofacial tardive dyskinesia (TD most 
likely from decades of neuroleptics). She reports that audio hallucinations are 
manageable, no overt delusions. Coherent, organized and linear. 
Treatment Plan Update:
D/C to [facility]
 
 
 
Discharge HBIPS
- Tobacco Use Treatment Offered
Post DC Medications Offered: Not Applicable
Post DC Tobacco Treatment Plan: Not Applicable
- EtOH/Drug Use D/O Treatment Offered
Post DC Medications Offered: NA-No EtOH/Drug Use D/O
Post DC EtOH/SubAbuse TX Plan: NA-No EtOH/Drug Use D/O
 
Metabolic Screening
- Screen if on a Neuroleptic Medication
- Metabolic screening should include:
- Blood Pressure, BMI, Glucose or Hgb A1c, & a
- Lipid profile from within the past 365 days.
Metabolic Screening
Patient on a neuroleptic(s) .
     Enter below results for Hemoglobin A1C, 
     and lipid panel if obtained during the last 365 days.
 
BMI: 25.700     
Blood Pressure: 115/63
Laboratory Results From [facility] (If applicable):
 
 Lab
 
 
Cholesterol 143 MG/DL 08/10/18 0620
 
Cholesterol/HDL Ratio 3 % 07/01/18 0620
 
HDL Cholesterol 61 mg/dL H 07/01/18 0620
 
Hemoglobin A1c 6.6 % H 08/16/18 1000
 
LDL Cholesterol, Calc 119 mg/dL 07/01/18 0620
 
Triglycerides 149 mg/dL 07/01/18 0620
 
 
 
Discharge Instructions
 
General Discharge Information
Multiple Neuroleptics:
Not Applicable
  
Discharge Diet Diabetic
Discharge Activity Normal
[facility]
Referrals
Ordered Referrals
Provider Referral 08/17/18
For Groups:
[[facility]]
 
Admission to [facility]: 8/17/18   
  
Address: [address] [place] [id number]  
[phone]
 
 
 
Prescriptions
Stop taking the following medications:
Clonazepam (Klonopin) 1 MG TABLET ORAL AT BEDTIME Qty = 30
 
Olanzapine (Zyprexa Zydis) 20 MG TAB.RAPDIS ORAL Every night Qty = 30
 
Metformin Hydochloride (Glucophage) 500 MG TABLET ORAL 0800,1700 Qty = 60
 
Levothyroxine Sodium (Synthroid) 137 MCG TABLET ORAL DAILY Qty = 60
 
Continue taking these medications:
Atorvastatin Calcium (Lipitor) 40 MG TABLET
    1 Tablet ORAL Every night
    Qty = 30
    Comments:
       Last Taken:8/21/18
             Time:5pm
 
Aspirin (Ecotrin*) 81 MG TABLET.DR
    1 Tablet ORAL DAILY
    Qty = 30
    Comments:
       Last Taken:8/22/18
             Time:8am
 
Start taking the following new medications:
Ziprasidone HCl (Ziprasidone HCl) 80 MG CAPSULE
    80 Milligram ORAL 1800
    Qty = 1
    No Refills
    Comments:
       Last Taken:8/21/18
             Time:5pm
 
Ziprasidone HCl (Ziprasidone HCl) 40 MG CAPSULE
    40 Milligram ORAL DAILY @8 AM
    Qty = 1
    No Refills
    Comments:
       Last Taken:8/22/18
             Time:8am
 
Calcium Carbonate/Vitamin D3 (Oyster Shell 250 MG + Vit D Tb) 250 MG CALCIUM (
625 MG)-125 UNIT TABLET
    250 Milligram ORAL DAILY
    Qty = 1
    No Refills
    Comments:
       Last Taken:8/22/18
             Time:8am
 
Metformin Hydochloride (Glucophage) 500 MG TABLET
    500 Milligram ORAL 5 PM
    Qty = 1
    No Refills
    Comments:
       Last Taken:8/21/18
             Time:5pm
 
Levothyroxine Sodium (Synthroid) 125 MCG TABLET
    0.125 Milligram ORAL DAILY BEFORE BREAKFAST
    Qty = 1
    No Refills
    Comments:
       Last Taken:8/22/18
             Time:7am
 
Cyanocobalamin (Vitamin B-12) (Vitamin B-12) 250 MCG TABLET
    250 Microgram ORAL DAILY
    Qty = 15
    No Refills
    Comments:
       Last Taken:8/22/18
             Time:8am
 
Multivitamin (One Daily Multivitamin) 1 EACH TABLET
    1 Tablet ORAL DAILY
    Qty = 1
    No Refills
    Comments:
       Last Taken:8/22/18
             Time:8am
 
 
Studies Pending at Discharge
none
Copies To:
[facility]

## 2018-11-06 NOTE — CP SOUTH PROGRESS NOTE PSYCH
After Visit Summary   2018    Teresita Hutton    MRN: [id number]           Patient Information     Date Of Birth          1989        Visit Information        Provider Department      2018 8:15 AM Hamida Cabezas,  [facility] [place]        Today's Diagnoses     Normal pregnancy in first trimester    -  1    Hashimoto's thyroiditis        H/O:           Care Instructions    Prenatal Care  What is prenatal care?   Prenatal care is the care you receive when you are pregnant. It includes care given by your healthcare provider, support from your family, and an extra focus on giving yourself the care you need during this special time. Prenatal care improves your chances for a healthy pregnancy and healthy baby.   When should I see my healthcare provider?   Good care during pregnancy includes regularly scheduled prenatal exams.   If you are not yet pregnant but planning to get pregnant in the next few months, see your provider. Your provider may do some tests and talk about things you can do to have a healthy pregnancy and healthy baby.   You should schedule your first prenatal visit with your provider as soon as you think or know you are pregnant. Depending on your health and health history, your provider will probably schedule visits at least once a month for the first 6 months. During the 7th and 8th months you will see your provider every 2 weeks. During the last month you will probably see your provider once a week until you deliver your baby. If your pregnancy is high risk, your provider will probably want to see you more often. In some cases your provider may refer you to a medical specialist for more help with special needs, such as diabetes.   At each visit your healthcare provider will check to make sure that you and the baby are healthy. Regular visits can help you and your provider prevent possible problems. They can also help your provider find and  Psych (Inpt) Progress Note
Progress Note
Pt notes that she is "good" today. Feels slept well. Spoke about love for Jarocho.
Denies SI or HI. 
 
 Current Medications
 
 
  Sig/Sharan Start time  Last
 
Medication Dose Route Stop Time Status Admin
 
Acetaminophen 650 MG Q4P PRN 06/22 1500 AC 07/22
 
  PO   0053
 
Al Hydroxide/Mg  30 ML .STK-MED ONE 07/21 2155 DC 
 
Hydroxide  PO 07/21 2156  
 
Al Hydroxide/Mg  30 ML Q4-6 PRN PRN 06/22 1500 AC 07/21
 
Hydroxide  PO   2156
 
Aspirin Buffered 81 MG DAILY 06/23 0900 AC 07/22
 
  PO   0841
 
Atorvastatin Calcium 40 MG 1700 06/22 1700 AC 07/21
 
  PO   1748
 
Calcium/Vitamin D 250 MG DAILY 06/23 0934 AC 07/22
 
  PO   0842
 
Clonazepam 1 MG AT BEDTIME 07/11 2100 AC 07/21
 
  PO 07/25 2058  2156
 
Cyanocobalamin 250 MCG DAILY 07/19 1415 AC 07/22
 
  PO   0841
 
Levothyroxine Sodium 0.125 MG DAILY AC 06/23 0932 AC 07/22
 
  PO   0630
 
Magnesium Hydroxide 30 ML AT BEDTIME PRN 07/05 0800 AC 07/17
 
  PO   2206
 
Metformin HCl 500 MG 0800,1700 06/22 1700 AC 07/22
 
  PO   0841
 
Multivitamins 1 TAB DAILY 06/23 0933 AC 07/22
 
  PO   0841
 
Olanzapine 10 MG AT BEDTIME 07/19 2100 AC 07/21
 
  PO   2156
 
Olanzapine 10 MG 0800 07/12 0800 AC 07/22
 
  PO   0841
 
Ziprasidone 40 MG 0900 07/20 0900 AC 07/22
 
  PO   0841
 
 
Vital Signs
 
 
Date Time Temp Pulse Resp B/P B/P Pulse O2 O2 Flow FiO2
 
     Mean Ox Delivery Rate 
 
07/22 0731 98.0 104  128/74     
 
07/21 2001 97.1 96  122/72     
 
07/21 1548  96  141/74     
 
 
 
MSE
Appearance: as stated age
Speech :  nl rate, rhythm, volume and prosody  
Behavior: cooperative
Motor:  no psychomotor agitation or retardation
Mood : good, everything is good
Affect : flat nut slightly more reactive, non-labile, appropriate, constricted
Thought process:  linear and goal directed
Thought content : ++delusions ?exhusband
Perceptions: denied AVHs, denied SI or HI
Insight: poor
Judgment:  poor
 
A/P: Pt with hx of schizoaffective disorder with continued psychotic sx though 
improved slightly. 
 
Continue current medication regimen
Encourage integration into the milieu treat any problems early. In addition to meeting your medical needs, your provider advise you about caring for yourself. You will talk about how to have a healthy diet and get plenty of exercise and rest. Your provider can also help you deal with the emotional changes that can happen during pregnancy.   What will happen at the first prenatal visit?   At your first visit, your provider will ask about your personal medical history. He or she will also ask about the baby's father and your family health history. This information can help give your provider an idea of any problems you might have during your pregnancy. You will have a physical exam, including checks of your height, weight, and blood pressure and a pelvic exam. You will have a Pap test, urine tests, blood tests, and cultures of the cervix and vagina to check for infection.   Your provider will calculate your due date and the age of your baby. If your periods were regular before you got pregnant, and you are sure of the day when your last period started, your due date will be estimated to be 40 weeks from that day.   Your provider will talk to you about how to stay healthy during your pregnancy.   What will happen at other prenatal visits?   Your provider will check how you are doing and how the baby is developing. He or she will discuss how you are feeling, ask if you have any problems, and answer your questions.   During each prenatal visit your provider will:   weigh you   take your blood pressure   check your urine for sugar, protein, or bacteria   check your face, hands, ankles, and feet for swelling   listen to the baby's heartbeat   measure the size of the uterus to check the baby's growth.   At different times during the pregnancy, other exams and tests may be done. Some are routine and others are done only when a problem is suspected or you have a risk factor for a problem. Examples of other tests you might have are:   tests to check for genetic  problems and some birth defects, such as:   chorionic villus sampling of cells from the placenta   amniocentesis to test the fluid around the baby   blood tests called triple or quad screens   ultrasound scans to check the baby's growth, development, and health and to look at your uterus, the amniotic sac, and the placenta   blood tests to check for diabetes   electronic monitoring to check the health of the baby.   How can I take care of myself during my pregnancy?   Here are some things you can do to take good care of yourself during your pregnancy and prepare for the birth of your child:   Keep all appointments with your healthcare provider. Use these visits to discuss your pregnancy concerns or problems. Write down questions before each visit so that you will not forget about things you want to talk about.   Eat healthy meals that include whole grains, fresh fruits and vegetables, and calcium-rich foods, such as milk, cheese, and yogurt. Choose foods low in saturated fat. Do not eat uncooked or undercooked meats or fish.   Avoid certain fish with high levels of mercury. These fish include shark, julia mackerel, swordfish, and tilefish. Do not eat more than 12 ounces of fish per week, or no more than 6 ounces of tuna fish per week. Because albacore tuna fish is also high in mercury, choose light tuna.   Drink plenty of water each day.   Take vitamins, other supplements, and medicines as recommended by your provider.   Unless your healthcare provider tells you not to, try to be physically active for at least 30 minutes a day, most days of the week. If you are pressed for time, you might find it easier to exercise 10 minutes at a time, 3 times a day. Consider taking a prenatal exercise class.   Do not smoke, do not drink alcohol, and do not take illegal drugs.   Talk to your healthcare provider before you take any medicine, including nonprescription and herbal medicines. Some medicines are not safe during pregnancy.    Avoid hot tubs or saunas.   If you have cats in your home, do not empty the cat litter while you are pregnant. It may contain a parasite that causes an infection called toxoplasmosis, which can cause birth defects. Also, use gloves when you work in garden areas used by cats.   Stay away from toxic chemicals like insecticides, solvents (such as some  or paint thinners), lead, and mercury. Check labels on household products. Most dangerous products have pregnancy warnings on their labels. Ask your healthcare provider about products if you are unsure.   Relax by taking breaks from work or chores.   Help reduce stress by sharing your feelings with others.   Report any violence or other types of abuse in your home.   Learn more about pregnancy, labor, and delivery. Read books, watch videos, go to a childbirth class, and talk with experienced moms.   Plan for the lifestyle changes a new baby will bring. Prepare for possible changes in your budget, work situation, daily schedule, and relationships with family and friends.   Talk to your provider about the pros and cons of breast-feeding.   Before and during your pregnancy, try to do everything you can to keep yourself and your baby healthy during your pregnancy.     Published by [employer].  This content is reviewed periodically and is subject to change as new health information becomes available. The information is intended to inform and educate and is not a replacement for medical evaluation, advice, diagnosis or treatment by a healthcare professional.   Developed by [employer].   ? 2010 [facility] and/or its affiliates. All Rights Reserved.   Copyright   [employer] 2011              Follow-ups after your visit        Follow-up notes from your care team     Return in about 4 weeks (around 12/4/2018).      Who to contact     If you have questions or need follow up information about today's clinic visit or your schedule please contact [facility] directly at [phone].  Normal or non-critical lab and imaging results will be communicated to you by MyChart, letter or phone within 4 business days after the clinic has received the results. If you do not hear from us within 7 days, please contact the clinic through [employer] or phone. If you have a critical or abnormal lab result, we will notify you by phone as soon as possible.  Submit refill requests through [employer] or call your pharmacy and they will forward the refill request to us. Please allow 3 business days for your refill to be completed.          Additional Information About Your Visit        [employer] Experience Information     [employer] gives you secure access to your electronic health record. If you see a primary care provider, you can also send messages to your care team and make appointments. If you have questions, please call your primary care clinic.  If you do not have a primary care provider, please call [phone] and they will assist you.        Care EveryWhere ID     This is your Care EveryWhere ID. This could be used by other organizations to access your [facility] medical records  [id number]        Your Vitals Were     Pulse Height Last Period Pulse Oximetry BMI (Body Mass Index)       77 5' 4.17\" (1.63 m) 08/20/2018 100% 29.23 kg/m2        Blood Pressure from Last 3 Encounters:   11/06/18 104/63   11/14/17 122/78   11/06/17 114/80    Weight from Last 3 Encounters:   11/06/18 171 lb 3.2 oz (77.7 kg)   09/26/18 167 lb 8 oz (76 kg)   11/14/17 212 lb (96.2 kg)              We Performed the Following     ABO/Rh type and screen     CBC with platelets differential     Chlamydia trachomatis PCR     Hepatitis B surface antigen     HIV Antigen Antibody Combo     Neisseria gonorrhoeae PCR     Rubella Antibody IgG Quantitative     Treponema Abs w Reflex to RPR and Titer     TSH     UA with Microscopic reflex to Culture        Primary Care Provider Office Phone # Fax #    Nikki Castro MD " [phone]       [address] [place] [address]        Equal Access to Services     BRYNN ROSS : Trinh Jurado, jameskenyetta reynoldssarahha, breannasandy sheffieldcorriekenyetta avitiacliftonkenyetta, waxjihan gerardoin hayaasoumya robertsonkassi peterric marvin grey. [facility] [phone].    ATENCIÓN: Si habla español, tiene a sterling disposición servicios gratuitos de asistencia lingüística. Llame al [phone].    We comply with applicable federal civil rights laws and [place] laws. We do not discriminate on the basis of race, color, national origin, age, disability, sex, sexual orientation, or gender identity.            Thank you!     [place] you [place] [facility] [place]. Our goal is always to provide you with excellent care. Hearing back from our patients is one way we can continue to improve our services. Please take a few minutes to complete the written survey that you may receive in the mail after your visit with us. Thank you!             Your Updated Medication List - Protect others around you: Learn how to safely use, store and throw away your medicines at www.disposemymeds.org.          This list is accurate as of 11/6/18  9:05 AM.  Always use your most recent med list.                   Brand Name Dispense Instructions for use Diagnosis    levothyroxine 100 MCG tablet    SYNTHROID/LEVOTHROID     TAKE 1 TABLET (100 MCG) BY MOUTH ONCE DAILY.        order for DME     1 each    Breast pump    Encounter for supervision of other normal pregnancy in second trimester, Rh negative status in sears pregnancy in second trimester       PRENATAL 1 PO      Take 1 tablet by mouth daily        vitamin D2 64830 UNIT capsule    ERGOCALCIFEROL     TAKE 1 CAPSULE (50,000 UNITS) BY MOUTH EVERY 7 (SEVEN) DAYS.
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
